# Patient Record
Sex: FEMALE | Race: WHITE | NOT HISPANIC OR LATINO | Employment: OTHER | URBAN - METROPOLITAN AREA
[De-identification: names, ages, dates, MRNs, and addresses within clinical notes are randomized per-mention and may not be internally consistent; named-entity substitution may affect disease eponyms.]

---

## 2017-03-01 ENCOUNTER — TRANSFERRED RECORDS (OUTPATIENT)
Dept: HEALTH INFORMATION MANAGEMENT | Facility: CLINIC | Age: 67
End: 2017-03-01

## 2017-04-25 ENCOUNTER — TRANSFERRED RECORDS (OUTPATIENT)
Dept: HEALTH INFORMATION MANAGEMENT | Facility: CLINIC | Age: 67
End: 2017-04-25

## 2017-05-11 ENCOUNTER — TRANSFERRED RECORDS (OUTPATIENT)
Dept: HEALTH INFORMATION MANAGEMENT | Facility: CLINIC | Age: 67
End: 2017-05-11

## 2017-05-17 ENCOUNTER — TRANSFERRED RECORDS (OUTPATIENT)
Dept: HEALTH INFORMATION MANAGEMENT | Facility: CLINIC | Age: 67
End: 2017-05-17

## 2017-06-12 ENCOUNTER — TRANSFERRED RECORDS (OUTPATIENT)
Dept: HEALTH INFORMATION MANAGEMENT | Facility: CLINIC | Age: 67
End: 2017-06-12

## 2017-11-20 ENCOUNTER — TRANSFERRED RECORDS (OUTPATIENT)
Dept: HEALTH INFORMATION MANAGEMENT | Facility: CLINIC | Age: 67
End: 2017-11-20

## 2017-12-07 DIAGNOSIS — J84.9 ILD (INTERSTITIAL LUNG DISEASE) (H): Primary | ICD-10-CM

## 2018-05-23 DIAGNOSIS — J84.9 ILD (INTERSTITIAL LUNG DISEASE) (H): Primary | ICD-10-CM

## 2018-05-31 ENCOUNTER — DOCUMENTATION ONLY (OUTPATIENT)
Dept: PULMONOLOGY | Facility: CLINIC | Age: 68
End: 2018-05-31

## 2018-05-31 DIAGNOSIS — J84.9 ILD (INTERSTITIAL LUNG DISEASE) (H): ICD-10-CM

## 2018-05-31 PROBLEM — M32.9 SYSTEMIC LUPUS ERYTHEMATOSUS (H): Status: ACTIVE | Noted: 2018-05-31

## 2018-05-31 LAB
6 MIN WALK (FT): 1480 FT
6 MIN WALK (M): 451 M

## 2018-05-31 NOTE — PROGRESS NOTES
PULMONARY STAFF    Pt referred by Dr. Ng and colleagues (with specific request for consultation by Dr. ANNALISE Valentin) for evaluation of potential needs for diagnostic lung Bx and/or treatment for abnormal chest CT (including lymphadenopathy and/or nodules plus mosaic pattern), mild restrictive PFT abnormalities and probable restrictive lung disease assoc with her longstanding SLE (manifestations - skin; cerebral arteritis, pericarditis s/p window (among others).  H/o lung reaction to nitrofurantoin and has many allergies.  SLE has been treated with many different agents over time.  When her pulmonary TOMLINSON worsened (spring 2017) she had negative Bronch and BAL (3/17), mild PFT restriction and negative cardiac w/u.  Her symptoms did not respond to high dose prednisone (60/day).  Apparently she had bronch with negative FNA of mediastinal node in 4/18 (although don't have formal pathology interpretation).  She also has h/o CHERISE by chart review.    She was scheduled for 2nd opinion consultation with Dr. Petra Valentin today and Dr. Valentin reviewed her outside recent chest CT that showed primarily mosaic pattern with little fibrotic or GG-type changes.  Unfortunately Dr. Valentin had to leave clinic this AM for medical reason and I am her backup.  With Liyah Cortes RN, I met with patient and we explained the situation.  She will not be charged for this visit and will return next week to see Dr. Valentin.  She asked that I review today's information with her.  I reviewed her PFTs from today and c/w 3/1/17 outside PFTs.  Today there are mild symmetric decreases in FEV1 and FVC, mild decrease in TLC and mild decrease in DLCO uncorrected for Hgb.  The DLCO is somewhat increased c/w prior PFTs and otherwise the PFTs were not changed.  6 minute walk test was done and the distance walked exceeded the predicted LLN (~1500 vs 1300 feet) and there was no desaturation on walking with RA (lowest O2 sat 95%)  .  P: Return consultative visit with Dr. ANNALISE Valentin  next week.    Lyle Burnett MD

## 2018-06-05 ENCOUNTER — OFFICE VISIT (OUTPATIENT)
Dept: PULMONOLOGY | Facility: CLINIC | Age: 68
End: 2018-06-05
Attending: INTERNAL MEDICINE
Payer: COMMERCIAL

## 2018-06-05 VITALS
OXYGEN SATURATION: 98 % | DIASTOLIC BLOOD PRESSURE: 80 MMHG | HEART RATE: 90 BPM | RESPIRATION RATE: 16 BRPM | SYSTOLIC BLOOD PRESSURE: 139 MMHG

## 2018-06-05 DIAGNOSIS — J21.9 BRONCHIOLITIS: Primary | ICD-10-CM

## 2018-06-05 RX ORDER — ACYCLOVIR 400 MG/1
TABLET ORAL
Status: ON HOLD | COMMUNITY
Start: 2018-03-28 | End: 2018-10-02

## 2018-06-05 RX ORDER — LEFLUNOMIDE 10 MG/1
10 TABLET ORAL EVERY MORNING
COMMUNITY
Start: 2018-04-12

## 2018-06-05 RX ORDER — ESTRADIOL 0.1 MG/G
1 CREAM VAGINAL WEEKLY
COMMUNITY
Start: 2017-09-28

## 2018-06-05 RX ORDER — MONTELUKAST SODIUM 10 MG/1
10 TABLET ORAL AT BEDTIME
Qty: 30 TABLET | Refills: 11 | Status: SHIPPED | OUTPATIENT
Start: 2018-06-05 | End: 2019-06-10

## 2018-06-05 RX ORDER — GARLIC EXTRACT 500 MG
CAPSULE ORAL
COMMUNITY
End: 2018-10-01

## 2018-06-05 RX ORDER — RABEPRAZOLE SODIUM 20 MG/1
20 TABLET, DELAYED RELEASE ORAL AT BEDTIME
COMMUNITY
Start: 2018-01-31 | End: 2023-06-22

## 2018-06-05 RX ORDER — ESTRADIOL 10 UG/1
INSERT VAGINAL
Status: ON HOLD | COMMUNITY
Start: 2017-09-28 | End: 2018-10-02

## 2018-06-05 RX ORDER — PREDNISONE 1 MG/1
TABLET ORAL
COMMUNITY
Start: 2016-11-10 | End: 2018-10-01

## 2018-06-05 RX ORDER — TRAMADOL HYDROCHLORIDE 50 MG/1
TABLET ORAL
Status: ON HOLD | COMMUNITY
Start: 2016-10-13 | End: 2018-10-02

## 2018-06-05 RX ORDER — QUINIDINE GLUCONATE 324 MG
TABLET, EXTENDED RELEASE ORAL
Status: ON HOLD | COMMUNITY
Start: 2017-04-25 | End: 2018-10-02

## 2018-06-05 RX ORDER — PREDNISONE 5 MG/1
5 TABLET ORAL
Status: ON HOLD | COMMUNITY
Start: 2017-12-28 | End: 2018-10-02

## 2018-06-05 RX ORDER — CLOTRIMAZOLE 1 %
1 CREAM WITH APPLICATOR VAGINAL
Status: ON HOLD | COMMUNITY
End: 2019-10-01

## 2018-06-05 RX ORDER — PIMECROLIMUS 10 MG/G
CREAM TOPICAL DAILY PRN
COMMUNITY
Start: 2017-01-31

## 2018-06-05 RX ORDER — LANOLIN ALCOHOL/MO/W.PET/CERES
3 CREAM (GRAM) TOPICAL
COMMUNITY
End: 2018-10-01

## 2018-06-05 ASSESSMENT — PAIN SCALES - GENERAL: PAINLEVEL: NO PAIN (0)

## 2018-06-05 NOTE — LETTER
"6/5/2018       RE: Saira Pyle  [address] [facility] [address]     Dear Colleague,    Thank you for referring your patient, Saira Pyle, to the [facility] at [facility]. Please see a copy of my visit note below.    Interstitial Lung Disease Clinic Initial Visit Note    CC: New evaluation for dyspnea on exertion related to lupus      HPI:   Mrs. Saira Pyle is a 68yo female with a history of lupus, complicated by pericarditis requiring a pericardial window in 2006 who presents for evaluation of ILD related to Lupus.      The patient reports that her lupus was first diagnosed around age 27.  Summary of her course per Dr. Dewey:     \"Saira Pyle is a 67 y.o. old female here for followup of long-standing systemic lupus erythematosus with history of inflammatory arthritis and pericarditis, status post pericardial window 1996.       -She was maintained for years on quinacrine, off it since early 2010 due to lack of availability.   -Prior to that, Plaquenil was discontinued because it caused skin color changes and concern about macular findings.   -She did not tolerate Imuran due to GI distress (tried twice).   -For a few years, low dose methotrexate (side effect fatigue) and prednisone for inflammatory arthritis, but she stopped methotrexate due to myalgias and lack of complete control of arthritis with 4 tabs once weekly.    -CellCept 250 mg twice a day was started in October 2013, gradually titrated up to 750 mg twice a day.  Stopped due to gastrointestinal distress and without good control of her inflammatory arthritis.  -Arava 10 mg daily (20 mg daily causes nausea and headache) started March 2017, doing well for control of her inflammatory arthritis (with prednisone 3 mg daily).      In November 2016 she underwent left hand surgery with Dr. Osorio for extensor tendon slippage with ulnar " "deviation.        February 2017 she noted dyspnea on exertion when walking.  Dobutamine stress echocardiogram was negative for ischemia.  Chest x-ray normal.  Pulmonary function tests showed restrictive physiology.  Echocardiogram normal; no pulmonary hypertension  High-resolution chest CT scan 4/7/2017 showing interstitial lung changes and groundglass appearance.  Bronchoalveolar lavage was negative for infection.        May 22, 2017, Dr. Ng started prednisone 60 mg ×1 week, 20 mg ×3 weeks, 10 mg ×1 week, 10 mg alternating with 5 mg for one week, then 5 mg daily. I had her slow the taper by 1 mg every 2 weeks, then remain on 5 mg QD.       Rituxan was scheduled in mid July, delayed for recurrent C. difficile colitis. She was treated with a prolonged course of oral vancomycin.      She received Rituxan November 3, 2017, stopped for infusion reaction (tightness and burning in throat, recurred the next day).\"      The patient first saw pulmonary medicine at [facility] in the Spring of 2017, with symptoms of dyspnea on exertion. Per Dr. Ng's notes, the patient was reporting dyspnea with 1/2 block of walking.  A CT chest during this time demonstrated minimal interstitial change, with significant air trapping.  She was empirically started on high dose prednisone in June 2017 and per report, had symptomatic improvement.  Kierra tells me she can't really remember feeling significant improvement.  She was tapered back down to her previous dose of 3mg prednisone daily over a month.  She was seen again in November 2017 and primary concern at that time was for bronchiolitis based on air trapping/mosaicism on her repeat imaging.   Multiple options were considered including lung biopsy, increasing immunosuppression, or watchful waiting.  Given some stability of symptoms and intolerance of several medications, watchful waiting has been pursued and she is here for another opinion.      Currently, the patient's primary " "symptom is dyspnea on exertion, but this has been stable and it does not limit any of her day to day activities.  She is able to work through her dyspnea without stopping.  She denies any other respiratory symptoms including cough, wheeze, sputum production.  She reports her lupus symptoms are largely stable and is tolerating her regimen (leflunomide and prednisone 3mg daily).      Please refer to social history for detailed exposure history    PMH:  SLE  - pericardial window in 2006  - pleurisy in her 20s x4 weeks  Multiple drug intolerances- see Dr. Dewey's summary above      Allergies:  Allergies   Allergen Reactions     Acetaminophen Anaphylaxis     Influenza Vaccines Anaphylaxis     Rituximab Hives and Itching     Other reaction(s): Breathing Difficulty     Cephalexin Hives     Amitriptyline      PN: LW Reaction: agitation, irritability     Azithromycin Other (See Comments)     \"systemic organ failure\"     Cephalosporins Hives     Cimetidine      PN: LW Reaction: GI Upset, vomiting     Codeine      PN: LW Reaction: Vomiting     Diazepam      hyperactive     Dronabinol Other (See Comments)     Other reaction(s): Headache  Nausea and vomiting     Fluoxetine Other (See Comments)     extreme agitation, cannot be combined for use with cipro     Metronidazole      Generalized illness     Metronidazole      Other reaction(s): Other - Describe In Comment Field  Extreme pain in legs     Miconazole      vaginal burning     Morphine Sulfate (Concentrate)      nausea and vomiting     Nitrofurantoin      Multiple organ failure     Nortriptyline Other (See Comments)     hyperactivity     Omeprazole      PN: LW Reaction: GI Upset     Thimerosal Other (See Comments)     Severe buring, lupus flare     Tioconazole Other (See Comments)     Vaginal burning and bleeding     Erythromycin Rash     malaria  type reaction - fever and hair fell out     Iodine Rash     Needs to have Betadine washed off aftr surgery     Latex Rash     " Other reaction(s): Other  redness     Levofloxacin Rash     PN: LW Reaction: Unknown Reaction     Quinolones Rash     happened with levaquin and Ciprofloxacin     Sulfa Drugs Rash     Tetracycline Rash       Social History:  Social History     Social History     Marital status:      Spouse name: N/A     Number of children: N/A     Years of education: N/A     Occupational History     Not on file.     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 USE SPARINGLY.       estradiol (YUVAFEM) 10 MCG TABS vaginal tablet INSERT 1 TABLET VAGINALLY 2 TO 3 TIMES WEEKLY AT BEDTIME       Glucosamine-Chondroit-Vit C-Mn (GLUCOSAMINE-CHONDROITIN) TABS daily.       leflunomide (ARAVA) 10 MG tablet Take 10 mg by mouth       pimecrolimus (ELIDEL) 1 % cream        predniSONE (DELTASONE) 1 MG tablet TAKE THREE TO FOUR TABLETS BY MOUTH ONCE DAILY       predniSONE (DELTASONE) 5 MG tablet Take 5 mg by mouth        RABEprazole (ACIPHEX) 20 MG EC tablet Take 20 mg by mouth       traMADol (ULTRAM) 50 MG tablet 1 tab qhs prn       Carboxymethylcell-Hypromellose (GENTEAL OP) Apply or instill  into both eyes daily at bedtime.       clotrimazole (LOTRIMIN) 1 % cream        hydroxypropyl methylcellulose (GENTEAL) 0.2 % SOLN ophthalmic solution 1 drop       Lactobacillus Acid-Pectin (ACIDOPHILUS/PECTIN) CAPS        MAGNESIUM PO        melatonin 3 MG tablet Take 3 mg by mouth       Red Yeast Rice 500 MG/0.5GM POWD          Family History:  Sister and Mother have Sjogrens  No other lung disease in her family    ROS: Complete 10 point ROS negative unless mentioned in HPI    Physical Exam:  /80 (BP Location: Right arm, Patient Position: Chair, Cuff Size: Adult Regular)  Pulse 90  Resp 16  SpO2 98%    General: Sitting in the chair in NAD  HEENT: anicteric, moist mucosa  Neck: no palpable lymphadenopathy, no JVD noted  Chest: CTAB, no wheezing or crackles  Cardiac: RRR no murmurs  Abdomen: Soft, flat, non tender, active BS  Extremities: No LE Edema, cyanosis, or clubbing.  Both hands with significant joint deformities.    Neuro: A&Ox3, no focal defecits  Skin: no rash noted        Labs and Radiology:  CT Chest personally reviewed and reviewed with patient.  Demonstrates mild scattered reticulonodular thickening and scattered ground glass opacity; more notable is significant mosaicism on expiratory cuts.        PFT's:      Assessment and Plan:  Saira Pyle is a 67 year old female with a history of SLE complicated by pericarditis s/p pericardial window in 2006, ongoing joint pain, and now dyspnea on exertion.  Her ILD changes on chest CT are actually quite minimal and could be scarring related to previous infection or other changes (vs SLE) vs shrinking lung syndrome - based on this and her PFTs which show mild restriction and normal diffusing capacity, do not suspect this is the primary cause of her dyspnea.  However,  with significant air trapping on expiratory cuts, it's likely this patient has bronchiolitis related to her SLE.  She has multiple medication intolerances including to azithromycin, so will begin empiric tx with an ICS-LABA and montelukast.  We'll have Ms. Bhaskar Pyle return in 3 months to re-assess her symptoms and to follow her PFTs.      Patient seen and discussed with Dr. Barbie Barlow MD  Pulmonary and Critical Care Fellow      I saw and evaluated patient with Fellow.  Case discussed - agree with note.  I reviewed PFT: mild restriction with normal diffusion.  I reviewed outside chest CT: mosaic attenuation with air trapping, minimal ILD.  I suspect that she has bronchiolitis (small airways disease), probably associated with her SLE.  Recs as above.  Would not add immunosuppression to her regimen.  I would like to treat with daily azithromycin as an anti-inflammatory for her bronchiolitis, but she had a bad reaction to it in the past (which to me sounds like septic shock due to underlying UTI).  Given her reluctance to try azithromycin, will use ICS/LABA and daily montelukast.    KESHAWN KELLY M.D.

## 2018-06-05 NOTE — PROGRESS NOTES
"Interstitial Lung Disease Clinic Initial Visit Note    CC: New evaluation for dyspnea on exertion related to lupus      HPI:   Mrs. Saira Pyle is a 68yo female with a history of lupus, complicated by pericarditis requiring a pericardial window in 2006 who presents for evaluation of ILD related to Lupus.      The patient reports that her lupus was first diagnosed around age 27.  Summary of her course per Dr. Dewey:     \"Saira Pyle is a 67 y.o. old female here for followup of long-standing systemic lupus erythematosus with history of inflammatory arthritis and pericarditis, status post pericardial window 1996.       -She was maintained for years on quinacrine, off it since early 2010 due to lack of availability.   -Prior to that, Plaquenil was discontinued because it caused skin color changes and concern about macular findings.   -She did not tolerate Imuran due to GI distress (tried twice).   -For a few years, low dose methotrexate (side effect fatigue) and prednisone for inflammatory arthritis, but she stopped methotrexate due to myalgias and lack of complete control of arthritis with 4 tabs once weekly.    -CellCept 250 mg twice a day was started in October 2013, gradually titrated up to 750 mg twice a day.  Stopped due to gastrointestinal distress and without good control of her inflammatory arthritis.  -Arava 10 mg daily (20 mg daily causes nausea and headache) started March 2017, doing well for control of her inflammatory arthritis (with prednisone 3 mg daily).      In November 2016 she underwent left hand surgery with Dr. Osorio for extensor tendon slippage with ulnar deviation.        February 2017 she noted dyspnea on exertion when walking.  Dobutamine stress echocardiogram was negative for ischemia.  Chest x-ray normal.  Pulmonary function tests showed restrictive physiology.  Echocardiogram normal; no pulmonary hypertension  High-resolution chest CT scan 4/7/2017 showing interstitial " "lung changes and groundglass appearance.  Bronchoalveolar lavage was negative for infection.        May 22, 2017, Dr. Ng started prednisone 60 mg ×1 week, 20 mg ×3 weeks, 10 mg ×1 week, 10 mg alternating with 5 mg for one week, then 5 mg daily. I had her slow the taper by 1 mg every 2 weeks, then remain on 5 mg QD.       Rituxan was scheduled in mid July, delayed for recurrent C. difficile colitis. She was treated with a prolonged course of oral vancomycin.      She received Rituxan November 3, 2017, stopped for infusion reaction (tightness and burning in throat, recurred the next day).\"      [facility] with symptoms of dyspnea on exertion. Per Dr. Ng's notes, the patient was reporting dyspnea with 1/2 block of walking.  A CT chest during this time demonstrated minimal interstitial change, with significant air trapping.  She was empirically started on high dose prednisone in June 2017 and per report, had symptomatic improvement.  Kierra tells me she can't really remember feeling significant improvement.  She was tapered back down to her previous dose of 3mg prednisone daily over a month.  She was seen again in November 2017 and primary concern at that time was for bronchiolitis based on air trapping/mosaicism on her repeat imaging.   Multiple options were considered including lung biopsy, increasing immunosuppression, or watchful waiting.  Given some stability of symptoms and intolerance of several medications, watchful waiting has been pursued and she is here for another opinion.      Currently, the patient's primary symptom is dyspnea on exertion, but this has been stable and it does not limit any of her day to day activities.  She is able to work through her dyspnea without stopping.  She denies any other respiratory symptoms including cough, wheeze, sputum production.  She reports her lupus symptoms are largely stable and is tolerating " "her regimen (leflunomide and prednisone 3mg daily).      Please refer to social history for detailed exposure history    PMH:  SLE  - pericardial window in 2006  - pleurisy in her 20s x4 weeks  Multiple drug intolerances- see Dr. Dewey's summary above      Allergies:  Allergies   Allergen Reactions     Acetaminophen Anaphylaxis     Influenza Vaccines Anaphylaxis     Rituximab Hives and Itching     Other reaction(s): Breathing Difficulty     Cephalexin Hives     Amitriptyline      PN: LW Reaction: agitation, irritability     Azithromycin Other (See Comments)     \"systemic organ failure\"     Cephalosporins Hives     Cimetidine      PN: LW Reaction: GI Upset, vomiting     Codeine      PN: LW Reaction: Vomiting     Diazepam      hyperactive     Dronabinol Other (See Comments)     Other reaction(s): Headache  Nausea and vomiting     Fluoxetine Other (See Comments)     extreme agitation, cannot be combined for use with cipro     Metronidazole      Generalized illness     Metronidazole      Other reaction(s): Other - Describe In Comment Field  Extreme pain in legs     Miconazole      vaginal burning     Morphine Sulfate (Concentrate)      nausea and vomiting     Nitrofurantoin      Multiple organ failure     Nortriptyline Other (See Comments)     hyperactivity     Omeprazole      PN: LW Reaction: GI Upset     Thimerosal Other (See Comments)     Severe buring, lupus flare     Tioconazole Other (See Comments)     Vaginal burning and bleeding     Erythromycin Rash     malaria  type reaction - fever and hair fell out     Iodine Rash     Needs to have Betadine washed off aftr surgery     Latex Rash     Other reaction(s): Other  redness     Levofloxacin Rash     PN: LW Reaction: Unknown Reaction     Quinolones Rash     happened with levaquin and Ciprofloxacin     Sulfa Drugs Rash     Tetracycline Rash       Social History:  Social History     Social History     Marital status:      Spouse name: N/A     Number of " children: N/A     Years of education: N/A     Occupational History     Not on file.     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 USE SPARINGLY.       estradiol (YUVAFEM) 10 MCG TABS vaginal tablet INSERT 1 TABLET VAGINALLY 2 TO 3 TIMES WEEKLY AT BEDTIME       Glucosamine-Chondroit-Vit C-Mn (GLUCOSAMINE-CHONDROITIN) TABS daily.       leflunomide (ARAVA) 10 MG tablet Take 10 mg by mouth       pimecrolimus (ELIDEL) 1 % cream        predniSONE (DELTASONE) 1 MG tablet TAKE THREE TO FOUR TABLETS BY MOUTH ONCE DAILY       predniSONE (DELTASONE) 5 MG tablet Take 5 mg by mouth       RABEprazole (ACIPHEX) 20 MG EC tablet Take 20 mg by mouth       traMADol (ULTRAM) 50 MG tablet 1 tab qhs prn       Carboxymethylcell-Hypromellose (GENTEAL OP) Apply or instill  into both eyes daily at bedtime.       clotrimazole (LOTRIMIN) 1 % cream        hydroxypropyl methylcellulose (GENTEAL) 0.2 % SOLN ophthalmic solution 1  drop       Lactobacillus Acid-Pectin (ACIDOPHILUS/PECTIN) CAPS        MAGNESIUM PO        melatonin 3 MG tablet Take 3 mg by mouth       Red Yeast Rice 500 MG/0.5GM POWD          Family History:  Sister and Mother have Sjogrens  No other lung disease in her family    ROS: Complete 10 point ROS negative unless mentioned in HPI    Physical Exam:  /80 (BP Location: Right arm, Patient Position: Chair, Cuff Size: Adult Regular)  Pulse 90  Resp 16  SpO2 98%    General: Sitting in the chair in NAD  HEENT: anicteric, moist mucosa  Neck: no palpable lymphadenopathy, no JVD noted  Chest: CTAB, no wheezing or crackles  Cardiac: RRR no murmurs  Abdomen: Soft, flat, non tender, active BS  Extremities: No LE Edema, cyanosis, or clubbing.  Both hands with significant joint deformities.    Neuro: A&Ox3, no focal defecits  Skin: no rash noted        Labs and Radiology:  CT Chest personally reviewed and reviewed with patient.  Demonstrates mild scattered reticulonodular thickening and scattered ground glass opacity; more notable is significant mosaicism on expiratory cuts.        PFT's:      Assessment and Plan:  Saira Pyle is a 67 year old female with a history of SLE complicated by pericarditis s/p pericardial window in 2006, ongoing joint pain, and now dyspnea on exertion.  Her ILD changes on chest CT are actually quite minimal and could be scarring related to previous infection or other changes (vs SLE) vs shrinking lung syndrome - based on this and her PFTs which show mild restriction and normal diffusing capacity, do not suspect this is the primary cause of her dyspnea.  However, with significant air trapping on expiratory cuts, it's likely this patient has bronchiolitis related to her SLE.  She has multiple medication intolerances including to azithromycin, so will begin empiric tx with an ICS-LABA and montelukast.  We'll have Ms. Bhaskar Pyle return in 3 months to re-assess her symptoms and to follow her  PFTs.      Patient seen and discussed with Dr. Barbie Barlow MD  Pulmonary and Critical Care Fellow      I saw and evaluated patient with Fellow.  Case discussed - agree with note.  I reviewed PFT: mild restriction with normal diffusion.  I reviewed outside chest CT: mosaic attenuation with air trapping, minimal ILD.  I suspect that she has bronchiolitis (small airways disease), probably associated with her SLE.  Recs as above.  Would not add immunosuppression to her regimen.  I would like to treat with daily azithromycin as an anti-inflammatory for her bronchiolitis, but she had a bad reaction to it in the past (which to me sounds like septic shock due to underlying UTI).  Given her reluctance to try azithromycin, will use ICS/LABA and daily montelukast.    KESHAWN KELLY M.D.      Addendum:  I reviewed chest CT scnas in ILD conference with Dr. Crespo.  There are basilar reticular changes, some ground glass opacities, bilateral mosaic; possibilites include HP or NSIP with small airways disease.

## 2018-06-05 NOTE — MR AVS SNAPSHOT
After Visit Summary   6/5/2018    Saira Pyle    MRN: [id number]           Patient Information     Date Of Birth          1950        Visit Information        Provider Department      6/5/2018 10:00 AM Petra Valentin MD [facility]        Today's Diagnoses     Bronchiolitis    -  1       Follow-ups after your visit        Follow-up notes from your care team     Return in about 3 months (around 9/5/2018).      Your next 10 appointments already scheduled     Sep 07, 2018 10:30 AM CDT   FULL PULMONARY FUNCTION with  PFL C   [facility] Pulmonary Function Testing ([facility])    [address]  3rd Floor  [facility] [address] [phone]            Sep 07, 2018 11:30 AM CDT   (Arrive by 11:15 AM)   Return Interstitial Lung with Petra Valentin MD   [facility] ([facility])    [address]  [address] [place] [id number] [phone]              Future tests that were ordered for you today     Open Future Orders        Priority Expected Expires Ordered    General PFT Lab (Please always keep checked) Routine  6/5/2019 6/5/2018    Pulmonary Function Test Routine  6/5/2019 6/5/2018            Who to contact     If you have questions or need follow up information about today's clinic visit or your schedule please contact [facility] L[facility] SCIENCE [facility] directly at [phone].  Normal or non-critical lab and imaging results will be communicated to you by MyChart, letter or phone within 4 business days after the clinic has received the results. If you do not hear from us within 7 days, please contact the clinic through MyChart or phone. If you have a critical or abnormal lab result, we will notify you by phone as soon as possible.  Submit refill requests through JumpCloud or call your pharmacy and they will forward the refill request to us.  "Please allow 3 business days for your refill to be completed.          Additional Information About Your Visit        MyChart Information     Planwisehart lets you send messages to your doctor, view your test results, renew your prescriptions, schedule appointments and more. To sign up, go to [email or web address] . Click on \"Log in\" on the left side of the screen, which will take you to the Welcome page. Then click on \"Sign up Now\" on the right side of the page.     You will be asked to enter the access code listed below, as well as some personal information. Please follow the directions to create your username and password.     Your access code is: [email or web address]  Expires: 2018  9:20 AM     Your access code will  in 90 days. If you need help or a new code, please call your [facility] clinic or [phone].        Care EveryWhere ID     This is your Care EveryWhere ID. This could be used by other organizations to access your [facility] medical records  [id number]        Your Vitals Were     Pulse Respirations Pulse Oximetry             90 16 98%          Blood Pressure from Last 3 Encounters:   18 139/80    Weight from Last 3 Encounters:   No data found for Wt                 Today's Medication Changes          These changes are accurate as of 18 11:19 AM.  If you have any questions, ask your nurse or doctor.               Start taking these medicines.        Dose/Directions    fluticasone-vilanterol 100-25 MCG/INH oral inhaler   Commonly known as:  BREO ELLIPTA   Used for:  Bronchiolitis   Started by:  Petra Valentin MD        Dose:  1 puff   Inhale 1 puff into the lungs daily   Quantity:  3 Inhaler   Refills:  3       montelukast 10 MG tablet   Commonly known as:  SINGULAIR   Used for:  Bronchiolitis   Started by:  Petra Valentin MD        Dose:  10 mg   Take 1 tablet (10 mg) by mouth At Bedtime   Quantity:  30 tablet   Refills:  11            Where to get your medicines      These medications were " sent to [facility] [address] [address]  [address] [facility] [facility] [address], [facility] [address]     Phone:  [phone]     fluticasone-vilanterol 100-25 MCG/INH oral inhaler    montelukast 10 MG tablet               Information about OPIOIDS     PRESCRIPTION OPIOIDS: WHAT YOU NEED TO KNOW   You have a prescription for an opioid (narcotic) pain medicine. Opioids can cause addiction. If you have a history of chemical dependency of any type, you are at a higher risk of becoming addicted to opioids. Only take this medicine after all other options have been tried. Take it for as short a time and as few doses as possible.     Do not:    Drive. If you drive while taking these medicines, you could be arrested for driving under the influence (DUI).    Operate heavy machinery    Do any other dangerous activities while taking these medicines.     Drink any alcohol while taking these medicines.      Take with any other medicines that contain acetaminophen. Read all labels carefully. Look for the word  acetaminophen  or  Tylenol.  Ask your pharmacist if you have questions or are unsure.    Store your pills in a secure place, locked if possible. We will not replace any lost or stolen medicine. If you don t finish your medicine, please throw away (dispose) as directed by your pharmacist. The [place] Pollution Control Agency has more information about safe disposal: https://www.pca.[facility].[place].us/living-green/managing-unwanted-medications    All opioids tend to cause constipation. Drink plenty of water and eat foods that have a lot of fiber, such as fruits, vegetables, prune juice, apple juice and high-fiber cereal. Take a laxative (Miralax, milk of magnesia, Colace, Senna) if you don t move your bowels at least every other day.          Primary Care Provider Office Phone # Fax #    Kamar Olivares [phone]       [place] [address]  [place] [address]        Equal Access to Services     [facility]: Hadii prema rodriguez sana Perez, wabonifacioda luqfrancheskaha, tato kanickycarl villarealledycarl, natasha mendosaarunjannie garcia. So [facility] [phone].    ATENCIÓN: Si habla español, tiene a zhang disposición servicios gratuitos de asistencia lingüística. Rich al [phone].    We comply with applicable federal civil rights laws and [place] laws. We do not discriminate on the basis of race, color, national origin, age, disability, sex, sexual orientation, or gender identity.            Thank you!     Thank you for choosing [facility]  for your care. Our goal is always to provide you with excellent care. Hearing back from our patients is one way we can continue to improve our services. Please take a few minutes to complete the written survey that you may receive in the mail after your visit with us. Thank you!             Your Updated Medication List - Protect others around you: Learn how to safely use, store and throw away your medicines at www.disposemymeds.org.          This list is accurate as of 6/5/18 11:19 AM.  Always use your most recent med list.                   Brand Name Dispense Instructions for use Diagnosis    acidophilus/pectin Caps           ACIPHEX 20 MG EC tablet   Generic drug:  RABEprazole      Take 20 mg by mouth        acyclovir 400 MG tablet    ZOVIRAX     Take 400 mg by mouth        calcium carbonate-vitamin D 600-200 MG-UNIT Tabs           clotrimazole 1 % cream    LOTRIMIN          * ESTRACE VAGINAL 0.1 MG/GM cream   Generic drug:  estradiol      APPLY 1 GRAM VAGINALLY 1 TO 3 TIMES WEEKLY. USE SPARINGLY.        * YUVAFEM 10 MCG Tabs vaginal tablet   Generic drug:  estradiol      INSERT 1 TABLET VAGINALLY 2 TO 3 TIMES WEEKLY AT BEDTIME        fluticasone-vilanterol 100-25 MCG/INH oral inhaler    BREO ELLIPTA    3 Inhaler    Inhale 1 puff into the lungs daily    Bronchiolitis       GENTEAL OP       Apply or instill  into both eyes daily at bedtime.        Glucosamine-Chondroitin Tabs      daily.        hydroxypropyl methylcellulose 0.2 % Soln ophthalmic solution    GENTEAL     1 drop        leflunomide 10 MG tablet    ARAVA     Take 10 mg by mouth        MAGNESIUM PO           melatonin 3 MG tablet      Take 3 mg by mouth        montelukast 10 MG tablet    SINGULAIR    30 tablet    Take 1 tablet (10 mg) by mouth At Bedtime    Bronchiolitis       pimecrolimus 1 % cream    ELIDEL          * predniSONE 1 MG tablet    DELTASONE     TAKE THREE TO FOUR TABLETS BY MOUTH ONCE DAILY        * predniSONE 5 MG tablet    DELTASONE     Take 5 mg by mouth        Red Yeast Rice 500 MG/0.5GM Powd           traMADol 50 MG tablet    ULTRAM     1 tab qhs prn        * Notice:  This list has 4 medication(s) that are the same as other medications prescribed for you. Read the directions carefully, and ask your doctor or other care provider to review them with you.

## 2018-06-25 LAB
DLCOUNC-%PRED-PRE: 74 %
DLCOUNC-PRE: 14.76 ML/MIN/MMHG
DLCOUNC-PRED: 19.76 ML/MIN/MMHG
ERV-%PRED-PRE: 78 %
ERV-PRE: 0.62 L
ERV-PRED: 0.79 L
EXPTIME-PRE: 6.31 SEC
FEF2575-%PRED-PRE: 71 %
FEF2575-PRE: 1.36 L/SEC
FEF2575-PRED: 1.9 L/SEC
FEFMAX-%PRED-PRE: 121 %
FEFMAX-PRE: 6.7 L/SEC
FEFMAX-PRED: 5.49 L/SEC
FEV1-%PRED-PRE: 74 %
FEV1-PRE: 1.56 L
FEV1FEV6-PRE: 79 %
FEV1FEV6-PRED: 80 %
FEV1FVC-PRE: 79 %
FEV1FVC-PRED: 77 %
FEV1SVC-PRE: 78 %
FEV1SVC-PRED: 76 %
FIFMAX-PRE: 2.32 L/SEC
FRCPLETH-%PRED-PRE: 72 %
FRCPLETH-PRE: 1.84 L
FRCPLETH-PRED: 2.54 L
FVC-%PRED-PRE: 73 %
FVC-PRE: 1.97 L
FVC-PRED: 2.68 L
IC-%PRED-PRE: 70 %
IC-PRE: 1.4 L
IC-PRED: 1.98 L
RVPLETH-%PRED-PRE: 65 %
RVPLETH-PRE: 1.22 L
RVPLETH-PRED: 1.86 L
TLCPLETH-%PRED-PRE: 72 %
TLCPLETH-PRE: 3.24 L
TLCPLETH-PRED: 4.44 L
VA-%PRED-PRE: 69 %
VA-PRE: 3.19 L
VC-%PRED-PRE: 72 %
VC-PRE: 2.02 L
VC-PRED: 2.77 L

## 2018-07-17 ENCOUNTER — CARE COORDINATION (OUTPATIENT)
Dept: PULMONOLOGY | Facility: CLINIC | Age: 68
End: 2018-07-17

## 2018-07-17 NOTE — PROGRESS NOTES
Telephone Encounter: Incoming    Reason for Call: Question for upcoming surgery, wondering if any of her medication are anti-inflammatory and should be stop prior to surgery.     Nursing Action/Patient Instruction: Reviewed medications with Dr Valentin, no neither are and should both be continued before and after surgery. Informed patient.     Patient Response/Questions/Concerns: Agreed with plan.

## 2018-08-29 ENCOUNTER — TRANSFERRED RECORDS (OUTPATIENT)
Dept: HEALTH INFORMATION MANAGEMENT | Facility: CLINIC | Age: 68
End: 2018-08-29

## 2018-08-29 ENCOUNTER — PATIENT OUTREACH (OUTPATIENT)
Dept: CARE COORDINATION | Facility: CLINIC | Age: 68
End: 2018-08-29

## 2018-08-30 ENCOUNTER — OFFICE VISIT (OUTPATIENT)
Dept: PULMONOLOGY | Facility: CLINIC | Age: 68
End: 2018-08-30
Attending: INTERNAL MEDICINE
Payer: COMMERCIAL

## 2018-08-30 VITALS
WEIGHT: 115.7 LBS | SYSTOLIC BLOOD PRESSURE: 126 MMHG | DIASTOLIC BLOOD PRESSURE: 76 MMHG | OXYGEN SATURATION: 97 % | HEART RATE: 81 BPM | RESPIRATION RATE: 16 BRPM

## 2018-08-30 DIAGNOSIS — J21.9 BRONCHIOLITIS: ICD-10-CM

## 2018-08-30 DIAGNOSIS — J84.9 ILD (INTERSTITIAL LUNG DISEASE) (H): Primary | ICD-10-CM

## 2018-08-30 PROCEDURE — G0463 HOSPITAL OUTPT CLINIC VISIT: HCPCS | Mod: ZF

## 2018-08-30 RX ORDER — ROSUVASTATIN CALCIUM 5 MG/1
5 TABLET, COATED ORAL SEE ADMIN INSTRUCTIONS
COMMUNITY

## 2018-08-30 ASSESSMENT — ENCOUNTER SYMPTOMS
NECK PAIN: 1
NERVOUS/ANXIOUS: 0
TINGLING: 1
TREMORS: 0
BACK PAIN: 1
NIGHT SWEATS: 1
CONSTIPATION: 0
ALTERED TEMPERATURE REGULATION: 1
HEARTBURN: 1
EYE WATERING: 0
SPUTUM PRODUCTION: 0
POLYPHAGIA: 0
EYE IRRITATION: 0
DISTURBANCES IN COORDINATION: 0
SYNCOPE: 0
BLOOD IN STOOL: 0
WEIGHT GAIN: 0
PANIC: 0
DOUBLE VISION: 0
SWOLLEN GLANDS: 0
DECREASED CONCENTRATION: 0
EXERCISE INTOLERANCE: 0
SINUS CONGESTION: 0
SORE THROAT: 0
VOMITING: 0
JAUNDICE: 0
NECK MASS: 0
DYSPNEA ON EXERTION: 0
MUSCLE WEAKNESS: 0
SLEEP DISTURBANCES DUE TO BREATHING: 0
TROUBLE SWALLOWING: 1
NUMBNESS: 0
RECTAL PAIN: 0
WEIGHT LOSS: 1
HEMATURIA: 0
HEADACHES: 0
HYPOTENSION: 0
STIFFNESS: 1
SPEECH CHANGE: 0
WHEEZING: 0
ARTHRALGIAS: 1
HOT FLASHES: 1
BRUISES/BLEEDS EASILY: 1
DECREASED APPETITE: 0
LOSS OF CONSCIOUSNESS: 0
LIGHT-HEADEDNESS: 0
DIFFICULTY URINATING: 0
COUGH DISTURBING SLEEP: 1
DYSURIA: 0
CHILLS: 0
FATIGUE: 1
HALLUCINATIONS: 0
EYE REDNESS: 0
POOR WOUND HEALING: 0
DEPRESSION: 0
JOINT SWELLING: 1
DIZZINESS: 1
SMELL DISTURBANCE: 0
SINUS PAIN: 0
WEAKNESS: 0
BLOATING: 0
HOARSE VOICE: 0
DECREASED LIBIDO: 0
NAUSEA: 1
PARALYSIS: 0
SHORTNESS OF BREATH: 0
HEMOPTYSIS: 0
POLYDIPSIA: 0
MEMORY LOSS: 0
NAIL CHANGES: 0
DIARRHEA: 1
BOWEL INCONTINENCE: 0
FLANK PAIN: 0
PALPITATIONS: 0
POSTURAL DYSPNEA: 0
TASTE DISTURBANCE: 0
ABDOMINAL PAIN: 0
LEG PAIN: 0
MYALGIAS: 1
EYE PAIN: 0
INSOMNIA: 1
INCREASED ENERGY: 0
SNORES LOUDLY: 1
SEIZURES: 0
FEVER: 0
MUSCLE CRAMPS: 1
ORTHOPNEA: 0
SKIN CHANGES: 0
COUGH: 0
HYPERTENSION: 0

## 2018-08-30 ASSESSMENT — PAIN SCALES - GENERAL: PAINLEVEL: NO PAIN (0)

## 2018-08-30 NOTE — LETTER
8/30/2018      RE: Saira Pyle  [address] [facility] [address]       [facility]    Reason for Visit  Saira Pyle is a 68 year old year old female who is being seen for RECHECK (follow up of ILD/HX )    HPI    Ms. 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  At her last visit which was her initial visit, I started Breo Ellipta (fluticasone Vilanterol) and montelukast for her bronchiolitis.  I did not start daily azithromycin due to a past severe reaction to it (which sounds actually like sepsis from a UTI she had at the time).  Today, she reports that her breathing is improved.  She reports no side effects from either medication.  She reports that she can walk uphill with less shortness of breath than 3 months ago.  She does sometimes still feel a little short of breath when she walks up a flight of stairs if she is tired.        Current Outpatient Prescriptions   Medication     acyclovir (ZOVIRAX) 400 MG tablet     calcium carbonate-vitamin D 600-200 MG-UNIT TABS     Carboxymethylcell-Hypromellose (GENTEAL OP)     clotrimazole (LOTRIMIN) 1 % cream     estradiol (ESTRACE VAGINAL) 0.1 MG/GM cream     estradiol (YUVAFEM) 10 MCG TABS vaginal tablet     fluticasone-vilanterol (BREO ELLIPTA) 100-25 MCG/INH oral inhaler     Glucosamine-Chondroit-Vit C-Mn (GLUCOSAMINE-CHONDROITIN) TABS     hydroxypropyl methylcellulose (GENTEAL) 0.2 % SOLN ophthalmic solution     Lactobacillus Acid-Pectin (ACIDOPHILUS/PECTIN) CAPS     leflunomide (ARAVA) 10 MG tablet     MAGNESIUM PO     melatonin 3 MG tablet     montelukast (SINGULAIR) 10 MG tablet     pimecrolimus (ELIDEL) 1 % cream     predniSONE (DELTASONE) 1 MG tablet     predniSONE (DELTASONE) 5 MG tablet     RABEprazole (ACIPHEX) 20 MG EC tablet     Red Yeast Rice 500 MG/0.5GM POWD     rosuvastatin (CRESTOR) 5 MG tablet     traMADol (ULTRAM)  "50 MG tablet     No current facility-administered medications for this visit.      Allergies   Allergen Reactions     Acetaminophen Anaphylaxis     Influenza Vaccines Anaphylaxis     Rituximab Hives and Itching     Other reaction(s): Breathing Difficulty     Cephalexin Hives     Amitriptyline      PN: LW Reaction: agitation, irritability     Azithromycin Other (See Comments)     \"systemic organ failure\"     Cephalosporins Hives     Cimetidine      PN: LW Reaction: GI Upset, vomiting     Codeine      PN: LW Reaction: Vomiting     Diazepam      hyperactive     Dronabinol Other (See Comments)     Other reaction(s): Headache  Nausea and vomiting     Fluoxetine Other (See Comments)     extreme agitation, cannot be combined for use with cipro     Metronidazole      Generalized illness     Metronidazole      Other reaction(s): Other - Describe In Comment Field  Extreme pain in legs     Miconazole      vaginal burning     Morphine Sulfate (Concentrate)      nausea and vomiting     Nitrofurantoin      Multiple organ failure     Nortriptyline Other (See Comments)     hyperactivity     Omeprazole      PN: LW Reaction: GI Upset     Thimerosal Other (See Comments)     Severe buring, lupus flare     Tioconazole Other (See Comments)     Vaginal burning and bleeding     Erythromycin Rash     malaria  type reaction - fever and hair fell out     Iodine Rash     Needs to have Betadine washed off aftr surgery     Latex Rash     Other reaction(s): Other  redness     Levofloxacin Rash     PN: LW Reaction: Unknown Reaction     Quinolones Rash     happened with levaquin and Ciprofloxacin     Sulfa Drugs Rash     Tetracycline Rash     Past Medical History:   Diagnosis Date     Bronchiolitis     small airways disease probably related to SLE     GERD (gastroesophageal reflux disease)      ILD (interstitial lung disease) (H)     Mild ILD on chest CT, probably related to SLE     Pericarditis     due to SLE, s/p pericardial window 2006     SLE " (systemic lupus erythematosus related syndrome) (H)     dx in 20s; arthritis, serositis (pericarditis and pleuritis)       No past surgical history on file.    Social History     Social History     Marital status:      Spouse name: N/A     Number of children: N/A     Years of education: N/A     Occupational History     Not on file.     Social History Main Topics     Smoking status: Never Smoker     Smokeless tobacco: Never Used     Alcohol use Not on file     Drug use: Not on file     Sexual activity: Not on file     Other Topics Concern     Not on file     Social History Narrative    .  Had exposure to second hand tobacco smoke as a child.    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    Vitals: /76 (BP Location: Right arm, Patient Position: Chair, Cuff Size: Adult Regular)  Pulse 81  Resp 16  Wt 52.5 kg (115 lb 11.2 oz)  SpO2 97%    Exam:   GENERAL APPEARANCE: Well developed, well nourished, alert, and in no apparent distress.  RESP: good air flow throughout.  Bibasilar inspiratory crackles. No rhonchi. No wheezes.  CV: Normal S1, S2, regular rhythm, normal rate. No murmur.  No LE edema.   MS: extremities normal. No clubbing. No cyanosis.  SKIN: no rash on limited exam.  NEURO: Mentation intact, speech normal, normal gait and stance.  PSYCH: mentation appears normal. and affect  normal/bright.    Results:  Recent Results (from the past 168 hour(s))   General PFT Lab (Please always keep checked)    Collection Time: 08/30/18  9:02 AM   Result Value Ref Range    FVC-Pred 2.67 L    FVC-Pre 2.20 L    FVC-%Pred-Pre 82 %    FEV1-Pre 1.69 L    FEV1-%Pred-Pre 80 %    FEV1FVC-Pred 77 %    FEV1FVC-Pre 77 %    FEFMax-Pred 5.48 L/sec    FEFMax-Pre 6.57 L/sec    FEFMax-%Pred-Pre 119 %    FEF2575-Pred 1.89 L/sec    FEF2575-Pre 1.36 L/sec    RRW0017-%Pred-Pre 71 %    ExpTime-Pre 7.74 sec    FIFMax-Pre 5.23 L/sec    VC-Pred 2.76 L    VC-Pre 2.18 L    VC-%Pred-Pre 79 %    IC-Pred 1.94 L    IC-Pre 1.67 L    IC-%Pred-Pre 86 %    ERV-Pred 0.82 L    ERV-Pre 0.51 L    ERV-%Pred-Pre 61 %    FEV1FEV6-Pred 80 %    FEV1FEV6-Pre 77 %    FRCPleth-Pred 2.54 L    FRCPleth-Pre 1.98 L    FRCPleth-%Pred-Pre 77 %    RVPleth-Pred 1.86 L    RVPleth-Pre 1.47 L    RVPleth-%Pred-Pre 79 %    TLCPleth-Pred 4.44 L    TLCPleth-Pre 3.65 L    TLCPleth-%Pred-Pre 82 %    DLCOunc-Pred 19.73 ml/min/mmHg    DLCOunc-Pre 14.90 ml/min/mmHg    DLCOunc-%Pred-Pre 75 %    VA-Pre 3.06 L    VA-%Pred-Pre 66 %    FEV1SVC-Pred 76 %    FEV1SVC-Pre 78 %       I reviewed pulmonary function test that was performed today.  This shows normal spirometry and lung volumes and diffusion.  Lung function is improved compared to 3 months ago.    I reviewed results with the patient.      Assessment and plan:   Ms. Bhaskar Pyle is a 68-year-old who is here to follow-up mild ILD and bronchiolitis due to lupus.  1.  Bronchiolitis.  Her dyspnea on exertion is improved compared to 3 months ago.  She has had a good response to Breo Ellipta and montelukast.  We will continue both medications.  She will return in 6 months with full PFT.  I will not add daily azithromycin due to past severe reaction associated with it.  I have encouraged her to stay active.  I would not treat her interstitial lung disease at this time as it is mild, and her PFT is now normal.                   Petra Valentin MD

## 2018-08-30 NOTE — MR AVS SNAPSHOT
After Visit Summary   8/30/2018    Saira Pyle    MRN: [id number]           Patient Information     Date Of Birth          1950        Visit Information        Provider Department      8/30/2018 9:30 AM Petra Valentin MD [facility] Health        Today's Diagnoses     ILD (interstitial lung disease) (H)    -  1    Bronchiolitis           Follow-ups after your visit        Follow-up notes from your care team     Return in about 6 months (around 2/28/2019).      Your next 10 appointments already scheduled     Oct 02, 2018   Procedure with Kelsie Hardin MD   [facility] (--)    [address]  [facility] [address]   [phone]            Mar 07, 2019 10:00 AM CST   FULL PULMONARY FUNCTION with  PFL A   [facility] Pulmonary Function Testing ([facility])    [address]  3rd Floor  [facility] [id number] [phone]            Mar 07, 2019 11:00 AM CST   (Arrive by 10:45 AM)   Return Interstitial Lung with Petra Valentin MD   [facility] ([facility])    [address]  [address]  [facility] [id number] [phone]              Future tests that were ordered for you today     Open Future Orders        Priority Expected Expires Ordered    General PFT Lab (Please always keep checked) Routine 2/28/2019 8/30/2019 8/30/2018    Pulmonary Function Test Routine 2/28/2019 8/30/2019 8/30/2018            Who to contact     If you have questions or need follow up information about today's clinic visit or your schedule please contact [facility]ENCE [facility] HE[facility] directly at [phone].  Normal or non-critical lab and imaging results will be communicated to you by MyChart, letter or phone within 4 business days after the clinic has received the results. If you do not hear from us within 7 days, please contact the  "clinic through GlobalServehart or phone. If you have a critical or abnormal lab result, we will notify you by phone as soon as possible.  Submit refill requests through My Pick Box or call your pharmacy and they will forward the refill request to us. Please allow 3 business days for your refill to be completed.          Additional Information About Your Visit        [facility] [employer] Information     My Pick Box lets you send messages to your doctor, view your test results, renew your prescriptions, schedule appointments and more. To sign up, go to [email or web address] . Click on \"Log in\" on the left side of the screen, which will take you to the Welcome page. Then click on \"Sign up Now\" on the right side of the page.     You will be asked to enter the access code listed below, as well as some personal information. Please follow the directions to create your username and password.     Your access code is: [email or web address]  Expires: 2018  1:03 PM     Your access code will  in 90 days. If you need help or a new code, please call your [facility] clinic or [phone].        Care EveryWhere ID     This is your Care EveryWhere ID. This could be used by other organizations to access your [facility] medical records  [id number]        Your Vitals Were     Pulse Respirations Pulse Oximetry             81 16 97%          Blood Pressure from Last 3 Encounters:   18 126/76   18 139/80    Weight from Last 3 Encounters:   18 52.5 kg (115 lb 11.2 oz)               Primary Care Provider Office Phone # Fax #    [place] Thanh Olivares [phone]       [facility]  [address]  [place] [address]        Equal Access to Services     [facility]: Hadii prema hood Sonano, waaxda luqadaha, qaybta kaalmada adeegyada, natasha garcia. So [facility] [phone].    ATENCIÓN: Si habla español, tiene a zhang disposición servicios gratuitos de asistencia lingüística. Llame " al [phone].    We comply with applicable federal civil rights laws and [place] laws. We do not discriminate on the basis of race, color, national origin, age, disability, sex, sexual orientation, or gender identity.            Thank you!     Thank you for choosing [facility]  for your care. Our goal is always to provide you with excellent care. Hearing back from our patients is one way we can continue to improve our services. Please take a few minutes to complete the written survey that you may receive in the mail after your visit with us. Thank you!             Your Updated Medication List - Protect others around you: Learn how to safely use, store and throw away your medicines at www.disposemymeds.org.          This list is accurate as of 8/30/18  1:03 PM.  Always use your most recent med list.                   Brand Name Dispense Instructions for use Diagnosis    acidophilus/pectin Caps           ACIPHEX 20 MG EC tablet   Generic drug:  RABEprazole      Take 20 mg by mouth        acyclovir 400 MG tablet    ZOVIRAX     Take 400 mg by mouth        calcium carbonate-vitamin D 600-200 MG-UNIT Tabs           clotrimazole 1 % cream    LOTRIMIN          * ESTRACE VAGINAL 0.1 MG/GM cream   Generic drug:  estradiol      APPLY 1 GRAM VAGINALLY 1 TO 3 TIMES WEEKLY. USE SPARINGLY.        * YUVAFEM 10 MCG Tabs vaginal tablet   Generic drug:  estradiol      INSERT 1 TABLET VAGINALLY 2 TO 3 TIMES WEEKLY AT BEDTIME        fluticasone-vilanterol 100-25 MCG/INH inhaler    BREO ELLIPTA    3 Inhaler    Inhale 1 puff into the lungs daily    Bronchiolitis       GENTEAL OP      Apply or instill  into both eyes daily at bedtime.        Glucosamine-Chondroitin Tabs      daily.        hydroxypropyl methylcellulose 0.2 % Soln ophthalmic solution    GENTEAL     1 drop        leflunomide 10 MG tablet    ARAVA     Take 10 mg by mouth        MAGNESIUM PO           melatonin 3 MG tablet      Take 3 mg by  mouth        montelukast 10 MG tablet    SINGULAIR    30 tablet    Take 1 tablet (10 mg) by mouth At Bedtime    Bronchiolitis       pimecrolimus 1 % cream    ELIDEL          * predniSONE 1 MG tablet    DELTASONE     TAKE THREE TO FOUR TABLETS BY MOUTH ONCE DAILY        * predniSONE 5 MG tablet    DELTASONE     Take 5 mg by mouth        Red Yeast Rice 500 MG/0.5GM Powd           rosuvastatin 5 MG tablet    CRESTOR     Take 5 mg by mouth every other day    ILD (interstitial lung disease) (H), Bronchiolitis       traMADol 50 MG tablet    ULTRAM     1 tab qhs prn        * Notice:  This list has 4 medication(s) that are the same as other medications prescribed for you. Read the directions carefully, and ask your doctor or other care provider to review them with you.

## 2018-08-30 NOTE — PROGRESS NOTES
[facility]    Reason for Visit  Saira Pyle is a 68 year old year old female who is being seen for RECHECK (follow up of ILD/HX )    HPI    Ms. 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  At her last visit which was her initial visit, I started Breo Ellipta (fluticasone Vilanterol) and montelukast for her bronchiolitis.  I did not start daily azithromycin due to a past severe reaction to it (which sounds actually like sepsis from a UTI she had at the time).  Today, she reports that her breathing is improved.  She reports no side effects from either medication.  She reports that she can walk uphill with less shortness of breath than 3 months ago.  She does sometimes still feel a little short of breath when she walks up a flight of stairs if she is tired.        Current Outpatient Prescriptions   Medication     acyclovir (ZOVIRAX) 400 MG tablet     calcium carbonate-vitamin D 600-200 MG-UNIT TABS     Carboxymethylcell-Hypromellose (GENTEAL OP)     clotrimazole (LOTRIMIN) 1 % cream     estradiol (ESTRACE VAGINAL) 0.1 MG/GM cream     estradiol (YUVAFEM) 10 MCG TABS vaginal tablet     fluticasone-vilanterol (BREO ELLIPTA) 100-25 MCG/INH oral inhaler     Glucosamine-Chondroit-Vit C-Mn (GLUCOSAMINE-CHONDROITIN) TABS     hydroxypropyl methylcellulose (GENTEAL) 0.2 % SOLN ophthalmic solution     Lactobacillus Acid-Pectin (ACIDOPHILUS/PECTIN) CAPS     leflunomide (ARAVA) 10 MG tablet     MAGNESIUM PO     melatonin 3 MG tablet     montelukast (SINGULAIR) 10 MG tablet     pimecrolimus (ELIDEL) 1 % cream     predniSONE (DELTASONE) 1 MG tablet     predniSONE (DELTASONE) 5 MG tablet     RABEprazole (ACIPHEX) 20 MG EC tablet     Red Yeast Rice 500 MG/0.5GM POWD     rosuvastatin (CRESTOR) 5 MG tablet     traMADol (ULTRAM) 50 MG tablet     No current facility-administered medications for this visit.      Allergies  "  Allergen Reactions     Acetaminophen Anaphylaxis     Influenza Vaccines Anaphylaxis     Rituximab Hives and Itching     Other reaction(s): Breathing Difficulty     Cephalexin Hives     Amitriptyline      PN: LW Reaction: agitation, irritability     Azithromycin Other (See Comments)     \"systemic organ failure\"     Cephalosporins Hives     Cimetidine      PN: LW Reaction: GI Upset, vomiting     Codeine      PN: LW Reaction: Vomiting     Diazepam      hyperactive     Dronabinol Other (See Comments)     Other reaction(s): Headache  Nausea and vomiting     Fluoxetine Other (See Comments)     extreme agitation, cannot be combined for use with cipro     Metronidazole      Generalized illness     Metronidazole      Other reaction(s): Other - Describe In Comment Field  Extreme pain in legs     Miconazole      vaginal burning     Morphine Sulfate (Concentrate)      nausea and vomiting     Nitrofurantoin      Multiple organ failure     Nortriptyline Other (See Comments)     hyperactivity     Omeprazole      PN: LW Reaction: GI Upset     Thimerosal Other (See Comments)     Severe buring, lupus flare     Tioconazole Other (See Comments)     Vaginal burning and bleeding     Erythromycin Rash     malaria  type reaction - fever and hair fell out     Iodine Rash     Needs to have Betadine washed off aftr surgery     Latex Rash     Other reaction(s): Other  redness     Levofloxacin Rash     PN: LW Reaction: Unknown Reaction     Quinolones Rash     happened with levaquin and Ciprofloxacin     Sulfa Drugs Rash     Tetracycline Rash     Past Medical History:   Diagnosis Date     Bronchiolitis     small airways disease probably related to SLE     GERD (gastroesophageal reflux disease)      ILD (interstitial lung disease) (H)     Mild ILD on chest CT, probably related to SLE     Pericarditis     due to SLE, s/p pericardial window 2006     SLE (systemic lupus erythematosus related syndrome) (H)     dx in 20s; arthritis, serositis " (pericarditis and pleuritis)       No past surgical history on file.    Social History     Social History     Marital status:      Spouse name: N/A     Number of children: N/A     Years of education: N/A     Occupational History     Not on file.     Social History Main Topics     Smoking status: Never Smoker     Smokeless tobacco: Never Used     Alcohol use Not on file     Drug use: Not on file     Sexual activity: Not on file     Other Topics Concern     Not on file     Social History Narrative    .  Had exposure to second hand tobacco smoke as a child.    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    Vitals: /76 (BP Location: Right arm, Patient Position: Chair, Cuff Size: Adult Regular)  Pulse 81  Resp 16  Wt 52.5 kg (115 lb 11.2 oz)  SpO2 97%    Exam:   GENERAL APPEARANCE: Well developed, well nourished, alert, and in no apparent distress.  RESP: good air flow throughout.  Bibasilar inspiratory crackles. No rhonchi. No wheezes.  CV: Normal S1, S2, regular rhythm, normal rate. No murmur.  No LE edema.   MS: extremities normal. No clubbing. No cyanosis.  SKIN: no rash on limited exam.  NEURO: Mentation intact, speech normal, normal gait and stance.  PSYCH: mentation appears normal. and affect normal/bright.    Results:  Recent Results (from the past 168 hour(s))   General PFT Lab (Please always keep  checked)    Collection Time: 08/30/18  9:02 AM   Result Value Ref Range    FVC-Pred 2.67 L    FVC-Pre 2.20 L    FVC-%Pred-Pre 82 %    FEV1-Pre 1.69 L    FEV1-%Pred-Pre 80 %    FEV1FVC-Pred 77 %    FEV1FVC-Pre 77 %    FEFMax-Pred 5.48 L/sec    FEFMax-Pre 6.57 L/sec    FEFMax-%Pred-Pre 119 %    FEF2575-Pred 1.89 L/sec    FEF2575-Pre 1.36 L/sec    ENO5424-%Pred-Pre 71 %    ExpTime-Pre 7.74 sec    FIFMax-Pre 5.23 L/sec    VC-Pred 2.76 L    VC-Pre 2.18 L    VC-%Pred-Pre 79 %    IC-Pred 1.94 L    IC-Pre 1.67 L    IC-%Pred-Pre 86 %    ERV-Pred 0.82 L    ERV-Pre 0.51 L    ERV-%Pred-Pre 61 %    FEV1FEV6-Pred 80 %    FEV1FEV6-Pre 77 %    FRCPleth-Pred 2.54 L    FRCPleth-Pre 1.98 L    FRCPleth-%Pred-Pre 77 %    RVPleth-Pred 1.86 L    RVPleth-Pre 1.47 L    RVPleth-%Pred-Pre 79 %    TLCPleth-Pred 4.44 L    TLCPleth-Pre 3.65 L    TLCPleth-%Pred-Pre 82 %    DLCOunc-Pred 19.73 ml/min/mmHg    DLCOunc-Pre 14.90 ml/min/mmHg    DLCOunc-%Pred-Pre 75 %    VA-Pre 3.06 L    VA-%Pred-Pre 66 %    FEV1SVC-Pred 76 %    FEV1SVC-Pre 78 %       I reviewed pulmonary function test that was performed today.  This shows normal spirometry and lung volumes and diffusion.  Lung function is improved compared to 3 months ago.    I reviewed results with the patient.      Assessment and plan:   Ms. Bhaskar Pyle is a 68-year-old who is here to follow-up mild ILD and bronchiolitis due to lupus.  1.  Bronchiolitis.  Her dyspnea on exertion is improved compared to 3 months ago.  She has had a good response to Breo Ellipta and montelukast.  We will continue both medications.  She will return in 6 months with full PFT.  I will not add daily azithromycin due to past severe reaction associated with it.  I have encouraged her to stay active.  I would not treat her interstitial lung disease at this time as it is mild, and her PFT is now normal.

## 2018-08-30 NOTE — NURSING NOTE
Chief Complaint   Patient presents with     RECHECK     follow up of ILD/HX      Usha Garcia CMA

## 2018-08-31 LAB
DLCOUNC-%PRED-PRE: 75 %
DLCOUNC-PRE: 14.9 ML/MIN/MMHG
DLCOUNC-PRED: 19.73 ML/MIN/MMHG
ERV-%PRED-PRE: 61 %
ERV-PRE: 0.51 L
ERV-PRED: 0.82 L
EXPTIME-PRE: 7.74 SEC
FEF2575-%PRED-PRE: 71 %
FEF2575-PRE: 1.36 L/SEC
FEF2575-PRED: 1.89 L/SEC
FEFMAX-%PRED-PRE: 119 %
FEFMAX-PRE: 6.57 L/SEC
FEFMAX-PRED: 5.48 L/SEC
FEV1-%PRED-PRE: 80 %
FEV1-PRE: 1.69 L
FEV1FEV6-PRE: 77 %
FEV1FEV6-PRED: 80 %
FEV1FVC-PRE: 77 %
FEV1FVC-PRED: 77 %
FEV1SVC-PRE: 78 %
FEV1SVC-PRED: 76 %
FIFMAX-PRE: 5.23 L/SEC
FRCPLETH-%PRED-PRE: 77 %
FRCPLETH-PRE: 1.98 L
FRCPLETH-PRED: 2.54 L
FVC-%PRED-PRE: 82 %
FVC-PRE: 2.2 L
FVC-PRED: 2.67 L
IC-%PRED-PRE: 86 %
IC-PRE: 1.67 L
IC-PRED: 1.94 L
RVPLETH-%PRED-PRE: 79 %
RVPLETH-PRE: 1.47 L
RVPLETH-PRED: 1.86 L
TLCPLETH-%PRED-PRE: 82 %
TLCPLETH-PRE: 3.65 L
TLCPLETH-PRED: 4.44 L
VA-%PRED-PRE: 66 %
VA-PRE: 3.06 L
VC-%PRED-PRE: 79 %
VC-PRE: 2.18 L
VC-PRED: 2.76 L

## 2018-09-17 ENCOUNTER — HOSPITAL ENCOUNTER (OUTPATIENT)
Dept: LAB | Facility: CLINIC | Age: 68
Discharge: HOME | End: 2018-09-17
Attending: ORTHOPAEDIC SURGERY | Admitting: ORTHOPAEDIC SURGERY
Payer: COMMERCIAL

## 2018-09-17 DIAGNOSIS — Z01.812 PRE-OPERATIVE LABORATORY EXAMINATION: ICD-10-CM

## 2018-09-17 LAB
MRSA DNA SPEC QL NAA+PROBE: NEGATIVE
SPECIMEN SOURCE: NORMAL

## 2018-09-17 PROCEDURE — 40000830 ZZHCL STATISTIC STAPH AUREUS METH RESIST SCREEN PCR: Performed by: ORTHOPAEDIC SURGERY

## 2018-09-17 PROCEDURE — 87640 STAPH A DNA AMP PROBE: CPT | Performed by: ORTHOPAEDIC SURGERY

## 2018-09-17 PROCEDURE — 87641 MR-STAPH DNA AMP PROBE: CPT | Performed by: ORTHOPAEDIC SURGERY

## 2018-09-17 PROCEDURE — 40000829 ZZHCL STATISTIC STAPH AUREUS SUSCEPT SCREEN PCR: Performed by: ORTHOPAEDIC SURGERY

## 2018-10-01 RX ORDER — FAMOTIDINE 20 MG
1000 TABLET ORAL 2 TIMES DAILY
COMMUNITY

## 2018-10-01 RX ORDER — PREDNISONE 1 MG/1
3 TABLET ORAL
COMMUNITY

## 2018-10-02 ENCOUNTER — ANESTHESIA (OUTPATIENT)
Dept: SURGERY | Facility: CLINIC | Age: 68
DRG: 470 | End: 2018-10-02
Payer: COMMERCIAL

## 2018-10-02 ENCOUNTER — APPOINTMENT (OUTPATIENT)
Dept: GENERAL RADIOLOGY | Facility: CLINIC | Age: 68
DRG: 470 | End: 2018-10-02
Attending: PHYSICIAN ASSISTANT
Payer: COMMERCIAL

## 2018-10-02 ENCOUNTER — APPOINTMENT (OUTPATIENT)
Dept: PHYSICAL THERAPY | Facility: CLINIC | Age: 68
DRG: 470 | End: 2018-10-02
Attending: ORTHOPAEDIC SURGERY
Payer: COMMERCIAL

## 2018-10-02 ENCOUNTER — HOSPITAL ENCOUNTER (INPATIENT)
Facility: CLINIC | Age: 68
LOS: 3 days | Discharge: HOME | DRG: 470 | End: 2018-10-05
Attending: ORTHOPAEDIC SURGERY | Admitting: ORTHOPAEDIC SURGERY
Payer: COMMERCIAL

## 2018-10-02 ENCOUNTER — ANESTHESIA EVENT (OUTPATIENT)
Dept: SURGERY | Facility: CLINIC | Age: 68
DRG: 470 | End: 2018-10-02
Payer: COMMERCIAL

## 2018-10-02 DIAGNOSIS — Z96.651 S/P TOTAL KNEE ARTHROPLASTY, RIGHT: Primary | ICD-10-CM

## 2018-10-02 LAB
CREAT SERPL-MCNC: 0.56 MG/DL (ref 0.52–1.04)
GFR SERPL CREATININE-BSD FRML MDRD: >90 ML/MIN/1.7M2
PLATELET # BLD AUTO: 204 10E9/L (ref 150–450)

## 2018-10-02 PROCEDURE — 25000132 ZZH RX MED GY IP 250 OP 250 PS 637: Performed by: PHYSICIAN ASSISTANT

## 2018-10-02 PROCEDURE — 71000013 ZZH RECOVERY PHASE 1 LEVEL 1 EA ADDTL HR: Performed by: ORTHOPAEDIC SURGERY

## 2018-10-02 PROCEDURE — 25000128 H RX IP 250 OP 636: Performed by: ANESTHESIOLOGY

## 2018-10-02 PROCEDURE — 12000007 ZZH R&B INTERMEDIATE

## 2018-10-02 PROCEDURE — 25000132 ZZH RX MED GY IP 250 OP 250 PS 637: Mod: GY

## 2018-10-02 PROCEDURE — 97110 THERAPEUTIC EXERCISES: CPT | Mod: GP

## 2018-10-02 PROCEDURE — 27210794 ZZH OR GENERAL SUPPLY STERILE: Performed by: ORTHOPAEDIC SURGERY

## 2018-10-02 PROCEDURE — 25000132 ZZH RX MED GY IP 250 OP 250 PS 637: Mod: GY | Performed by: ORTHOPAEDIC SURGERY

## 2018-10-02 PROCEDURE — 25000128 H RX IP 250 OP 636: Performed by: PHYSICIAN ASSISTANT

## 2018-10-02 PROCEDURE — 36415 COLL VENOUS BLD VENIPUNCTURE: CPT | Performed by: PHYSICIAN ASSISTANT

## 2018-10-02 PROCEDURE — 40000193 ZZH STATISTIC PT WARD VISIT

## 2018-10-02 PROCEDURE — 25000128 H RX IP 250 OP 636: Performed by: ORTHOPAEDIC SURGERY

## 2018-10-02 PROCEDURE — 25000125 ZZHC RX 250: Performed by: PHYSICIAN ASSISTANT

## 2018-10-02 PROCEDURE — A9270 NON-COVERED ITEM OR SERVICE: HCPCS | Mod: GY | Performed by: PHYSICIAN ASSISTANT

## 2018-10-02 PROCEDURE — 25000132 ZZH RX MED GY IP 250 OP 250 PS 637: Performed by: NURSE PRACTITIONER

## 2018-10-02 PROCEDURE — C1776 JOINT DEVICE (IMPLANTABLE): HCPCS | Performed by: ORTHOPAEDIC SURGERY

## 2018-10-02 PROCEDURE — 97161 PT EVAL LOW COMPLEX 20 MIN: CPT | Mod: GP

## 2018-10-02 PROCEDURE — 27110028 ZZH OR GENERAL SUPPLY NON-STERILE: Performed by: ORTHOPAEDIC SURGERY

## 2018-10-02 PROCEDURE — 36000063 ZZH SURGERY LEVEL 4 EA 15 ADDTL MIN: Performed by: ORTHOPAEDIC SURGERY

## 2018-10-02 PROCEDURE — 37000009 ZZH ANESTHESIA TECHNICAL FEE, EACH ADDTL 15 MIN: Performed by: ORTHOPAEDIC SURGERY

## 2018-10-02 PROCEDURE — A9270 NON-COVERED ITEM OR SERVICE: HCPCS | Mod: GY | Performed by: ORTHOPAEDIC SURGERY

## 2018-10-02 PROCEDURE — 25000128 H RX IP 250 OP 636: Performed by: NURSE ANESTHETIST, CERTIFIED REGISTERED

## 2018-10-02 PROCEDURE — 71000012 ZZH RECOVERY PHASE 1 LEVEL 1 FIRST HR: Performed by: ORTHOPAEDIC SURGERY

## 2018-10-02 PROCEDURE — 0SRC0J9 REPLACEMENT OF RIGHT KNEE JOINT WITH SYNTHETIC SUBSTITUTE, CEMENTED, OPEN APPROACH: ICD-10-PCS | Performed by: ORTHOPAEDIC SURGERY

## 2018-10-02 PROCEDURE — 40000171 ZZH STATISTIC PRE-PROCEDURE ASSESSMENT III: Performed by: ORTHOPAEDIC SURGERY

## 2018-10-02 PROCEDURE — 82565 ASSAY OF CREATININE: CPT | Performed by: PHYSICIAN ASSISTANT

## 2018-10-02 PROCEDURE — 40000986 XR KNEE PORT RT 1/2 VW: Mod: RT

## 2018-10-02 PROCEDURE — 25000125 ZZHC RX 250: Performed by: NURSE ANESTHETIST, CERTIFIED REGISTERED

## 2018-10-02 PROCEDURE — 99207 ZZC CONSULT E&M CHANGED TO INITIAL LEVEL: CPT | Performed by: NURSE PRACTITIONER

## 2018-10-02 PROCEDURE — 85049 AUTOMATED PLATELET COUNT: CPT | Performed by: PHYSICIAN ASSISTANT

## 2018-10-02 PROCEDURE — 25000125 ZZHC RX 250: Performed by: ORTHOPAEDIC SURGERY

## 2018-10-02 PROCEDURE — 97530 THERAPEUTIC ACTIVITIES: CPT | Mod: GP

## 2018-10-02 PROCEDURE — 27810169 ZZH OR IMPLANT GENERAL: Performed by: ORTHOPAEDIC SURGERY

## 2018-10-02 PROCEDURE — 25800025 ZZH RX 258: Performed by: ORTHOPAEDIC SURGERY

## 2018-10-02 PROCEDURE — 36000093 ZZH SURGERY LEVEL 4 1ST 30 MIN: Performed by: ORTHOPAEDIC SURGERY

## 2018-10-02 PROCEDURE — 99222 1ST HOSP IP/OBS MODERATE 55: CPT | Performed by: NURSE PRACTITIONER

## 2018-10-02 PROCEDURE — A9270 NON-COVERED ITEM OR SERVICE: HCPCS | Mod: GY | Performed by: NURSE PRACTITIONER

## 2018-10-02 PROCEDURE — 37000008 ZZH ANESTHESIA TECHNICAL FEE, 1ST 30 MIN: Performed by: ORTHOPAEDIC SURGERY

## 2018-10-02 DEVICE — IMPLANTABLE DEVICE: Type: IMPLANTABLE DEVICE | Site: KNEE | Status: FUNCTIONAL

## 2018-10-02 DEVICE — BONE CEMENT STRK SIMPLEX P SPEEDSET 6192-1-001: Type: IMPLANTABLE DEVICE | Site: KNEE | Status: FUNCTIONAL

## 2018-10-02 RX ORDER — ONDANSETRON 2 MG/ML
4 INJECTION INTRAMUSCULAR; INTRAVENOUS EVERY 6 HOURS PRN
Status: DISCONTINUED | OUTPATIENT
Start: 2018-10-02 | End: 2018-10-03

## 2018-10-02 RX ORDER — NALOXONE HYDROCHLORIDE 0.4 MG/ML
.1-.4 INJECTION, SOLUTION INTRAMUSCULAR; INTRAVENOUS; SUBCUTANEOUS
Status: DISCONTINUED | OUTPATIENT
Start: 2018-10-02 | End: 2018-10-05 | Stop reason: HOSPADM

## 2018-10-02 RX ORDER — METHYLPREDNISOLONE SODIUM SUCCINATE 125 MG/2ML
INJECTION, POWDER, LYOPHILIZED, FOR SOLUTION INTRAMUSCULAR; INTRAVENOUS PRN
Status: DISCONTINUED | OUTPATIENT
Start: 2018-10-02 | End: 2018-10-02

## 2018-10-02 RX ORDER — OXYCODONE HYDROCHLORIDE 5 MG/1
5-10 TABLET ORAL EVERY 4 HOURS PRN
Status: DISCONTINUED | OUTPATIENT
Start: 2018-10-02 | End: 2018-10-04

## 2018-10-02 RX ORDER — BUPIVACAINE HYDROCHLORIDE 7.5 MG/ML
INJECTION, SOLUTION INTRASPINAL PRN
Status: DISCONTINUED | OUTPATIENT
Start: 2018-10-02 | End: 2018-10-02

## 2018-10-02 RX ORDER — SODIUM CHLORIDE, SODIUM LACTATE, POTASSIUM CHLORIDE, CALCIUM CHLORIDE 600; 310; 30; 20 MG/100ML; MG/100ML; MG/100ML; MG/100ML
INJECTION, SOLUTION INTRAVENOUS CONTINUOUS
Status: DISCONTINUED | OUTPATIENT
Start: 2018-10-02 | End: 2018-10-04

## 2018-10-02 RX ORDER — CLINDAMYCIN PHOSPHATE 900 MG/50ML
900 INJECTION, SOLUTION INTRAVENOUS
Status: COMPLETED | OUTPATIENT
Start: 2018-10-02 | End: 2018-10-02

## 2018-10-02 RX ORDER — ASCORBIC ACID 500 MG
500 TABLET ORAL EVERY MORNING
Status: DISCONTINUED | OUTPATIENT
Start: 2018-10-02 | End: 2018-10-05 | Stop reason: HOSPADM

## 2018-10-02 RX ORDER — SODIUM CHLORIDE, SODIUM LACTATE, POTASSIUM CHLORIDE, CALCIUM CHLORIDE 600; 310; 30; 20 MG/100ML; MG/100ML; MG/100ML; MG/100ML
INJECTION, SOLUTION INTRAVENOUS CONTINUOUS
Status: DISCONTINUED | OUTPATIENT
Start: 2018-10-02 | End: 2018-10-02 | Stop reason: HOSPADM

## 2018-10-02 RX ORDER — MAGNESIUM HYDROXIDE 1200 MG/15ML
LIQUID ORAL PRN
Status: DISCONTINUED | OUTPATIENT
Start: 2018-10-02 | End: 2018-10-02 | Stop reason: HOSPADM

## 2018-10-02 RX ORDER — SODIUM PHOSPHATE,MONO-DIBASIC 19G-7G/118
1 ENEMA (ML) RECTAL DAILY
COMMUNITY

## 2018-10-02 RX ORDER — ONDANSETRON 2 MG/ML
INJECTION INTRAMUSCULAR; INTRAVENOUS PRN
Status: DISCONTINUED | OUTPATIENT
Start: 2018-10-02 | End: 2018-10-02

## 2018-10-02 RX ORDER — AMOXICILLIN 250 MG
2 CAPSULE ORAL 2 TIMES DAILY
Status: DISCONTINUED | OUTPATIENT
Start: 2018-10-02 | End: 2018-10-05 | Stop reason: HOSPADM

## 2018-10-02 RX ORDER — CLINDAMYCIN PHOSPHATE 900 MG/50ML
900 INJECTION, SOLUTION INTRAVENOUS SEE ADMIN INSTRUCTIONS
Status: DISCONTINUED | OUTPATIENT
Start: 2018-10-02 | End: 2018-10-02 | Stop reason: HOSPADM

## 2018-10-02 RX ORDER — FENTANYL CITRATE 50 UG/ML
25 INJECTION, SOLUTION INTRAMUSCULAR; INTRAVENOUS ONCE
Status: COMPLETED | OUTPATIENT
Start: 2018-10-02 | End: 2018-10-02

## 2018-10-02 RX ORDER — BUPIVACAINE HYDROCHLORIDE 5 MG/ML
INJECTION, SOLUTION PERINEURAL PRN
Status: DISCONTINUED | OUTPATIENT
Start: 2018-10-02 | End: 2018-10-02

## 2018-10-02 RX ORDER — ACYCLOVIR 400 MG/1
400 TABLET ORAL 2 TIMES DAILY
Status: DISCONTINUED | OUTPATIENT
Start: 2018-10-02 | End: 2018-10-02

## 2018-10-02 RX ORDER — ROSUVASTATIN CALCIUM 5 MG/1
5 TABLET, COATED ORAL EVERY OTHER DAY
Status: DISCONTINUED | OUTPATIENT
Start: 2018-10-03 | End: 2018-10-05 | Stop reason: HOSPADM

## 2018-10-02 RX ORDER — LIDOCAINE 40 MG/G
CREAM TOPICAL
Status: DISCONTINUED | OUTPATIENT
Start: 2018-10-02 | End: 2018-10-05 | Stop reason: HOSPADM

## 2018-10-02 RX ORDER — SODIUM PHOSPHATE,MONO-DIBASIC 19G-7G/118
1 ENEMA (ML) RECTAL DAILY
Status: DISCONTINUED | OUTPATIENT
Start: 2018-10-02 | End: 2018-10-05 | Stop reason: HOSPADM

## 2018-10-02 RX ORDER — PROPOFOL 10 MG/ML
INJECTION, EMULSION INTRAVENOUS CONTINUOUS PRN
Status: DISCONTINUED | OUTPATIENT
Start: 2018-10-02 | End: 2018-10-02

## 2018-10-02 RX ORDER — BUDESONIDE AND FORMOTEROL FUMARATE DIHYDRATE 80; 4.5 UG/1; UG/1
2 AEROSOL RESPIRATORY (INHALATION) 2 TIMES DAILY
COMMUNITY
End: 2019-09-12

## 2018-10-02 RX ORDER — HYDROMORPHONE HYDROCHLORIDE 1 MG/ML
.3-.5 INJECTION, SOLUTION INTRAMUSCULAR; INTRAVENOUS; SUBCUTANEOUS
Status: DISCONTINUED | OUTPATIENT
Start: 2018-10-02 | End: 2018-10-05 | Stop reason: HOSPADM

## 2018-10-02 RX ORDER — LIDOCAINE HYDROCHLORIDE 20 MG/ML
INJECTION, SOLUTION INFILTRATION; PERINEURAL PRN
Status: DISCONTINUED | OUTPATIENT
Start: 2018-10-02 | End: 2018-10-02

## 2018-10-02 RX ORDER — APREPITANT 40 MG/1
40 CAPSULE ORAL ONCE
Status: DISCONTINUED | OUTPATIENT
Start: 2018-10-02 | End: 2018-10-02 | Stop reason: HOSPADM

## 2018-10-02 RX ORDER — ACYCLOVIR 400 MG/1
400 TABLET ORAL EVERY MORNING
COMMUNITY

## 2018-10-02 RX ORDER — SODIUM CHLORIDE 9 MG/ML
INJECTION, SOLUTION INTRAVENOUS CONTINUOUS
Status: DISCONTINUED | OUTPATIENT
Start: 2018-10-02 | End: 2018-10-04

## 2018-10-02 RX ORDER — LEFLUNOMIDE 10 MG/1
10 TABLET ORAL DAILY
Status: DISCONTINUED | OUTPATIENT
Start: 2018-10-02 | End: 2018-10-02

## 2018-10-02 RX ORDER — ACYCLOVIR 800 MG/1
1600 TABLET ORAL 2 TIMES DAILY
Status: DISCONTINUED | OUTPATIENT
Start: 2018-10-02 | End: 2018-10-05 | Stop reason: HOSPADM

## 2018-10-02 RX ORDER — MONTELUKAST SODIUM 10 MG/1
10 TABLET ORAL AT BEDTIME
Status: DISCONTINUED | OUTPATIENT
Start: 2018-10-02 | End: 2018-10-05 | Stop reason: HOSPADM

## 2018-10-02 RX ORDER — AMOXICILLIN 250 MG
1 CAPSULE ORAL 2 TIMES DAILY
Status: DISCONTINUED | OUTPATIENT
Start: 2018-10-02 | End: 2018-10-05 | Stop reason: HOSPADM

## 2018-10-02 RX ORDER — ACYCLOVIR 200 MG/1
1600 CAPSULE ORAL 2 TIMES DAILY
Status: DISCONTINUED | OUTPATIENT
Start: 2018-10-02 | End: 2018-10-02 | Stop reason: ALTCHOICE

## 2018-10-02 RX ORDER — CLOTRIMAZOLE 1 %
1 CREAM WITH APPLICATOR VAGINAL
Status: DISCONTINUED | OUTPATIENT
Start: 2018-10-09 | End: 2018-10-05 | Stop reason: HOSPADM

## 2018-10-02 RX ORDER — ONDANSETRON 4 MG/1
4 TABLET, ORALLY DISINTEGRATING ORAL EVERY 6 HOURS PRN
Status: DISCONTINUED | OUTPATIENT
Start: 2018-10-02 | End: 2018-10-03

## 2018-10-02 RX ORDER — PANTOPRAZOLE SODIUM 40 MG/1
40 TABLET, DELAYED RELEASE ORAL EVERY EVENING
Status: DISCONTINUED | OUTPATIENT
Start: 2018-10-02 | End: 2018-10-05 | Stop reason: HOSPADM

## 2018-10-02 RX ADMIN — OXYCODONE HYDROCHLORIDE AND ACETAMINOPHEN 500 MG: 500 TABLET ORAL at 14:23

## 2018-10-02 RX ADMIN — CLINDAMYCIN PHOSPHATE 900 MG: 18 INJECTION, SOLUTION INTRAVENOUS at 09:10

## 2018-10-02 RX ADMIN — MELATONIN 5 MG: 3 TAB ORAL at 21:12

## 2018-10-02 RX ADMIN — LIDOCAINE HYDROCHLORIDE 100 MG: 20 INJECTION, SOLUTION INFILTRATION; PERINEURAL at 09:15

## 2018-10-02 RX ADMIN — ONDANSETRON 4 MG: 2 INJECTION INTRAMUSCULAR; INTRAVENOUS at 10:16

## 2018-10-02 RX ADMIN — BUPIVACAINE HYDROCHLORIDE IN DEXTROSE 12.5 MG: 7.5 INJECTION, SOLUTION SUBARACHNOID at 09:12

## 2018-10-02 RX ADMIN — SODIUM CHLORIDE, POTASSIUM CHLORIDE, SODIUM LACTATE AND CALCIUM CHLORIDE: 600; 310; 30; 20 INJECTION, SOLUTION INTRAVENOUS at 10:20

## 2018-10-02 RX ADMIN — OXYCODONE HYDROCHLORIDE 5 MG: 5 TABLET ORAL at 14:17

## 2018-10-02 RX ADMIN — PANTOPRAZOLE SODIUM 40 MG: 40 TABLET, DELAYED RELEASE ORAL at 19:01

## 2018-10-02 RX ADMIN — SODIUM CHLORIDE, POTASSIUM CHLORIDE, SODIUM LACTATE AND CALCIUM CHLORIDE: 600; 310; 30; 20 INJECTION, SOLUTION INTRAVENOUS at 08:30

## 2018-10-02 RX ADMIN — TRANEXAMIC ACID 1 G: 100 INJECTION, SOLUTION INTRAVENOUS at 10:11

## 2018-10-02 RX ADMIN — VITAMIN D, TAB 1000IU (100/BT) 1000 UNITS: 25 TAB at 21:12

## 2018-10-02 RX ADMIN — FENTANYL CITRATE 25 MCG: 50 INJECTION INTRAMUSCULAR; INTRAVENOUS at 08:54

## 2018-10-02 RX ADMIN — PHENYLEPHRINE HYDROCHLORIDE 50 MCG: 10 INJECTION, SOLUTION INTRAMUSCULAR; INTRAVENOUS; SUBCUTANEOUS at 10:11

## 2018-10-02 RX ADMIN — MONTELUKAST SODIUM 10 MG: 10 TABLET, FILM COATED ORAL at 21:13

## 2018-10-02 RX ADMIN — ACYCLOVIR 1600 MG: 800 TABLET ORAL at 21:13

## 2018-10-02 RX ADMIN — PHENYLEPHRINE HYDROCHLORIDE 50 MCG: 10 INJECTION, SOLUTION INTRAMUSCULAR; INTRAVENOUS; SUBCUTANEOUS at 09:46

## 2018-10-02 RX ADMIN — SENNOSIDES AND DOCUSATE SODIUM 2 TABLET: 8.6; 5 TABLET ORAL at 21:13

## 2018-10-02 RX ADMIN — BUPIVACAINE HYDROCHLORIDE 20 ML: 5 INJECTION, SOLUTION PERINEURAL at 08:43

## 2018-10-02 RX ADMIN — SODIUM CHLORIDE, POTASSIUM CHLORIDE, SODIUM LACTATE AND CALCIUM CHLORIDE: 600; 310; 30; 20 INJECTION, SOLUTION INTRAVENOUS at 14:19

## 2018-10-02 RX ADMIN — SENNOSIDES AND DOCUSATE SODIUM 1 TABLET: 8.6; 5 TABLET ORAL at 14:22

## 2018-10-02 RX ADMIN — PHENYLEPHRINE HYDROCHLORIDE 50 MCG: 10 INJECTION, SOLUTION INTRAMUSCULAR; INTRAVENOUS; SUBCUTANEOUS at 09:58

## 2018-10-02 RX ADMIN — OXYCODONE HYDROCHLORIDE 10 MG: 5 TABLET ORAL at 20:08

## 2018-10-02 RX ADMIN — Medication 1 CAPSULE: at 15:50

## 2018-10-02 RX ADMIN — PROPOFOL 150 MCG/KG/MIN: 10 INJECTION, EMULSION INTRAVENOUS at 09:10

## 2018-10-02 RX ADMIN — OXYCODONE HYDROCHLORIDE 10 MG: 5 TABLET ORAL at 23:52

## 2018-10-02 RX ADMIN — TRANEXAMIC ACID 1 G: 100 INJECTION, SOLUTION INTRAVENOUS at 09:14

## 2018-10-02 RX ADMIN — Medication 0.5 MG: at 18:11

## 2018-10-02 RX ADMIN — MIDAZOLAM HYDROCHLORIDE 1 MG: 1 INJECTION, SOLUTION INTRAMUSCULAR; INTRAVENOUS at 08:53

## 2018-10-02 RX ADMIN — PHENYLEPHRINE HYDROCHLORIDE 50 MCG: 10 INJECTION, SOLUTION INTRAMUSCULAR; INTRAVENOUS; SUBCUTANEOUS at 10:16

## 2018-10-02 RX ADMIN — MAGNESIUM 64 MG (MAGNESIUM CHLORIDE) TABLET,DELAYED RELEASE 535 MG: at 21:12

## 2018-10-02 RX ADMIN — Medication 2 TABLET: at 14:23

## 2018-10-02 RX ADMIN — PHENYLEPHRINE HYDROCHLORIDE 50 MCG: 10 INJECTION, SOLUTION INTRAMUSCULAR; INTRAVENOUS; SUBCUTANEOUS at 09:53

## 2018-10-02 RX ADMIN — Medication 0.3 MG: at 15:50

## 2018-10-02 RX ADMIN — VITAMIN D, TAB 1000IU (100/BT) 1000 UNITS: 25 TAB at 14:22

## 2018-10-02 RX ADMIN — PHENYLEPHRINE HYDROCHLORIDE 50 MCG: 10 INJECTION, SOLUTION INTRAMUSCULAR; INTRAVENOUS; SUBCUTANEOUS at 10:04

## 2018-10-02 RX ADMIN — PHENYLEPHRINE HYDROCHLORIDE 50 MCG: 10 INJECTION, SOLUTION INTRAMUSCULAR; INTRAVENOUS; SUBCUTANEOUS at 09:38

## 2018-10-02 RX ADMIN — DEXTRAN 70 AND HYPROMELLOSE 2910 1 DROP: 1; 3 SOLUTION/ DROPS OPHTHALMIC at 21:13

## 2018-10-02 RX ADMIN — METHYLPREDNISOLONE 125 MG: 125 INJECTION, POWDER, LYOPHILIZED, FOR SOLUTION INTRAMUSCULAR; INTRAVENOUS at 09:10

## 2018-10-02 ASSESSMENT — LIFESTYLE VARIABLES: TOBACCO_USE: 0

## 2018-10-02 ASSESSMENT — COPD QUESTIONNAIRES: COPD: 0

## 2018-10-02 ASSESSMENT — ACTIVITIES OF DAILY LIVING (ADL)
ADLS_ACUITY_SCORE: 9

## 2018-10-02 NOTE — ANESTHESIA PREPROCEDURE EVALUATION
Anesthesia Evaluation     . Pt has had prior anesthetic.     No history of anesthetic complications          ROS/MED HX    ENT/Pulmonary: Comment: Corneal and retinal disease    (+)sleep apnea, uses CPAP , . Other pulmonary disease Restrictive and bronchiole disease..   (-) tobacco use, asthma and COPD   Neurologic:       Cardiovascular: Comment: Prolonged QT    (+) --CAD, --. : . . . :. valvular problems/murmurs type: MVP .      (-) hypertension, dyslipidemia, stent and CABG   METS/Exercise Tolerance:     Hematologic: Comments: Lupus         Musculoskeletal:         GI/Hepatic:     (+) GERD      (-) liver disease   Renal/Genitourinary:      (-) renal disease   Endo:      (-) Type I DM and Type II DM   Psychiatric:         Infectious Disease:   (+) Other Infectious Disease recurrent C. Diff.      Malignancy:         Other:                     Physical Exam  Normal systems: pulmonary    Airway   Mallampati: II  TM distance: >3 FB  Neck ROM: full    Dental   (+) chipped  Comment: Chipped incisors    Cardiovascular   Rhythm and rate: regular and normal      Pulmonary                     Anesthesia Plan      History & Physical Review  History and physical reviewed and following examination; no interval change.    ASA Status:  3 .    NPO Status:  > 8 hours    Plan for Spinal and Periph. Nerve Block for postop pain   PONV prophylaxis:  Ondansetron (or other 5HT-3) and Dexamethasone or Solumedrol  Stress dose methylprednisolone  Sensitive to Fentanyl      Postoperative Care  Postoperative pain management:  Peripheral nerve block (Single Shot) and Multi-modal analgesia.      Consents  Anesthetic plan, risks, benefits and alternatives discussed with:  Patient..                          .

## 2018-10-02 NOTE — PROGRESS NOTES
10/02/18 1600   Quick Adds   Type of Visit Initial PT Evaluation   Living Environment   Lives With spouse   Living Arrangements house   Home Accessibility stairs to enter home;stairs within home   Number of Stairs to Enter Home 2   Number of Stairs Within Home 1   Stair Railings at Home outside, present on right side   Self-Care   Usual Activity Tolerance good   Current Activity Tolerance moderate   Equipment Currently Used at Home (has 4WW, shower chair, leg , and sock aide)   Functional Level Prior   Ambulation 0-->independent   Transferring 0-->independent   Toileting 0-->independent   Bathing 0-->independent   Dressing 0-->independent   Eating 0-->independent   Communication 0-->understands/communicates without difficulty   Swallowing 0-->swallows foods/liquids without difficulty   Cognition 0 - no cognition issues reported   Fall history within last six months no   General Information   Onset of Illness/Injury or Date of Surgery - Date 10/02/18   Referring Physician Maris Peters PA-C   Patient/Family Goals Statement Return home    Pertinent History of Current Problem (include personal factors and/or comorbidities that impact the POC) Patient is POD-0 from R TKA with WBAT on R LE. Patient with PMH of mitral valve disorder, CAD, and osteopenia.    Precautions/Limitations fall precautions   Weight-Bearing Status - LUE full weight-bearing   Weight-Bearing Status - RUE full weight-bearing   Weight-Bearing Status - LLE full weight-bearing   Weight-Bearing Status - RLE weight-bearing as tolerated   General Observations Patient supine in bed upon arrival of therapist, agreeable to working with PT.    Cognitive Status Examination   Orientation orientation to person, place and time   Level of Consciousness alert   Follows Commands and Answers Questions able to follow multistep instructions;100% of the time   Pain Assessment   Patient Currently in Pain Yes, see Vital Sign flowsheet  (does not rate on scale of  0-10)   Integumentary/Edema   Integumentary/Edema Comments Dressing intact on R LE with hemovac drain present    Posture    Posture Forward head position   Right Knee Extension/Flexion ROM   Right Knee Extension/Flexion AROM - degrees 0-7-78   Strength   Strength Comments Not formally assessed but at least 3+/5 with functional transfers and gait    Bed Mobility   Bed Mobility Comments Supine>sit completed with Min A to guide R LE to EOB    Transfer Skills   Transfer Comments Sit>stand completed with CGA and use of FWW, cues for proper hand placement on EOB    Gait   Gait Comments Patient ambulated 8 feet to bathroom with use of FWW and CGA. Cues for proper gait progression, patient steady with ues of FWW   Balance   Balance Comments Sitting balance at EOB good with supervision, standing balance with use of FWW for static and dynamic balance with gait good with CGA   Sensory Examination   Sensory Perception Comments Notes minor numbness in foot   General Therapy Interventions   Planned Therapy Interventions bed mobility training;gait training;ROM;strengthening;transfer training;home program guidelines   Clinical Impression   Criteria for Skilled Therapeutic Intervention yes, treatment indicated   PT Diagnosis Impaired gait   Influenced by the following impairments pain, decreased ROM, weakness   Functional limitations due to impairments bed mobility, transfers, gait, stairs    Clinical Presentation Stable/Uncomplicated   Clinical Presentation Rationale Based on PMH, current presentation, and social support   Clinical Decision Making (Complexity) Low complexity   Therapy Frequency` 2 times/day   Predicted Duration of Therapy Intervention (days/wks) 3 days   Anticipated Equipment Needs at Discharge (possible FWW? )   Anticipated Discharge Disposition Home with Assist;Home with Outpatient Therapy   Risk & Benefits of therapy have been explained Yes   Patient, Family & other staff in agreement with plan of care Yes  "  [facility] TM \"6 Clicks\"   2016, Trustees of [facility], under license to [employer].  All rights reserved.   6 Clicks Short Forms Basic Mobility Inpatient Short Form   [facility]  \"6 Clicks\" V.2 Basic Mobility Inpatient Short Form   1. Turning from your back to your side while in a flat bed without using bedrails? 3 - A Little   2. Moving from lying on your back to sitting on the side of a flat bed without using bedrails? 3 - A Little   3. Moving to and from a bed to a chair (including a wheelchair)? 3 - A Little   4. Standing up from a chair using your arms (e.g., wheelchair, or bedside chair)? 3 - A Little   5. To walk in hospital room? 3 - A Little   6. Climbing 3-5 steps with a railing? 2 - A Lot   Basic Mobility Raw Score (Score out of 24.Lower scores equate to lower levels of function) 17   Total Evaluation Time   Total Evaluation Time (Minutes) 10     "

## 2018-10-02 NOTE — PROGRESS NOTES
Admission medication history interview status for the 10/2/2018  admission is complete. See EPIC admission navigator for prior to admission medications     Medication history source reliability:Good    Medication history interview source(s):Patient    Medication history resources (including written lists, pill bottles, clinic record): written list    [facility]    Additional medication history information not noted on PTA med list :None    Time spent in this activity:  45 mins    Prior to Admission medications    Medication Sig Last Dose Taking? Auth Provider   ACYCLOVIR PO Take 1,600 mg by mouth 2 times daily (4 x 400mg = 1600mg) 10/2/2018 at 0400 Yes Reported, Patient   Ascorbic Acid (VITAMIN C PO) Take 500 mg by mouth every morning  10/1/2018 at 0600 Yes Reported, Patient   ASPIRIN PO Take 81 mg by mouth every other day  9/30/2018 Yes Reported, Patient   budesonide-formoterol (SYMBICORT) 80-4.5 MCG/ACT Inhaler Inhale 2 puffs into the lungs 2 times daily 10/2/2018 at 0400 Yes Reported, Patient   calcium carbonate-vitamin D 600-200 MG-UNIT TABS Take 2 tablets by mouth daily  10/1/2018 at 2000 Yes Reported, Patient   glucosamine-chondroitin 500-400 MG CAPS per capsule Take 1 capsule by mouth daily 9/29/2018 at am Yes Reported, Patient   hydroxypropyl methylcellulose (GENTEAL) 0.2 % SOLN ophthalmic solution Place 1 drop into both eyes At Bedtime  10/1/2018 at 2000 Yes Reported, Patient   LACTOBACILLUS ACID-PECTIN PO Take 1 tablet by mouth daily 10/1/2018 at 0600 Yes Reported, Patient   leflunomide (ARAVA) 10 MG tablet Take 10 mg by mouth daily  9/25/2018 at am Yes Reported, Patient   MAGNESIUM PO Take 500 mg by mouth At Bedtime  10/1/2018 at 2000 Yes Reported, Patient   MELATONIN PO Take 5 mg by mouth At Bedtime 10/1/2018 at 2000 Yes Reported, Patient   montelukast (SINGULAIR) 10 MG tablet Take 1 tablet (10 mg) by mouth At Bedtime 10/1/2018 at 2000 Yes Gricelda Barlow MD   PREDNISONE  PO Take 3 mg by mouth daily 10/2/2018 at 0400 Yes Reported, Patient   RABEprazole (ACIPHEX) 20 MG EC tablet Take 20 mg by mouth every evening  10/1/2018 at 2000 Yes Reported, Patient   rosuvastatin (CRESTOR) 5 MG tablet Take 5 mg by mouth every other day  10/1/2018 at 0600 Yes Reported, Patient   VITAMIN D, CHOLECALCIFEROL, PO Take 1,000 Units by mouth 2 times daily 10/1/2018 at 2000 Yes Reported, Patient   clotrimazole (LOTRIMIN) 1 % cream Place 1 Applicatorful vaginally every 14 days Alternate with estradiol 9/25/2018  Reported, Patient   estradiol (ESTRACE VAGINAL) 0.1 MG/GM cream APPLY 1 GRAM VAGINALLY EVERY OTHER WEEK. USE SPARINGLY. ALTERNATE WITH CLOTRIMAZOLE 2 weeks ago  Reported, Patient   pimecrolimus (ELIDEL) 1 % cream Apply topically daily as needed  over a month  Reported, Patient

## 2018-10-02 NOTE — PLAN OF CARE
Problem: Patient Care Overview  Goal: Plan of Care/Patient Progress Review  PT:  Patient admitted on 10/2/18 and is now POD-0 from R TKA with WBAT on R LE. Patient lives in a house with her spouse with 2 steps to enter with single railing access and 1 step within the home. Patient is independent at baseline with no AD.     Discharge Planner PT   Patient plan for discharge: Home with assist from spouse  Current status: Educated patient on role of PT and PT POC. Educated and instructed patient in supine TKA exercises with cues for technique, patient able to complete with no assistance from therapist and tolerated well. R knee ROM noted as 0-7-78 degrees this date. Patient completed supine<>sit with Min A to assist in bringing R LE to EOB. Sit<>stand completed with CGA and use of FWW, cues for hand placement. Patient utilized restroom with supervision. Patient ambulated 8 feet and 15 feet with use of FWW and CGA, cues for proper gait progression with use of FWW. Patient supine in bed at end of session with all needs in reach and bed alarm on, ice to R knee.   Barriers to return to prior living situation: current level of assistance, decreased tolerance to activity   Recommendations for discharge: Home with assist and OP therapy   Rationale for recommendations: Patient is POD-0 from elective TKA, anticipate patient will reach level of independence for safe discharge home with assist from spouse for household tasks and OP therapy.        Entered by: Tameka Block 10/02/2018 5:15 PM

## 2018-10-02 NOTE — ANESTHESIA CARE TRANSFER NOTE
Patient: Saira Pyle    Procedure(s):  RIGHT TOTAL KNEE ARTHROPLASTY  - Wound Class: I-Clean    Diagnosis: DJD  Diagnosis Additional Information: No value filed.    Anesthesia Type:   Spinal, Periph. Nerve Block for postop pain     Note:  Airway :Face Mask  Patient transferred to:PACU  Handoff Report: Identifed the Patient, Identified the Reponsible Provider, Reviewed the pertinent medical history, Discussed the surgical course, Reviewed Intra-OP anesthesia mangement and issues during anesthesia, Set expectations for post-procedure period and Allowed opportunity for questions and acknowledgement of understanding      Vitals: (Last set prior to Anesthesia Care Transfer)    CRNA VITALS  10/2/2018 0955 - 10/2/2018 1031      10/2/2018             NIBP: 95/64    Pulse: 79    NIBP Mean: 72    SpO2: 100 %    Resp Rate (set): 10                Electronically Signed By: Kimmy Tom  October 2, 2018  10:31 AM

## 2018-10-02 NOTE — IP AVS SNAPSHOT
[address] [facility]    [address] [place] [address]    Phone:  [phone]                                       After Visit Summary   10/2/2018    Saira Pyle    MRN: [id number]           After Visit Summary Signature Page     I have received my discharge instructions, and my questions have been answered. I have discussed any challenges I see with this plan with the nurse or doctor.    ..........................................................................................................................................  Patient/Patient Representative Signature      ..........................................................................................................................................  Patient Representative Print Name and Relationship to Patient    ..................................................               ................................................  Date                                   Time    ..........................................................................................................................................  Reviewed by Signature/Title    ...................................................              ..............................................  Date                                               Time          22EPIC Rev 08/18

## 2018-10-02 NOTE — BRIEF OP NOTE
[facility] Brief Operative Note    Pre-operative diagnosis: DJD   Post-operative diagnosis S/p right total knee arthroplasty   Procedure: Procedure(s):  RIGHT TOTAL KNEE ARTHROPLASTY  - Wound Class: I-Clean   Surgeon(s): Surgeon(s) and Role:     * Kelsie Hardin MD - Primary     * Maris Peters PA-C - Assisting   Estimated blood loss: 15 mL    Specimens: * No specimens in log *   Findings: None    RIGHT KNEE HVAC*1    Maris Peters  10:44 AM

## 2018-10-02 NOTE — IP AVS SNAPSHOT
"                  MRN:[id number]                      After Visit Summary   10/2/2018    Saira Pyle    MRN: [id number]           Thank you!     Thank you for choosing [facility] for your care. Our goal is always to provide you with excellent care. Hearing back from our patients is one way we can continue to improve our services. Please take a few minutes to complete the written survey that you may receive in the mail after you visit with us. Thank you!        Patient Information     Date Of Birth          1950        Designated Caregiver       Most Recent Value    Caregiver    Will someone help with your care after discharge? yes    Name of designated caregiver Ernie    Phone number of caregiver [phone]    Caregiver address see facesheet      About your hospital stay     You were admitted on:  October 2, 2018 You last received care in the:  [place] [address] [facility]    You were discharged on:  October 5, 2018        Reason for your hospital stay       S/p Right TKA                  Who to Call     For medical emergencies, please call 911.  For non-urgent questions about your medical care, please call your primary care provider or clinic, [phone]  For questions related to your surgery, please call your surgery clinic        Attending Provider     Provider Kelsie Mahajan MD Orthopedics       Primary Care Provider Office Phone # Fax #    Donnapo Thanh Olivares [phone]      After Care Instructions     Activity       Your activity upon discharge:Weight bear as tolerated. May discontinue knee immobilizer and use \"prn\".  Patient is to attend PT 2-3x per week. This is prearranged by patient.     GOAL: 90 degrees active flexion at surgical leg-2 week post op visit.            Wound care and dressings       Instructions to care for your wound at home: keep wound clean and dry.  Okay to shower after 24 hours at home.                  Follow-up " "Appointments     Follow-up and recommended labs and tests        Follow up at Dr Hardin' office at 2 weeks post op for staple removal. Call Jose M for appt at [phone]                  Your next 10 appointments already scheduled     Mar 07, 2019 10:00 AM CST   FULL PULMONARY FUNCTION with [facility] PFL ANNIKA   [facility] Pulmonary Function Testing ([facility])    [address]  3rd Floor  [place] [address] [phone]            Mar 07, 2019 11:00 AM CST   (Arrive by 10:45 AM)   Return Interstitial Lung with Petra Valentin MD   [facility] ([facility])    [address]  [address]  [place] [address] [phone]              Pending Results     No orders found from 2018 to 10/3/2018.            Admission Information     Date & Time Provider Department Dept. Phone    10/2/2018 Kelsie Hardin MD [place] [address] [facility] [phone]      Your Vitals Were     Blood Pressure Pulse Temperature Respirations Height Weight    92/54 80 98.8  F (37.1  C) 16 1.549 m (5' 1\") 51.9 kg (114 lb 6.4 oz)    Pulse Oximetry BMI (Body Mass Index)                95% 21.62 kg/m2          MyChart Information     [employer] lets you send messages to your doctor, view your test results, renew your prescriptions, schedule appointments and more. To sign up, go to [email or web address] . Click on \"Log in\" on the left side of the screen, which will take you to the Welcome page. Then click on \"Sign up Now\" on the right side of the page.     You will be asked to enter the access code listed below, as well as some personal information. Please follow the directions to create your username and password.     Your access code is: [email or web address]  Expires: 2018  1:03 PM     Your access code will  in 90 days. If you need help or a new code, please call your [facility] or [phone].        Care EveryWhere ID  "    This is your Care EveryWhere ID. This could be used by other organizations to access your [facility] medical records  [id number]        Equal Access to Services     GISSEL NIXON : Hadii aad ku hadaliyaina Perez, sanyacarl duartefrancheskaha, tato catywiley crowley, natasha darnellin hayaatod negronjanette landon ladeniztod mony [facility] [phone].    ATENCIÓN: Si habla español, tiene a zhang disposición servicios gratuitos de asistencia lingüística. Llame al [phone].    We comply with applicable federal civil rights laws and [place] laws. We do not discriminate on the basis of race, color, national origin, age, disability, sex, sexual orientation, or gender identity.               Review of your medicines      START taking        Dose / Directions    enoxaparin 40 MG/0.4ML injection   Commonly known as:  LOVENOX        Dose:  40 mg   Start taking on:  10/6/2018   Inject 0.4 mLs (40 mg) Subcutaneous every 24 hours   Quantity:  14 Syringe   Refills:  0       order for DME        Equipment being ordered: Walker Wheels () and Walker () Treatment Diagnosis: impaired gait   Quantity:  1 each   Refills:  0       oxyCODONE IR 5 MG tablet   Commonly known as:  ROXICODONE        Dose:  5 mg   Take 1 tablet (5 mg) by mouth every 4 hours as needed for moderate to severe pain   Quantity:  25 tablet   Refills:  0       senna-docusate 8.6-50 MG per tablet   Commonly known as:  SENOKOT-S;PERICOLACE        Dose:  1 tablet   Take 1 tablet by mouth 2 times daily   Quantity:  28 tablet   Refills:  0         CONTINUE these medicines which have NOT CHANGED        Dose / Directions    ACIPHEX 20 MG EC tablet   Generic drug:  RABEprazole        Dose:  20 mg   Take 20 mg by mouth every evening   Refills:  0       ACYCLOVIR PO   Indication:  Herpes Simplex Infection        Dose:  1600 mg   Take 1,600 mg by mouth 2 times daily (4 x 400mg = 1600mg)   Refills:  0       budesonide-formoterol 80-4.5 MCG/ACT Inhaler   Commonly known as:  SYMBICORT        Dose:   2 puff   Inhale 2 puffs into the lungs 2 times daily   Refills:  0       calcium carbonate-vitamin D 600-200 MG-UNIT Tabs        Dose:  2 tablet   Take 2 tablets by mouth daily   Refills:  0       clotrimazole 1 % cream   Commonly known as:  LOTRIMIN        Dose:  1 Applicatorful   Place 1 Applicatorful vaginally every 14 days Alternate with estradiol   Refills:  0       ESTRACE VAGINAL 0.1 MG/GM cream   Generic drug:  estradiol        APPLY 1 GRAM VAGINALLY EVERY OTHER WEEK. USE SPARINGLY. ALTERNATE WITH CLOTRIMAZOLE   Refills:  0       glucosamine-chondroitin 500-400 MG Caps per capsule        Dose:  1 capsule   Take 1 capsule by mouth daily   Refills:  0       hydroxypropyl methylcellulose 0.2 % Soln ophthalmic solution   Commonly known as:  GENTEAL        Dose:  1 drop   Place 1 drop into both eyes At Bedtime   Refills:  0       LACTOBACILLUS ACID-PECTIN PO        Dose:  1 tablet   Take 1 tablet by mouth daily   Refills:  0       leflunomide 10 MG tablet   Commonly known as:  ARAVA        Dose:  10 mg   Take 10 mg by mouth daily   Refills:  0       MAGNESIUM PO        Dose:  500 mg   Take 500 mg by mouth At Bedtime   Refills:  0       MELATONIN PO        Dose:  5 mg   Take 5 mg by mouth At Bedtime   Refills:  0       montelukast 10 MG tablet   Commonly known as:  SINGULAIR   Used for:  Bronchiolitis        Dose:  10 mg   Take 1 tablet (10 mg) by mouth At Bedtime   Quantity:  30 tablet   Refills:  11       pimecrolimus 1 % cream   Commonly known as:  ELIDEL        Apply topically daily as needed   Refills:  0       PREDNISONE PO        Dose:  3 mg   Take 3 mg by mouth daily   Refills:  0       rosuvastatin 5 MG tablet   Commonly known as:  CRESTOR   Used for:  ILD (interstitial lung disease) (H), Bronchiolitis        Dose:  5 mg   Take 5 mg by mouth every other day   Refills:  0       VITAMIN C PO        Dose:  500 mg   Take 500 mg by mouth every morning   Refills:  0       VITAMIN D (CHOLECALCIFEROL) PO         Dose:  1000 Units   Take 1,000 Units by mouth 2 times daily   Refills:  0         STOP taking     ASPIRIN PO                Where to get your medicines      These medications were sent to [facility] [place], [place] - [address]  [address] [place] [address]     Phone:  [phone]     enoxaparin 40 MG/0.4ML injection    senna-docusate 8.6-50 MG per tablet         Some of these will need a paper prescription and others can be bought over the counter. Ask your nurse if you have questions.     Bring a paper prescription for each of these medications     order for DME    oxyCODONE IR 5 MG tablet                Protect others around you: Learn how to safely use, store and throw away your medicines at www.disposemymeds.org.        ANTIBIOTIC INSTRUCTION     You've Been Prescribed an Antibiotic - Now What?  Your healthcare team thinks that you or your loved one might have an infection. Some infections can be treated with antibiotics, which are powerful, life-saving drugs. Like all medications, antibiotics have side effects and should only be used when necessary. There are some important things you should know about your antibiotic treatment.      Your healthcare team may run tests before you start taking an antibiotic.    Your team may take samples (e.g., from your blood, urine or other areas) to run tests to look for bacteria. These test can be important to determine if you need an antibiotic at all and, if you do, which antibiotic will work best.      Within a few days, your healthcare team might change or even stop your antibiotic.    Your team may start you on an antibiotic while they are working to find out what is making you sick.    Your team might change your antibiotic because test results show that a different antibiotic would be better to treat your infection.    In some cases, once your team has more information, they learn that you do not need an antibiotic at all. They may  find out that you don't have an infection, or that the antibiotic you're taking won't work against your infection. For example, an infection caused by a virus can't be treated with antibiotics. Staying on an antibiotic when you don't need it is more likely to be harmful than helpful.      You may experience side effects from your antibiotic.    Like all medications, antibiotics have side effects. Some of these can be serious.    Let you healthcare team know if you have any known allergies when you are admitted to the hospital.    One significant side effect of nearly all antibiotics is the risk of severe and sometimes deadly diarrhea caused by Clostridium difficile (C. Difficile). This occurs when a person takes antibiotics because some good germs are destroyed. Antibiotic use allows C. diificile to take over, putting patients at high risk for this serious infection.    As a patient or caregiver, it is important to understand your or your loved one's antibiotic treatment. It is especially important for caregivers to speak up when patients can't speak for themselves. Here are some important questions to ask your healthcare team.    What infection is this antibiotic treating and how do you know I have that infection?    What side effects might occur from this antibiotic?    How long will I need to take this antibiotic?    Is it safe to take this antibiotic with other medications or supplements (e.g., vitamins) that I am taking?     Are there any special directions I need to know about taking this antibiotic? For example, should I take it with food?    How will I be monitored to know whether my infection is responding to the antibiotic?    What tests may help to make sure the right antibiotic is prescribed for me?      Information provided by:  www.cdc.gov/getsmart  U.S. Department of Health and Human Services  Centers for disease Control and Prevention  National Center for Emerging and Zoonotic Infectious  Diseases  Division of Healthcare Quality Promotion        Information about OPIOIDS     PRESCRIPTION OPIOIDS: WHAT YOU NEED TO KNOW   We gave you an opioid (narcotic) pain medicine. It is important to manage your pain, but opioids are not always the best choice. You should first try all the other options your care team gave you. Take this medicine for as short a time (and as few doses) as possible.    Some activities can increase your pain, such as bandage changes or therapy sessions. It may help to take your pain medicine 30 to 60 minutes before these activities. Reduce your stress by getting enough sleep, working on hobbies you enjoy and practicing relaxation or meditation. Talk to your care team about ways to manage your pain beyond prescription opioids.    These medicines have risks:    DO NOT drive when on new or higher doses of pain medicine. These medicines can affect your alertness and reaction times, and you could be arrested for driving under the influence (DUI). If you need to use opioids long-term, talk to your care team about driving.    DO NOT operate heavy machinery    DO NOT do any other dangerous activities while taking these medicines.    DO NOT drink any alcohol while taking these medicines.     If the opioid prescribed includes acetaminophen, DO NOT take with any other medicines that contain acetaminophen. Read all labels carefully. Look for the word  acetaminophen  or  Tylenol.  Ask your pharmacist if you have questions or are unsure.    You can get addicted to pain medicines, especially if you have a history of addiction (chemical, alcohol or substance dependence). Talk to your care team about ways to reduce this risk.    All opioids tend to cause constipation. Drink plenty of water and eat foods that have a lot of fiber, such as fruits, vegetables, prune juice, apple juice and high-fiber cereal. Take a laxative (Miralax, milk of magnesia, Colace, Senna) if you don t move your bowels at least  every other day. Other side effects include upset stomach, sleepiness, dizziness, throwing up, tolerance (needing more of the medicine to have the same effect), physical dependence and slowed breathing.    Store your pills in a secure place, locked if possible. We will not replace any lost or stolen medicine. If you don t finish your medicine, please throw away (dispose) as directed by your pharmacist. The [place] Pollution Control Agency has more information about safe disposal: https://www.pca.[facility].[place].us/living-green/managing-unwanted-medications             Medication List: This is a list of all your medications and when to take them. Check marks below indicate your daily home schedule. Keep this list as a reference.      Medications           Morning Afternoon Evening Bedtime As Needed    ACIPHEX 20 MG EC tablet   Take 20 mg by mouth every evening   Generic drug:  RABEprazole                                   ACYCLOVIR PO   Take 1,600 mg by mouth 2 times daily (4 x 400mg = 1600mg)   Last time this was given:  1,600 mg on 10/5/2018  8:03 AM                                      budesonide-formoterol 80-4.5 MCG/ACT Inhaler   Commonly known as:  SYMBICORT   Inhale 2 puffs into the lungs 2 times daily                                      calcium carbonate-vitamin D 600-200 MG-UNIT Tabs   Take 2 tablets by mouth daily                                   clotrimazole 1 % cream   Commonly known as:  LOTRIMIN   Place 1 Applicatorful vaginally every 14 days Alternate with estradiol                                enoxaparin 40 MG/0.4ML injection   Commonly known as:  LOVENOX   Inject 0.4 mLs (40 mg) Subcutaneous every 24 hours   Start taking on:  10/6/2018   Last time this was given:  40 mg on 10/5/2018  8:05 AM   Next Dose Due:  10/6/18 at 0800                                   ESTRACE VAGINAL 0.1 MG/GM cream   APPLY 1 GRAM VAGINALLY EVERY OTHER WEEK. USE SPARINGLY. ALTERNATE WITH CLOTRIMAZOLE   Generic drug:  estradiol                                 glucosamine-chondroitin 500-400 MG Caps per capsule   Take 1 capsule by mouth daily   Last time this was given:  1 capsule on 10/5/2018  8:04 AM                                   hydroxypropyl methylcellulose 0.2 % Soln ophthalmic solution   Commonly known as:  GENTEAL   Place 1 drop into both eyes At Bedtime                                LACTOBACILLUS ACID-PECTIN PO   Take 1 tablet by mouth daily                                   leflunomide 10 MG tablet   Commonly known as:  ARAVA   Take 10 mg by mouth daily                                   MAGNESIUM PO   Take 500 mg by mouth At Bedtime                                   MELATONIN PO   Take 5 mg by mouth At Bedtime   Last time this was given:  5 mg on 10/4/2018  8:50 PM                                   montelukast 10 MG tablet   Commonly known as:  SINGULAIR   Take 1 tablet (10 mg) by mouth At Bedtime   Last time this was given:  10 mg on 10/4/2018  8:50 PM                                   order for DME   Equipment being ordered: Walker Wheels () and Walker () Treatment Diagnosis: impaired gait                                oxyCODONE IR 5 MG tablet   Commonly known as:  ROXICODONE   Take 1 tablet (5 mg) by mouth every 4 hours as needed for moderate to severe pain   Last time this was given:  5 mg on 10/5/2018 10:05 AM   Next Dose Due:  Anytime after 2:05pm                                   pimecrolimus 1 % cream   Commonly known as:  ELIDEL   Apply topically daily as needed                                   PREDNISONE PO   Take 3 mg by mouth daily   Last time this was given:  3 mg on 10/5/2018  8:03 AM                                   rosuvastatin 5 MG tablet   Commonly known as:  CRESTOR   Take 5 mg by mouth every other day   Last time this was given:  5 mg on 10/5/2018  8:04 AM                                   senna-docusate 8.6-50 MG per tablet   Commonly known as:  SENOKOT-S;PERICOLACE   Take 1 tablet by mouth 2  times daily   Last time this was given:  2 tablets on 10/5/2018  8:04 AM                                      VITAMIN C PO   Take 500 mg by mouth every morning   Last time this was given:  500 mg on 10/5/2018  8:03 AM                                   VITAMIN D (CHOLECALCIFEROL) PO   Take 1,000 Units by mouth 2 times daily   Last time this was given:  1,000 Units on 10/5/2018  8:03 AM

## 2018-10-02 NOTE — OP NOTE
Procedure Date: 10/02/2018      SURGEON:  Kelsie Hardin MD      ASSISTANT:  Maris Peters PA-C was required for retraction of soft tissues, protection of neurovascular structures, visualization, and positioning of the leg.      PREOPERATIVE DIAGNOSES:   1.  Right knee endstage osteoarthritis.   2.  Right knee ligamentous instability.   3.  Right knee genu valgus, lower extremity alignment.      POSTOPERATIVE DIAGNOSES:   1.  Right knee endstage osteoarthritis.   2.  Right knee ligamentous instability.   3.  Right knee genu valgus, lower extremity alignment.      PROCEDURE:  Right total knee arthroplasty.      IMPLANTS:     1.  DePuy Sigma size 2.5 femur, with stem 5 degrees valgus.   2.  Tibia 2.5 mm plus short stem.   3.  TC3 implant 17.5 mm.   4.  A 35 mm patellar button.      INDICATIONS:  Saira is a 68-year-old woman with chronic valgus lower extremity, with laxity in both the coronal and sagittal planes.  She had a high degree of recurvatum at the knee, approximately 20 degrees, as well as subluxation of the femur top of the tibia.  She has not had significant valgus lower extremity alignment, with a hypoplastic lateral femoral condyle.  She had extensive arthritis and discussion was had regarding continued nonoperative and operative intervention, risks, benefits, alternatives and recovery.  Use of revision type component was also discussed, given the need for inherent stability in the implants, given lack of inherent stability within her ligamentous and lower extremity alignment.  After a discussion was had and her questions were answered, she elected to proceed with operative intervention.      DESCRIPTION OF PROCEDURE:  After informed consent was obtained and operative site marked, a block was administered in the preoperative holding suite by anesthesia to the adductor canal.  She was then brought to the operating room, where spinal anesthetic and sedation were administered.  Her right lower extremity  was prepped and draped in the usual sterile fashion.  A timeout taken to confirm operative site, antibiotic administration, and procedure.  Her leg was exsanguinated and the tourniquet inflated to 300 mmHg.  A straight anterior incision was made on the knee.  Sharp dissection carried out through skin and subcutaneous fat.  Medial parapatellar arthrotomy was created.  The patella was translated laterally.  At this time, the anterior horns of the menisci and fat pad were resected, allowing for visualization of the distal femur.  Here, her hypoplastic lateral femoral condyle was noted.  A femoral intramedullary drill was used to find the femoral intramedullary canal.  Here, a canal based distal femoral articular surface resecting guide was subsequently placed in and the decision was made to resect 9 mm of distal femoral articular surface, which would match the hypoplastic femoral condyle.  Here, this would remove only about 1-2 mm of bone.  After this was performed, the knee was then sized and the cutting jig was secured in place.  She measured a 2.5 and a secondary check was performed.  When it was certain this was the appropriate size anterior, posterior, and chamfer cuts, followed by PCL sacrificing box cut, was performed.  Additionally, at this time, the canal was reamed to accommodate a femoral stem.  These components were all removed and our attention was then turned to the tibial side, where correcting for coronal and sagittal alignment, as well as a little bit of tibial rotation, in her lower extremity significant valgus deformity.  The cutting jig was secured, measuring approximately 5 mm off the medial femoral condyle.  Once this cut was performed, the remainder of the menisci and cruciates were removed and this gave us good visualization of the proximal tibia.  The rotational alignment was then selected and the tibial side was punched, along with a reamer for a proposed tibial stem.  At this time, a trial  femur was placed followed by trial polyethylene implant.  Upon balancing the knee, she was found to have full extension, full flexion, and good range of motion to the knee, with stability in varus and valgus stress throughout the range of motion, with a 17.5 mm polyethylene insert.  All the trials were then removed and the patella was prepped and punched.  Irrigation was performed.  The bone ends were dried and prepared for final cementation.  Final components were cemented into place and a deep Hemovac was placed.  Irrigation with antibiotic-impregnated solution, totaling 1 liter, was used and removed.  Her arthrotomy was closed and the knee was then taken through a range of motion again check for patellar tracking.  Here, she was again found to be stable throughout her range of motion and had good patellar tracking.  The skin was closed in layers.  Sterile dressing was applied.  She was then taken to PACU in stable condition.      ESTIMATED BLOOD LOSS:  Minimal.      COMPLICATIONS:  None apparent.      POSTOPERATIVE PLAN:  She can bear weight as tolerated once the block wears off.  Ice, elevation. intermittent use of intravenous followed by oral narcotics for pain.  She will transition to nonnarcotic medications.  Additionally, she underwent one dose of antibiotic IV solution and two doses of tranexamic acid.  She will get no further doses of antibiotics, due to a history of C. difficile.  Additionally, she will continue her acyclovir antiviral dose and receive her stress dose steroids as outlined in her preoperative notes.  PT to start postoperative followup in 10-14 days, sooner if needed.         VICTORIANO HARRIS MD             D: 10/02/2018   T: 10/02/2018   MT: RAEGAN      Name:     EDGAR ZARATE   MRN:      [id number]        Account:        [id number]   :      1950           Procedure Date: 10/02/2018      Document: [id number]

## 2018-10-02 NOTE — ANESTHESIA POSTPROCEDURE EVALUATION
Patient: Saira Pyle    Procedure(s):  RIGHT TOTAL KNEE ARTHROPLASTY  - Wound Class: I-Clean    Diagnosis:DJD  Diagnosis Additional Information: No value filed.    Anesthesia Type:  Spinal, Periph. Nerve Block for postop pain    Note:  Anesthesia Post Evaluation    Patient location during evaluation: PACU  Patient participation: Able to fully participate in evaluation  Level of consciousness: awake and alert  Pain management: adequate  Airway patency: patent  Cardiovascular status: acceptable  Respiratory status: acceptable  Hydration status: acceptable  PONV: none     Anesthetic complications: None          Last vitals:  Vitals:    10/02/18 1330 10/02/18 1417 10/02/18 1430   BP: 117/65  117/63   Resp: 14 14 14   Temp:      SpO2: 96%  99%         Electronically Signed By: Kamran Go MD  October 2, 2018  3:37 PM

## 2018-10-02 NOTE — CONSULTS
"Consult Date:  10/02/2018      DATE OF SERVICE:  10/02/2018.      REASON FOR CONSULTATION:  \"Follow home meds and past medical history.\"      REQUESTING PROVIDER:  Maris Peters PA-C.      HISTORY OF PRESENT ILLNESS:  Ms. Bhaskar Pyle is a 68-year-old woman with SLE on DMARD and chronic low-dose steroids with pericardiocentesis and pericardial window in 1996 for SLE related pericardial effusion, mitral valve prolapse, TIA x 3, cataracts, esophageal diverticulosis, atrophic vaginitis, ILD/restrictive lung disease and bronchiolitis thought to be associated with her SLE, C. diff, CAD, hyperlipidemia, who has osteoarthritis, and on 10/02/2018 underwent a right total knee arthroplasty with Dr. Hardin.  Duration of surgery was 1 hour 25 minutes.  Systolic blood pressures intraoperatively ranged from .  Postoperatively, she has been  SBP.  She received intraoperative phenylephrine, 125 mg of methylprednisolone, clindamycin, tranexamic acid, 1 liter of LR and 15 mL EBL.  She received spinal and peripheral anesthesia.  She was transferred to station 55 in the postoperative state for further management.  The Hospitalist Service has been asked to see her to assist with medical comanagement in the postoperative period.      PAST MEDICAL HISTORY:   1.  Mitral valve prolapse.   2.  TIA x 3.   3.  Cataracts.   4.  CAD.   5.  DMARD and steroid dependent SLE.   6.  Hyperlipidemia.   7.  Esophageal diverticulosis.   8.  ILD.   9.  Restrictive lung disease and bronchiolitis felt to be from her SLE.   10.  C. diff.      PAST SURGICAL HISTORY:    Past Surgical History:   Procedure Laterality Date     CHOLECYSTECTOMY  04/2004     GYN SURGERY  04/2001    LEEP     GYN SURGERY  1985    removal of uterine fibroids     ORTHOPEDIC SURGERY Right     knee cartilage     ORTHOPEDIC SURGERY Left 2005    foot surgery     ORTHOPEDIC SURGERY Left     knee meniscus     ORTHOPEDIC SURGERY Left     thumb     ORTHOPEDIC SURGERY Right 2008    " "ulnar release     ORTHOPEDIC SURGERY Left 2017    ulnar release     THORACIC SURGERY      periocardiocentesis        ALLERGIES:       Allergies   Allergen Reactions     Acetaminophen Anaphylaxis     Influenza Vaccines Anaphylaxis     Rituximab Hives and Itching     Other reaction(s): Breathing Difficulty     Cephalexin Hives     Amitriptyline      PN: LW Reaction: agitation, irritability     Azithromycin Other (See Comments)     \"systemic organ failure\"     Cephalosporins Hives     Cimetidine      PN: LW Reaction: GI Upset, vomiting     Codeine      PN: LW Reaction: Vomiting     Diazepam      hyperactive     Dronabinol Other (See Comments)     Other reaction(s): Headache  Nausea and vomiting     Fluoxetine Other (See Comments)     extreme agitation, cannot be combined for use with cipro     Metronidazole      Generalized illness     Metronidazole      Other reaction(s): Other - Describe In Comment Field  Extreme pain in legs     Miconazole      vaginal burning     Morphine Sulfate (Concentrate)      nausea and vomiting     Nitrofurantoin      Multiple organ failure     Nortriptyline Other (See Comments)     hyperactivity     Omeprazole      PN: LW Reaction: GI Upset     Thimerosal Other (See Comments)     Severe buring, lupus flare     Tioconazole Other (See Comments)     Vaginal burning and bleeding     Erythromycin Rash     malaria  type reaction - fever and hair fell out     Iodine Rash     Needs to have Betadine washed off aftr surgery     Latex Rash     Other reaction(s): Other  redness     Levofloxacin Rash     PN: LW Reaction: Unknown Reaction     Quinolones Rash     happened with levaquin and Ciprofloxacin     Sulfa Drugs Rash     Tetracycline Rash       SOCIAL HISTORY:  Never smoker.  One drink per week.  Alcohol user, nondrug user.  , resides in [place], [place].      OUTPATIENT MEDICATIONS:    Prior to Admission Medications   Prescriptions Last Dose Informant Patient Reported? Taking?   ACYCLOVIR " PO 10/2/2018 at 0400 Self Yes Yes   Sig: Take 1,600 mg by mouth 2 times daily (4 x 400mg = 1600mg)   ASPIRIN PO 9/30/2018 Self Yes Yes   Sig: Take 81 mg by mouth every other day    Ascorbic Acid (VITAMIN C PO) 10/1/2018 at 0600 Self Yes Yes   Sig: Take 500 mg by mouth every morning    LACTOBACILLUS ACID-PECTIN PO 10/1/2018 at 0600 Self Yes Yes   Sig: Take 1 tablet by mouth daily   MAGNESIUM PO 10/1/2018 at 2000 Self Yes Yes   Sig: Take 500 mg by mouth At Bedtime    MELATONIN PO 10/1/2018 at 2000 Self Yes Yes   Sig: Take 5 mg by mouth At Bedtime   PREDNISONE PO 10/2/2018 at 0400 Self Yes Yes   Sig: Take 3 mg by mouth daily   RABEprazole (ACIPHEX) 20 MG EC tablet 10/1/2018 at 2000 Self Yes Yes   Sig: Take 20 mg by mouth every evening    VITAMIN D, CHOLECALCIFEROL, PO 10/1/2018 at 2000 Self Yes Yes   Sig: Take 1,000 Units by mouth 2 times daily   budesonide-formoterol (SYMBICORT) 80-4.5 MCG/ACT Inhaler 10/2/2018 at 0400 Self Yes Yes   Sig: Inhale 2 puffs into the lungs 2 times daily   calcium carbonate-vitamin D 600-200 MG-UNIT TABS 10/1/2018 at 2000 Self Yes Yes   Sig: Take 2 tablets by mouth daily    clotrimazole (LOTRIMIN) 1 % cream 9/25/2018 Self Yes No   Sig: Place 1 Applicatorful vaginally every 14 days Alternate with estradiol   estradiol (ESTRACE VAGINAL) 0.1 MG/GM cream 2 weeks ago Self Yes No   Sig: APPLY 1 GRAM VAGINALLY EVERY OTHER WEEK. USE SPARINGLY. ALTERNATE WITH CLOTRIMAZOLE   glucosamine-chondroitin 500-400 MG CAPS per capsule 9/29/2018 at am Self Yes Yes   Sig: Take 1 capsule by mouth daily   hydroxypropyl methylcellulose (GENTEAL) 0.2 % SOLN ophthalmic solution 10/1/2018 at 2000 Self Yes Yes   Sig: Place 1 drop into both eyes At Bedtime    leflunomide (ARAVA) 10 MG tablet 9/25/2018 at am Self Yes Yes   Sig: Take 10 mg by mouth daily    montelukast (SINGULAIR) 10 MG tablet 10/1/2018 at 2000 Self No Yes   Sig: Take 1 tablet (10 mg) by mouth At Bedtime   pimecrolimus (ELIDEL) 1 % cream over a month  "Self Yes No   Sig: Apply topically daily as needed    rosuvastatin (CRESTOR) 5 MG tablet 10/1/2018 at 0600 Self Yes Yes   Sig: Take 5 mg by mouth every other day       Facility-Administered Medications: None        FAMILY MEDICAL HISTORY:  Both parents  at age 78 of medical complications.      REVIEW OF SYSTEMS:     GENERAL:  The patient has been holding her aspirin 81 mg every other for 1 day prior to surgery.  She has been holding her Arava for 1 week before surgery.  She otherwise has been medication compliant.  Denies any fevers or chills.   PULMONARY:  Endorses some dyspnea on exertion with things like stairs, but no cough.  Has recent pulmonary evaluation at [facility].  Has undergone a 2-day polysomnography testing and was not prescribed any CPAP for possible CHERISE.   CARDIOVASCULAR:  Had a recent stress test in 2018 which showed a reversible perfusion abnormality in the apical and distal anterior wall consistent with ischemia.  The findings were referred to Cardiology, and she was felt to be okay to undergo the above surgery.  She follows with Cardiology from the [facility] system.   GASTROINTESTINAL:  The patient reports a good appetite but with 1-2 pound weight loss per month following an initial 15-pound weight loss associated with her C. Diff last year, most recently was 111 pounds.  She has difficulty with hemorrhoids, but no nausea, vomiting, diarrhea or constipation.  She does endorse food getting stuck in her throat because of her esophageal diverticula.  At times she will vomit, and she avoid things like beef or fibrous foods or pasta.   GENITOURINARY:  The patient is having difficulty with urinary retention requiring urinary catheterization at the start of my exam.  This has not previously been problematic.  She denies any dysuria.  She attributes the urinary retention to her spinal anesthesia.   MUSCULOSKELETAL:  Reports right lower extremity muscle \"gave out\" on 10/01/2018.  She is felt to have " joint laxities.  She has previously had difficulty with piriformis and sciatica.   INFECTIOUS DISEASES:  The patient takes chronic/ maintenance antivirals.   NEUROLOGIC:  Endorses some vertigo the last 2 days as a chronic problem.  No recent falls or syncopal episodes.      PHYSICAL EXAMINATION:   GENERAL:  Woman appears stated age, lying in bed without acute distress.   HEENT:  Head is normocephalic, atraumatic.  Pupils are 3 mm and briskly reactive bilaterally.  Sclerae nonicteric, noninjected.  Conjunctivae are pale.  Mouth has dry oral mucosa.   NECK:  Supple.   CARDIOVASCULAR:  S1, S2; no murmurs.  Blood pressure 117/62 is at her baseline.   LUNGS:  Clear to auscultation bilateral upper lobes.  Few rales left lower lobe.   ABDOMEN:  Normoactive bowel sounds, soft, nontender, nondistended.   EXTREMITIES:  Right lower extremity with a brace on and Ace wrap from ankle to thigh with a Hemovac at the proximal end of these.  Movement and sensation are preserved.   SKIN:  Warm.   NEUROLOGIC:  A capillary refill is brisk as seen.  Face symmetric.  Tongue is midline.  Speech is fluent.  Follows commands.  Showed 2 fingers and wiggle feet bilaterally.   SKIN:  No edema.  Quite pale.  She says it is at baseline.      IMPRESSION:  Ms. Bhaskar Pyle is a 68-year-old woman with past medical history of SLE on DMARD and chronic low-dose steroids with pericardiocentesis and pericardial window for SLE related pericardial effusion, mitral valve prolapse, TIA x 3, cataracts, esophageal diverticulosis, atrophic vaginitis, ILD/restrictive lung disease and bronchiolitis thought to be associated with her SLE, C. diff, CAD, hyperlipidemia, who has osteoarthritis,who is status post right total knee arthroplasty on 10/02/2018 with Dr. Hardin.  The Hospitalist Service has been asked to see her in consultation for assistance with management of her medical comorbid conditions in the postoperative period.      PROBLEM LIST AND PLAN:   Right  total knee arthroplasty on 10/02/2018 with Dr. Hardin.    -- Defer to the primary surgical service:  Analgesia, mobility, deep venous thrombosis prophylaxis, therapies, etc.   -- The patient is opioid naive.  Has many drug sensitivities and intolerances.  Recommend starting with low-dose opioids if needed for analgesia.     Systemic lupus erythematosus on chronic DMARDs and steroids.   -- The patient is planning to hold her Arava for 2 weeks postoperatively.  We will discontinue from her MAR.   -- The patient received 1 dose of stress dose steroids in the perioperative period, and she is now back on her home dose of 3 mg of prednisone daily.   -- Given the low dose of her prednisone, this is unlikely to be an HPA suppressing dose, and so unless she shows signs of adrenal insufficiency, will hold off on any further stress dose steroids for now.     Mitral valve prolapse.   History of pericardial effusion requiring pericardial window.   Coronary artery disease by Abnormal stress test from 7/2018 .   Hyperlipidemia.   -- Recommend resuming the patient's aspirin when safe to do so from a postsurgical standpoint.   -- Continue PTA statin.   -- Continue to monitor blood pressures closely, which could potentially suggest adrenal insufficiency.   --recommend outpt cardiology follow up for further ischemic evaluation    Restrictive lung disease/interstitial lung disease/bronchiolitis, likely systemic lupus erythematosus related.   -- Continue PTA dose of Symbicort and Singulair.   -- Continuous SpO2 monitoring.   -- Incentive spirometry instruction was provided.   --continue post op capnography monitoring  --as above, use lowest effective dose of analgesics as there is likely potential for hypoventilation/hypercarbia w/ her chronic lung disease.    Urinary retention, likely related to her spinal anesthesia.  She has undergone urinary catheterization by nursing.   -- Remove catheter as soon as possible and continue to bladder  scan and check postvoid residuals as needed and repeat intermittent catheterization if over 300 mL of urine.     History of C. diff status post clindamycin in the perioperative period.  No further antimicrobials are scheduled as per Surgery.   -- Agree with scheduled bowel regimen, but monitor for any diarrheal or abdominal pain symptoms.     History of transient ischemic attack.   -- As above recommend resuming every other day aspirin when able.     Prophylaxis:  The patient is to start enoxaparin on 10/03/2018 in addition to bilateral pedal PCDs.     Esophageal diverticulosis.   -- Continue PTA rabeprazole.     Weight loss  --recommend PCP and/or rheumatology follow up    FULL CODE STATUS NOTED.     Adequate peripheral IV access with peripheral IV catheter.      We thank you for this interesting consult.  The Hospitalist Service will follow the patient again on 10/03/2018.        The patient will be independently evaluated by Dr. Tomás Escobar.     Total time was 62 minutes from 125-149, 255-323 of which 25 minutes was face to face time remainder spent in counseling and coordination of care.      As dictated by ZIGGY RHODES, CNP      TOMÁS ESCOBAR MD                  D: 10/02/2018   T: 10/02/2018   MT: SH      Name:     EDGAR ZARATE   MRN:      [id number]        Account:       [id number]   :      1950           Consult Date:  10/02/2018      Document: [id number]       cc: LEON Olivares MD

## 2018-10-02 NOTE — ANESTHESIA PROCEDURE NOTES
Peripheral nerve/Neuraxial procedure note : adductor canal and femoral  Pre-Procedure  Performed by VEENA MAIER  Location: pre-op      Pre-Anesthestic Checklist: patient identified, IV checked, site marked, risks and benefits discussed, informed consent, monitors and equipment checked, pre-op evaluation, at physician/surgeon's request and post-op pain management    Timeout  Correct Patient: Yes   Correct Procedure: Yes   Correct Site: Yes   Correct Laterality: Yes   Correct Position: Yes   Site Marked: Yes   .   Procedure Documentation    .    Procedure:  right  Adductor canal and femoral.  Local skin infiltrated with mL of 1% lidocaine.     Ultrasound used to identify targeted nerve, plexus, or vascular marker and placed a needle adjacent to it., Ultrasound was used to visualize the spread of the anesthetic in close proximity to the above stated nerve. A permanent image is entered into the patient's record.  Patient Prep;mask, sterile gloves, chlorhexidine gluconate and isopropyl alcohol, patient draped.  .  Needle: insulated, short bevel Needle Gauge: 21.  Needle Length (millimeters) 100  Insertion Method: Single Shot.       Assessment/Narrative  Paresthesias: No.  .  The placement was negative for: blood aspirated, painful injection and site bleeding.  Bolus given via needle..   Secured via.   Complications: none. Comments:  Patient's femoral nerve located in the adductor canal using ultrasound. 1% lidocaine infiltrated sub q. An insulated nerve stimulating needle was used to approach the nerve. Negative aspiration applied prior to injection and every 5 ml subsequently injected. 0.5% Bupivicaine with 1:400,000 epinephrine was used as the injectate. I actively visualized the injection of local around the femoral nerve.  No complications.

## 2018-10-02 NOTE — ANESTHESIA PROCEDURE NOTES
Peripheral nerve/Neuraxial procedure note : intrathecal  Pre-Procedure  Performed by VEENA MAIER  Location: OR      Pre-Anesthestic Checklist: patient identified, IV checked, risks and benefits discussed, informed consent, monitors and equipment checked and pre-op evaluation    Timeout  Correct Patient: Yes   Correct Procedure: Yes   Correct Site: Yes   Correct Laterality: N/A   Correct Position: Yes   Site Marked: N/A   .   Procedure Documentation    .    Procedure:    Intrathecal.  Insertion Site:L3-4  (midline approach)      Patient Prep;mask, sterile gloves, povidone-iodine 7.5% surgical scrub, patient draped.  .  Needle: Chelle-Samir Spinal Needle (gauge): 24  Spinal/LP Needle Length (inches): 3.5 # of attempts: 1 and # of redirects:  Introducer used .       Assessment/Narrative  Paresthesias: No.  .  .  clear CSF fluid removed . Comments:  No pain with injection, questions answered about procedure.

## 2018-10-03 ENCOUNTER — APPOINTMENT (OUTPATIENT)
Dept: PHYSICAL THERAPY | Facility: CLINIC | Age: 68
DRG: 470 | End: 2018-10-03
Attending: ORTHOPAEDIC SURGERY
Payer: COMMERCIAL

## 2018-10-03 ENCOUNTER — APPOINTMENT (OUTPATIENT)
Dept: OCCUPATIONAL THERAPY | Facility: CLINIC | Age: 68
DRG: 470 | End: 2018-10-03
Attending: PHYSICIAN ASSISTANT
Payer: COMMERCIAL

## 2018-10-03 LAB
GLUCOSE SERPL-MCNC: 102 MG/DL (ref 70–99)
HGB BLD-MCNC: 10.6 G/DL (ref 11.7–15.7)

## 2018-10-03 PROCEDURE — 99232 SBSQ HOSP IP/OBS MODERATE 35: CPT | Performed by: HOSPITALIST

## 2018-10-03 PROCEDURE — A9270 NON-COVERED ITEM OR SERVICE: HCPCS | Mod: GY | Performed by: PHYSICIAN ASSISTANT

## 2018-10-03 PROCEDURE — 25000128 H RX IP 250 OP 636: Performed by: PHYSICIAN ASSISTANT

## 2018-10-03 PROCEDURE — 25000132 ZZH RX MED GY IP 250 OP 250 PS 637: Mod: GY | Performed by: NURSE PRACTITIONER

## 2018-10-03 PROCEDURE — 97535 SELF CARE MNGMENT TRAINING: CPT | Mod: GO

## 2018-10-03 PROCEDURE — 40000133 ZZH STATISTIC OT WARD VISIT

## 2018-10-03 PROCEDURE — 97110 THERAPEUTIC EXERCISES: CPT | Mod: GP | Performed by: PHYSICAL THERAPIST

## 2018-10-03 PROCEDURE — 25000132 ZZH RX MED GY IP 250 OP 250 PS 637: Performed by: PHYSICIAN ASSISTANT

## 2018-10-03 PROCEDURE — 82947 ASSAY GLUCOSE BLOOD QUANT: CPT | Performed by: PHYSICIAN ASSISTANT

## 2018-10-03 PROCEDURE — A9270 NON-COVERED ITEM OR SERVICE: HCPCS | Mod: GY | Performed by: ORTHOPAEDIC SURGERY

## 2018-10-03 PROCEDURE — 85018 HEMOGLOBIN: CPT | Performed by: PHYSICIAN ASSISTANT

## 2018-10-03 PROCEDURE — 25000132 ZZH RX MED GY IP 250 OP 250 PS 637: Performed by: ORTHOPAEDIC SURGERY

## 2018-10-03 PROCEDURE — 40000193 ZZH STATISTIC PT WARD VISIT: Performed by: PHYSICAL THERAPIST

## 2018-10-03 PROCEDURE — 99207 ZZC CDG-MDM COMPONENT: MEETS MODERATE - UP CODED: CPT | Performed by: HOSPITALIST

## 2018-10-03 PROCEDURE — 97165 OT EVAL LOW COMPLEX 30 MIN: CPT | Mod: GO

## 2018-10-03 PROCEDURE — 36415 COLL VENOUS BLD VENIPUNCTURE: CPT | Performed by: PHYSICIAN ASSISTANT

## 2018-10-03 PROCEDURE — 97530 THERAPEUTIC ACTIVITIES: CPT | Mod: GP | Performed by: PHYSICAL THERAPIST

## 2018-10-03 PROCEDURE — 12000007 ZZH R&B INTERMEDIATE

## 2018-10-03 PROCEDURE — 40000193 ZZH STATISTIC PT WARD VISIT

## 2018-10-03 PROCEDURE — 97116 GAIT TRAINING THERAPY: CPT | Mod: GP

## 2018-10-03 PROCEDURE — A9270 NON-COVERED ITEM OR SERVICE: HCPCS | Mod: GY | Performed by: NURSE PRACTITIONER

## 2018-10-03 PROCEDURE — 97110 THERAPEUTIC EXERCISES: CPT | Mod: GP

## 2018-10-03 RX ORDER — METOCLOPRAMIDE 5 MG/1
5 TABLET ORAL EVERY 6 HOURS PRN
Status: DISCONTINUED | OUTPATIENT
Start: 2018-10-03 | End: 2018-10-05 | Stop reason: HOSPADM

## 2018-10-03 RX ORDER — HYDROMORPHONE HYDROCHLORIDE 2 MG/1
2-4 TABLET ORAL
Status: DISCONTINUED | OUTPATIENT
Start: 2018-10-03 | End: 2018-10-04

## 2018-10-03 RX ORDER — METOCLOPRAMIDE HYDROCHLORIDE 5 MG/ML
5 INJECTION INTRAMUSCULAR; INTRAVENOUS EVERY 6 HOURS PRN
Status: DISCONTINUED | OUTPATIENT
Start: 2018-10-03 | End: 2018-10-05 | Stop reason: HOSPADM

## 2018-10-03 RX ORDER — ONDANSETRON 2 MG/ML
4 INJECTION INTRAMUSCULAR; INTRAVENOUS EVERY 6 HOURS PRN
Status: DISCONTINUED | OUTPATIENT
Start: 2018-10-03 | End: 2018-10-05 | Stop reason: HOSPADM

## 2018-10-03 RX ORDER — PROCHLORPERAZINE MALEATE 5 MG
5 TABLET ORAL EVERY 6 HOURS PRN
Status: DISCONTINUED | OUTPATIENT
Start: 2018-10-03 | End: 2018-10-04

## 2018-10-03 RX ORDER — HYDROMORPHONE HYDROCHLORIDE 2 MG/1
2-4 TABLET ORAL EVERY 4 HOURS PRN
Status: DISCONTINUED | OUTPATIENT
Start: 2018-10-03 | End: 2018-10-03 | Stop reason: DRUGHIGH

## 2018-10-03 RX ORDER — PROCHLORPERAZINE 25 MG
12.5 SUPPOSITORY, RECTAL RECTAL EVERY 12 HOURS PRN
Status: DISCONTINUED | OUTPATIENT
Start: 2018-10-03 | End: 2018-10-04

## 2018-10-03 RX ORDER — HYDROXYZINE HYDROCHLORIDE 25 MG/1
50 TABLET, FILM COATED ORAL EVERY 6 HOURS PRN
Status: DISCONTINUED | OUTPATIENT
Start: 2018-10-03 | End: 2018-10-05 | Stop reason: HOSPADM

## 2018-10-03 RX ORDER — ONDANSETRON 4 MG/1
4 TABLET, ORALLY DISINTEGRATING ORAL EVERY 6 HOURS PRN
Status: DISCONTINUED | OUTPATIENT
Start: 2018-10-03 | End: 2018-10-05 | Stop reason: HOSPADM

## 2018-10-03 RX ADMIN — FLUTICASONE FUROATE AND VILANTEROL TRIFENATATE 1 PUFF: 100; 25 POWDER RESPIRATORY (INHALATION) at 08:00

## 2018-10-03 RX ADMIN — VITAMIN D, TAB 1000IU (100/BT) 1000 UNITS: 25 TAB at 20:41

## 2018-10-03 RX ADMIN — OXYCODONE HYDROCHLORIDE 5 MG: 5 TABLET ORAL at 23:11

## 2018-10-03 RX ADMIN — ACYCLOVIR 1600 MG: 800 TABLET ORAL at 20:41

## 2018-10-03 RX ADMIN — PROCHLORPERAZINE EDISYLATE 5 MG: 5 INJECTION INTRAMUSCULAR; INTRAVENOUS at 09:41

## 2018-10-03 RX ADMIN — DEXTRAN 70 AND HYPROMELLOSE 2910 1 DROP: 1; 3 SOLUTION/ DROPS OPHTHALMIC at 20:43

## 2018-10-03 RX ADMIN — MAGNESIUM 64 MG (MAGNESIUM CHLORIDE) TABLET,DELAYED RELEASE 535 MG: at 20:41

## 2018-10-03 RX ADMIN — ONDANSETRON 4 MG: 2 INJECTION INTRAMUSCULAR; INTRAVENOUS at 07:36

## 2018-10-03 RX ADMIN — SENNOSIDES AND DOCUSATE SODIUM 1 TABLET: 8.6; 5 TABLET ORAL at 20:41

## 2018-10-03 RX ADMIN — MONTELUKAST SODIUM 10 MG: 10 TABLET, FILM COATED ORAL at 20:41

## 2018-10-03 RX ADMIN — ENOXAPARIN SODIUM 40 MG: 40 INJECTION SUBCUTANEOUS at 07:36

## 2018-10-03 RX ADMIN — OXYCODONE HYDROCHLORIDE 10 MG: 5 TABLET ORAL at 07:58

## 2018-10-03 RX ADMIN — PANTOPRAZOLE SODIUM 40 MG: 40 TABLET, DELAYED RELEASE ORAL at 20:41

## 2018-10-03 RX ADMIN — OXYCODONE HYDROCHLORIDE 10 MG: 5 TABLET ORAL at 04:03

## 2018-10-03 ASSESSMENT — ACTIVITIES OF DAILY LIVING (ADL)
ADLS_ACUITY_SCORE: 10
ADLS_ACUITY_SCORE: 9
ADLS_ACUITY_SCORE: 10
ADLS_ACUITY_SCORE: 9
PREVIOUS_RESPONSIBILITIES: MEAL PREP;HOUSEKEEPING;LAUNDRY;SHOPPING;MEDICATION MANAGEMENT;DRIVING
ADLS_ACUITY_SCORE: 9
ADLS_ACUITY_SCORE: 9

## 2018-10-03 NOTE — PROGRESS NOTES
10/03/18 1333   Quick Adds   Type of Visit Initial Occupational Therapy Evaluation   Living Environment   Lives With spouse   Living Arrangements house   Home Accessibility bed and bath on same level;tub/shower is not walk in   Number of Stairs to Enter Home 2   Number of Stairs Within Home 1   Transportation Available car;family or friend will provide   Living Environment Comment Has tub chair, high rise toilet    Functional Level Prior   Ambulation 0-->independent   Transferring 0-->independent   Toileting 0-->independent   Bathing 0-->independent   Dressing 0-->independent   Eating 0-->independent   Communication 0-->understands/communicates without difficulty   Swallowing 0-->swallows foods/liquids without difficulty   Cognition 0 - no cognition issues reported   Fall history within last six months no   General Information   Onset of Illness/Injury or Date of Surgery - Date 10/02/18   Referring Physician Heikes   Patient/Family Goals Statement return home   Additional Occupational Profile Info/Pertinent History of Current Problem R TKA   Precautions/Limitations fall precautions   Weight-Bearing Status - RLE weight-bearing as tolerated   Cognitive Status Examination   Orientation orientation to person, place and time   Level of Consciousness lethargic/somnolent   Able to Follow Commands WNL/WFL   Personal Safety (Cognitive) WNL/WFL   Memory intact   Cognitive Comment pt very lethargic this PM. Unable to keep eyes open without cues   Visual Perception   Visual Perception Wears glasses   Sensory Examination   Sensory Quick Adds No deficits were identified   Pain Assessment   Patient Currently in Pain Yes, see Vital Sign flowsheet  (3/10)   Integumentary/Edema   Integumentary/Edema no deficits were identifed   Posture   Posture not impaired   Range of Motion (ROM)   ROM Quick Adds No deficits were identified   Strength   Manual Muscle Testing Quick Adds No deficits were identified   Muscle Tone Assessment   Muscle  Tone Quick Adds No deficits were identified   Coordination   Upper Extremity Coordination No deficits were identified   Mobility   Bed Mobility Bed mobility skill: Sit to supine;Bed mobility skill: Supine to sit   Bed Mobility Skill: Sit to Supine   Level of [place]: Sit/Supine contact guard   Bed Mobility Skill: Supine to Sit   Level of [place]: Supine/Sit stand-by assist   Transfer Skill: Sit to Stand   Level of [place]: Sit/Stand stand-by assist   Transfer Skill: Sit to Stand weight-bearing as tolerated   Assistive Device for Transfer: Sit/Stand standard walker   Transfer Skill: Toilet Transfer   Level of [place]: Toilet contact guard   Weight-Bearing Restrictions: Toilet weight-bearing as tolerated   Assistive Device standard walker;grab bars;seat riser   Lower Body Dressing   Level of [place]: Dress Lower Body maximum assist (25% patients effort)   Grooming   Level of [place]: Grooming contact guard   Eating/Self Feeding   Level of [place]: Eating independent   Instrumental Activities of Daily Living (IADL)   Previous Responsibilities meal prep;housekeeping;laundry;shopping;medication management;driving   Activities of Daily Living Analysis   Impairments Contributing to Impaired Activities of Daily Living balance impaired;pain   General Therapy Interventions   Planned Therapy Interventions ADL retraining;transfer training   Clinical Impression   Criteria for Skilled Therapeutic Interventions Met yes, treatment indicated   OT Diagnosis decreased I with ADL/IADL's and transfers   Influenced by the following impairments pain, dec ROM   Assessment of Occupational Performance 1-3 Performance Deficits   Identified Performance Deficits LE dressing, toileting, toilet transfer, tub transfer   Clinical Decision Making (Complexity) Low complexity   Therapy Frequency daily   Predicted Duration of Therapy Intervention (days/wks) 2 days   Anticipated Discharge Disposition Home with Assist  "  Risks and Benefits of Treatment have been explained. Yes   Patient, Family & other staff in agreement with plan of care Yes   [facility] TM \"6 Clicks\"   2016, Trustees of [facility], under license to [employer].  All rights reserved.   6 Clicks Short Forms Daily Activity Inpatient Short Form   [facility] AM-PAC  \"6 Clicks\" Daily Activity Inpatient Short Form   1. Putting on and taking off regular lower body clothing? 3 - A Little   2. Bathing (including washing, rinsing, drying)? 3 - A Little   3. Toileting, which includes using toilet, bedpan or urinal? 3 - A Little   4. Putting on and taking off regular upper body clothing? 4 - None   5. Taking care of personal grooming such as brushing teeth? 4 - None   6. Eating meals? 4 - None   Daily Activity Raw Score (Score out of 24.Lower scores equate to lower levels of function) 21   Total Evaluation Time   Total Evaluation Time (Minutes) 8     "

## 2018-10-03 NOTE — PROGRESS NOTES
[facility]    Hospitalist Progress Note      Assessment & Plan   Saira Pyle is a 68 year old female who was admitted on 10/2/2018.  Past medical history of SLE, TIA x 3, CAD who is status post right total knee arthroplasty on 10/02/2018 with Dr. Hardin.  The Hospitalist Service has been asked to see her in consultation for assistance with management of her medical comorbid conditions in the postoperative period.         Right total knee arthroplasty 10/02/2018  Uncomplicated procedure with Dr. Hardin.    - post-op management per ortho  - lethargic today; possibly due to narcotics - would minimize and monitor status     SLE  Maintained on Arava and chronic steroids.  Hx of pericardiocentesis and pericardial window for SLE related pericardial effusion in addition to ILD/restrictive lung disease and bronchiolitis thought to be associated with her SLE  - per outpatient rheumatologist, hold Arava for 2 weeks postoperatively  - continue PTA prednisone     CAD  Hyperlipidemia  CAD diagnosed by abnormal stress test 7/2018.  Cardiology recommends no angiography unless develops new chest pain concerning for typical angina.   - resume aspirin once safe from surgical perspective   - Continue PTA statin     ILD and bronchiolitis  Likely SLE related.  Follows with Dr. Valentin at [facility].   - continue PTA Breo and Singulair     History of C. diff   Received clindamycin in the perioperative period.   - monitor for diarrhea      History of transient ischemic attack.   - resume every other day aspirin when able.      Esophageal diverticulosis.   - continue PTA rabeprazole.      DVT Prophylaxis: Defer to primary service  Code Status: Full Code    Disposition: Expected discharge per ortho.    Tomás Escobar    Interval History   Feeling sleepy and lethargic, concerned about her ability to go home tomorrow.  Denies any fever/chills, lightheadedness, dyspnea, dysuria, or other infectious complaints.    -Data reviewed  today: I reviewed all new labs and imaging results over the last 24 hours. I personally reviewed no images or EKG's today.    Physical Exam   Temp: 98.4  F (36.9  C) Temp src: Oral BP: 103/57 Pulse: 80 Heart Rate: 74 Resp: 16 SpO2: 96 % O2 Device: None (Room air) Oxygen Delivery: 2 LPM  Vitals:    10/02/18 0807   Weight: 51.7 kg (114 lb)     Vital Signs with Ranges  Temp:  [97.9  F (36.6  C)-98.4  F (36.9  C)] 98.4  F (36.9  C)  Pulse:  [63-80] 80  Heart Rate:  [62-74] 74  Resp:  [14-20] 16  BP: (103-122)/(57-71) 103/57  SpO2:  [94 %-99 %] 96 %  I/O last 3 completed shifts:  In: 2892 [P.O.:550; I.V.:2342]  Out: 990 [Urine:750; Drains:225; Blood:15]    Constitutional: Well developed, well nourished female in no acute distress, appearing fatigued  Respiratory: Fine inspiratory bibasilar crackles, no wheezing or tachypnea  Cardiovascular: regular rate and rhythm, normal S1/S2 without murmur, rubs or gallops  GI: abdomen soft, non-tender, non-distended, normal bowel sounds  Other:  sleepy but appropriate, cranial nerves grossly intact    Medications     lactated ringers 75 mL/hr at 10/02/18 1419     sodium chloride         acyclovir  1,600 mg Oral BID     ascorbic acid (VITAMIN C) tablet 500 mg  500 mg Oral QAM     calcium carbonate 600 mg-vitamin D 400 units  2 tablet Oral Daily     cholecalciferol  1,000 Units Oral BID     [START ON 10/9/2018] clotrimazole  1 Applicatorful Vaginal Q14 Days     enoxaparin  40 mg Subcutaneous Q24H     fluticasone-vilanterol  1 puff Inhalation Daily     glucosamine-chondroitin  1 capsule Oral Daily     hypromellose-dextran  1 drop Both Eyes At Bedtime     magnesium chloride  535 mg Oral At Bedtime     melatonin tablet 5 mg  5 mg Oral At Bedtime     montelukast  10 mg Oral At Bedtime     pantoprazole  40 mg Oral QPM     predniSONE (DELTASONE) half-tab 3 mg  3 mg Oral Daily     rosuvastatin  5 mg Oral Every Other Day     senna-docusate  1 tablet Oral BID    Or     senna-docusate  2 tablet  Oral BID     sodium chloride (PF)  3 mL Intracatheter Q8H       Data     Recent Labs  Lab 10/03/18  0700 10/02/18  1510   HGB 10.6*  --    PLT  --  204   CR  --  0.56   *  --        No results found for this or any previous visit (from the past 24 hour(s)).

## 2018-10-03 NOTE — PLAN OF CARE
Problem: Patient Care Overview  Goal: Plan of Care/Patient Progress Review  Outcome: Improving  Pt A&O. CMS intact. VSS. Up w/ 1 and walker to bathroom. Taking oxy for pain. Voiding adequtely. Hemovac in place. Continue to monitor.

## 2018-10-03 NOTE — PLAN OF CARE
Problem: Patient Care Overview  Goal: Plan of Care/Patient Progress Review  Discharge Planner PT   Patient plan for discharge: home with spouse   Current status: order received, chart reviewed, eval completed, tx initiated. Pt seen POD 1 s/p R TKA. Pt lives in house with spouse 2 steps to enter. Bed/bath on same level. Pt reports I with all ADL/IADL's prior including driving. Pt reports spouse will be home for two weeks to A as needed. Pt very nauseous upon start of session but willing to participate. CGA for bed mobility supine <> sit due to increased pain. OT educated/demonstrated use of AE for LE dressing. Pt receptive and able to don/doff socks and pants with use of AE and verbal cues for technique. Pt has dressing AE at home from friend. CGA and FWW for toilet transfer with use of grab bars and high rise toilet. CGA and FWW for grooming at sink. One LOB requiring min A for balance recovery. Pt fatigued and nauseous at end of session.    Barriers to return to prior living situation: none anticipated   Recommendations for discharge: Home with A from spouse with ADL/IADL's as needed   Rationale for recommendations: Anticipate pt will continue to make progress with therapies as pain and nausea improves. Pt reports spouse will be home to A with all tasks as needed. Pt should be safe to return home with 1-2 more sessions of OT to address functional transfers (tub and toilet).        Entered by: Kim Beck 10/03/2018 2:05 PM

## 2018-10-03 NOTE — PLAN OF CARE
Problem: Patient Care Overview  Goal: Plan of Care/Patient Progress Review  Discharge Planner PT   Patient plan for discharge: home with OP PT  Current status: Pt very lethargic this AM, difficulty keeping her eyes open during session. C/O muscle spasms in knee that have gotten slightly better but she was very nauseous and have trouble with her GERD. Pt responded to questions but kept eyes closed most of session and didn't tolerate OOB activity as she felt like she was going to get sick. Bed mobility independent. ROM 8-93.    Barriers to return to prior living situation: stairs  Recommendations for discharge: home with assist of spouse and OP PT  Rationale for recommendations: Anticipate that as symptoms are better managed she will continue to mobilize with little assistance safely        Entered by: Erica Rios 10/03/2018 11:33 AM

## 2018-10-03 NOTE — PROGRESS NOTES
"  ORTHO pod #1      S: Comfortable. Pain well controlled. Drowsy. ( - )nausea/vomiting  O:  Dressing c/d/i. No calf tenderness.     Hemoglobin   Date Value Ref Range Status   10/03/2018 10.6 (L) 11.7 - 15.7 g/dL Final   ]    /57 (BP Location: Right arm)  Pulse 80  Temp 98.4  F (36.9  C) (Oral)  Resp 16  Ht 1.549 m (5' 1\")  Wt 51.7 kg (114 lb)  SpO2 96%  BMI 21.54 kg/m2      A/P:  s/p TKA right pod #1  1. DC Hvac on POD #2, Dressing change on POD #2  2.  Continue current pain mgmt-(ordered dilaudid- to be used only if patient continues to be drowsy)  3.  Plan DC on POD #3-home  4. lovenox for DVT prophylaxis  5. WBAT      Maris Peters  10/03/2018  3:07 PM    "

## 2018-10-03 NOTE — PLAN OF CARE
Problem: Patient Care Overview  Goal: Plan of Care/Patient Progress Review  Outcome: Improving  Pt remains A/O feeling sleepy today. Up with A1 walker GB. IvSL. Oxycodone for pain. Nausea this AM zofran and compazine given x1-effective. Continue to monitor.

## 2018-10-03 NOTE — PLAN OF CARE
Problem: Patient Care Overview  Goal: Plan of Care/Patient Progress Review  Discharge Planner PT   Patient plan for discharge: home with OP PT  Current status: Pt continues to be lethargic, however able to participate in PT. 5/10 pain level in right knee. Participated in right TKA ex, Able to perform gait training with min A, WC follow and cues for walker/foot sequencing.  No knee buckling or LOB. Amb 25ft x 2 with seated rest break due to mild lightheadedness and fatigue. Min A for right leg with bed mobility, min A with FWW for sit to stand transfers.   Barriers to return to prior living situation: stairs  Recommendations for discharge: home with assist of spouse and OP PT  Rationale for recommendations: Anticipate that as symptoms are better managed she will continue to mobilize with little assistance safely        Entered by: Kelsie Davey 10/03/2018 3:30 PM

## 2018-10-04 ENCOUNTER — APPOINTMENT (OUTPATIENT)
Dept: PHYSICAL THERAPY | Facility: CLINIC | Age: 68
DRG: 470 | End: 2018-10-04
Attending: ORTHOPAEDIC SURGERY
Payer: COMMERCIAL

## 2018-10-04 ENCOUNTER — APPOINTMENT (OUTPATIENT)
Dept: OCCUPATIONAL THERAPY | Facility: CLINIC | Age: 68
DRG: 470 | End: 2018-10-04
Attending: ORTHOPAEDIC SURGERY
Payer: COMMERCIAL

## 2018-10-04 LAB — HGB BLD-MCNC: 10.7 G/DL (ref 11.7–15.7)

## 2018-10-04 PROCEDURE — 25000131 ZZH RX MED GY IP 250 OP 636 PS 637: Mod: GY | Performed by: PHYSICIAN ASSISTANT

## 2018-10-04 PROCEDURE — 40000133 ZZH STATISTIC OT WARD VISIT: Performed by: OCCUPATIONAL THERAPY ASSISTANT

## 2018-10-04 PROCEDURE — 97530 THERAPEUTIC ACTIVITIES: CPT | Mod: GP

## 2018-10-04 PROCEDURE — A9270 NON-COVERED ITEM OR SERVICE: HCPCS | Mod: GY | Performed by: PHYSICIAN ASSISTANT

## 2018-10-04 PROCEDURE — 97535 SELF CARE MNGMENT TRAINING: CPT | Mod: GO | Performed by: OCCUPATIONAL THERAPY ASSISTANT

## 2018-10-04 PROCEDURE — 36415 COLL VENOUS BLD VENIPUNCTURE: CPT | Performed by: PHYSICIAN ASSISTANT

## 2018-10-04 PROCEDURE — 25000128 H RX IP 250 OP 636: Performed by: PHYSICIAN ASSISTANT

## 2018-10-04 PROCEDURE — 85018 HEMOGLOBIN: CPT | Performed by: PHYSICIAN ASSISTANT

## 2018-10-04 PROCEDURE — 99207 ZZC CDG-MDM COMPONENT: MEETS MODERATE - UP CODED: CPT | Performed by: HOSPITALIST

## 2018-10-04 PROCEDURE — 12000007 ZZH R&B INTERMEDIATE

## 2018-10-04 PROCEDURE — A9270 NON-COVERED ITEM OR SERVICE: HCPCS | Mod: GY | Performed by: NURSE PRACTITIONER

## 2018-10-04 PROCEDURE — 25000132 ZZH RX MED GY IP 250 OP 250 PS 637: Performed by: PHYSICIAN ASSISTANT

## 2018-10-04 PROCEDURE — 97110 THERAPEUTIC EXERCISES: CPT | Mod: GP

## 2018-10-04 PROCEDURE — 40000193 ZZH STATISTIC PT WARD VISIT

## 2018-10-04 PROCEDURE — 97116 GAIT TRAINING THERAPY: CPT | Mod: GP

## 2018-10-04 PROCEDURE — 25000132 ZZH RX MED GY IP 250 OP 250 PS 637: Performed by: NURSE PRACTITIONER

## 2018-10-04 PROCEDURE — 97530 THERAPEUTIC ACTIVITIES: CPT | Mod: GO | Performed by: OCCUPATIONAL THERAPY ASSISTANT

## 2018-10-04 PROCEDURE — 99232 SBSQ HOSP IP/OBS MODERATE 35: CPT | Performed by: HOSPITALIST

## 2018-10-04 RX ORDER — TRAMADOL HYDROCHLORIDE 50 MG/1
50 TABLET ORAL EVERY 6 HOURS PRN
Status: DISCONTINUED | OUTPATIENT
Start: 2018-10-04 | End: 2018-10-05 | Stop reason: HOSPADM

## 2018-10-04 RX ORDER — OXYCODONE HYDROCHLORIDE 5 MG/1
5 TABLET ORAL EVERY 4 HOURS PRN
Status: DISCONTINUED | OUTPATIENT
Start: 2018-10-04 | End: 2018-10-05 | Stop reason: HOSPADM

## 2018-10-04 RX ADMIN — PANTOPRAZOLE SODIUM 40 MG: 40 TABLET, DELAYED RELEASE ORAL at 20:51

## 2018-10-04 RX ADMIN — ENOXAPARIN SODIUM 40 MG: 40 INJECTION SUBCUTANEOUS at 08:52

## 2018-10-04 RX ADMIN — ACYCLOVIR 1600 MG: 800 TABLET ORAL at 08:52

## 2018-10-04 RX ADMIN — MELATONIN 5 MG: 3 TAB ORAL at 20:50

## 2018-10-04 RX ADMIN — MAGNESIUM 64 MG (MAGNESIUM CHLORIDE) TABLET,DELAYED RELEASE 535 MG: at 20:50

## 2018-10-04 RX ADMIN — DEXTRAN 70 AND HYPROMELLOSE 2910 1 DROP: 1; 3 SOLUTION/ DROPS OPHTHALMIC at 20:54

## 2018-10-04 RX ADMIN — OXYCODONE HYDROCHLORIDE AND ACETAMINOPHEN 500 MG: 500 TABLET ORAL at 08:52

## 2018-10-04 RX ADMIN — SENNOSIDES AND DOCUSATE SODIUM 2 TABLET: 8.6; 5 TABLET ORAL at 08:52

## 2018-10-04 RX ADMIN — VITAMIN D, TAB 1000IU (100/BT) 1000 UNITS: 25 TAB at 08:52

## 2018-10-04 RX ADMIN — MONTELUKAST SODIUM 10 MG: 10 TABLET, FILM COATED ORAL at 20:50

## 2018-10-04 RX ADMIN — ACYCLOVIR 1600 MG: 800 TABLET ORAL at 20:49

## 2018-10-04 RX ADMIN — OXYCODONE HYDROCHLORIDE 5 MG: 5 TABLET ORAL at 14:08

## 2018-10-04 RX ADMIN — Medication 3 MG: at 08:52

## 2018-10-04 RX ADMIN — VITAMIN D, TAB 1000IU (100/BT) 1000 UNITS: 25 TAB at 20:49

## 2018-10-04 RX ADMIN — Medication 1 CAPSULE: at 08:52

## 2018-10-04 RX ADMIN — Medication 2 TABLET: at 08:52

## 2018-10-04 RX ADMIN — FLUTICASONE FUROATE AND VILANTEROL TRIFENATATE 1 PUFF: 100; 25 POWDER RESPIRATORY (INHALATION) at 08:55

## 2018-10-04 ASSESSMENT — ACTIVITIES OF DAILY LIVING (ADL)
ADLS_ACUITY_SCORE: 9

## 2018-10-04 NOTE — PROGRESS NOTES
"  ORTHO POD #2      S:  Doing well. Comfortable. Lethargy improved.  Moderate pain.   O:  Dressing c/d/i. No calf tenderness.     Hemoglobin   Date Value Ref Range Status   10/03/2018 10.6 (L) 11.7 - 15.7 g/dL Final   ]    /59  Pulse 80  Temp 97.8  F (36.6  C) (Oral)  Resp 16  Ht 1.549 m (5' 1\")  Wt 51.7 kg (114 lb)  SpO2 97%  BMI 21.54 kg/m2      A/P:  s/p TKA RIGHT POD #2    1. Drain fell out last evening , Dressing change on POD #2  2. Pain mgmt:  Switched to oxycodone 5 mg q4, ordered ultram as a mild pain option  3.  Plan DC on POD #3 HOME versus Rehab depending on PT progress.  Patient's  will be home for 2 weeks to assist/drive her to pre arranged outpatient PT.   4. SW ordered to look into rehab options as a back up plan  4. Lovenox for DVT prophylaxis  5.  Lethargy-improved-patient hasnt had pain RX since yesterday AM. Unable to take Tylenol.  I encouraged patient to try ultram prior to PT/OT today      Maris Peters  10/04/2018  7:45 AM    "

## 2018-10-04 NOTE — PLAN OF CARE
Problem: Patient Care Overview  Goal: Plan of Care/Patient Progress Review  Outcome: Improving  Pt. Was sleepy most of the shift.  A&O.  VSS, RA.  CMS intact.  Dressing CDI.  HemoVac came out when pt was walking to BR.  Pain managed with oxy, refused to take pain meds till 23:00 due to being sleepy from meds.  Up with 1 to BR.  Voiding adequate amount.  Continue to monitor.

## 2018-10-04 NOTE — PLAN OF CARE
Problem: Patient Care Overview  Goal: Plan of Care/Patient Progress Review  Discharge Planner PT   Patient plan for discharge: home with OP PT  Current status: Improved alertness.   Ernie present during session and hands on family education provided on how to assist pt.  1/10 pain level in right knee at rest, increases with activity during session. Participated in right TKA ex-pt able to perform without physical assist , Able to perform gait training with min A, WC follow due to increased distance of 160ft, denies lightheadedness.  No knee buckling or LOB.  participated in assisting with stair negotiation of 2 steps with 1 railing and crutch and 1 platform step (also min A of therapist for safety) Min A with sit to stand with FWW and cues for hand and foot placement, Supervision with bed mobility. Right knee ROM: 15-90deg  Barriers to return to prior living situation: level of assist-will continue family ed with  Ernie in PM session  Recommendations for discharge: home with assist of spouse and OP PT  Rationale for recommendations: Continue to recommend skilled PT to maximize level of IND mobility prior to discharge home.        Entered by: Kelsie Davey 10/04/2018 9:56 AM

## 2018-10-04 NOTE — PLAN OF CARE
Problem: Patient Care Overview  Goal: Plan of Care/Patient Progress Review  Discharge Planner OT   Patient plan for discharge: home with spouse   Current status: pt educated on safety and use of AE for tub transfer, pt completed tub transfer with SBA and AE, educated on recommended AE and community/online resources for AE needs. Pt/spouse verbalized good understanding. Pt spouse will be home for 2 weeks to assist as needed for ADLS.   Barriers to return to prior living situation: none anticipated   Recommendations for discharge: Home with A from spouse with ADL/IADL's as needed per plan established by the Occupational THerapist  Rationale for recommendations:. Pt reports spouse will be home to A with all tasks as needed.  toilet).           Entered by: Cheryl Bradshaw 10/04/2018 11:28 AM     Will discharge from OT as pt spouse will be assisting as needed for LE dressing, will assist as needed for toilet transfer and tub transfer, will acquire needed AE in community.     GOALS NOT MET, see discharge summary

## 2018-10-04 NOTE — PLAN OF CARE
Problem: Patient Care Overview  Goal: Plan of Care/Patient Progress Review  Outcome: Improving  Pt remains A/O. Up with A1 walker GB. IVSL. Declines pain medication-using ice. Frequently uses BR-baseline per pt. discharge home tomorrow. Continue to monitor.

## 2018-10-04 NOTE — CONSULTS
Care Transition Initial Assessment -   Reason For Consult: discharge planning  Met with: Patient, -Ernie    Active Problems:    S/P total knee arthroplasty, right       DATA  Lives With: spouse  Living Arrangements: house     Who is your support system?:     Identified issues/concerns regarding health management: possible TCU choices      Transportation Available: car, family or friend will provide    ASSESSMENT  Cognitive Status:  awake and alert  Concerns to be addressed: discharge planning     Per  consult for discharge planning. Pt admitted 10/2 for a total right knee arthroplasty. Pt will tentatively discharge 10/5. Reviewed medical record. Met with pt and , Ernie to discuss discharge planning. Pt and Ernie were under the impression that pt would discharge home with therapies. Pt explained Ernie will be around to help her. Pt did not feel she needed TCU. Pt has already arranged home therapies with an agency close to her home. No other resources needed.     PLAN  Financial costs for the patient includes: n/a   Patient given options and choices for discharge: discharge home  Patient/family is agreeable to the plan?  Yes  Patient Goals and Preferences: go home  Patient anticipates discharging to: discharge home     Will continue to follow and support a safe discharge plan    Dulce Sanz MSW, LGSW

## 2018-10-04 NOTE — PLAN OF CARE
Problem: Patient Care Overview  Goal: Plan of Care/Patient Progress Review  Outcome: Improving  Pt A/O x4. CMS intact. VSS. Up A1 ww and GB. Dressing CDI. Declined medication for pain, but will want before PT. Voiding adequately in BR. IV SL.

## 2018-10-04 NOTE — PROGRESS NOTES
[facility]    Hospitalist Progress Note      Assessment & Plan   Saira Pyle is a 68 year old female who was admitted on 10/2/2018.  Past medical history of SLE, TIA x 3, CAD who is status post right total knee arthroplasty on 10/02/2018 with Dr. Hardin.  The Hospitalist Service has been asked to see her in consultation for assistance with management of her medical comorbid conditions in the postoperative period.         Right total knee arthroplasty 10/02/2018  Uncomplicated procedure with Dr. Hardin.    - post-op management per ortho  - lethargy improved today with fewer narcotics     SLE  Maintained on Arava and chronic steroids.  Hx of pericardiocentesis and pericardial window for SLE related pericardial effusion in addition to ILD/restrictive lung disease and bronchiolitis thought to be associated with her SLE  - per outpatient rheumatologist, hold Arava for 2 weeks postoperatively  - continue PTA prednisone     CAD  Hyperlipidemia  CAD diagnosed by abnormal stress test 7/2018.  Cardiology recommends no angiography unless develops new chest pain concerning for typical angina.   - resume aspirin once safe from surgical perspective   - Continue PTA statin     ILD and bronchiolitis  Likely SLE related.  Follows with Dr. Valentin at [facility].   - continue PTA Breo and Singulair     History of C. diff   Received clindamycin in the perioperative period.   - monitor for diarrhea      History of transient ischemic attack.   - resume every other day aspirin when able.      Esophageal diverticulosis.   - continue PTA rabeprazole.      DVT Prophylaxis: Defer to primary service  Code Status: Full Code    Disposition: Expected discharge per ortho.    Tomás Escobar    Interval History   Feeling better today with less lethargy.  No fever/chills or dyspnea, pain is well controlled.    -Data reviewed today: I reviewed all new labs and imaging results over the last 24 hours. I personally reviewed no images or  EKG's today.    Physical Exam   Temp: 98.6  F (37  C) Temp src: Oral BP: 105/55   Heart Rate: 88 Resp: 16 SpO2: 92 % O2 Device: None (Room air)    Vitals:    10/02/18 0807   Weight: 51.7 kg (114 lb)     Vital Signs with Ranges  Temp:  [97.8  F (36.6  C)-99.2  F (37.3  C)] 98.6  F (37  C)  Heart Rate:  [63-90] 88  Resp:  [14-16] 16  BP: (100-120)/(53-64) 105/55  SpO2:  [92 %-97 %] 92 %  I/O last 3 completed shifts:  In: 120 [P.O.:120]  Out: 75 [Drains:75]    Constitutional: Well developed, well nourished female in no acute distress  Respiratory: Fine inspiratory bibasilar crackles, no wheezing or tachypnea  Cardiovascular: regular rate and rhythm, normal S1/S2 without murmur, rubs or gallops  GI:   Other:  alert and appropriate, cranial nerves grossly intact    Medications       acyclovir  1,600 mg Oral BID     ascorbic acid (VITAMIN C) tablet 500 mg  500 mg Oral QAM     calcium carbonate 600 mg-vitamin D 400 units  2 tablet Oral Daily     cholecalciferol  1,000 Units Oral BID     [START ON 10/9/2018] clotrimazole  1 Applicatorful Vaginal Q14 Days     enoxaparin  40 mg Subcutaneous Q24H     fluticasone-vilanterol  1 puff Inhalation Daily     glucosamine-chondroitin  1 capsule Oral Daily     hypromellose-dextran  1 drop Both Eyes At Bedtime     magnesium chloride  535 mg Oral At Bedtime     melatonin tablet 5 mg  5 mg Oral At Bedtime     montelukast  10 mg Oral At Bedtime     pantoprazole  40 mg Oral QPM     predniSONE (DELTASONE) half-tab 3 mg  3 mg Oral Daily     rosuvastatin  5 mg Oral Every Other Day     senna-docusate  1 tablet Oral BID    Or     senna-docusate  2 tablet Oral BID     sodium chloride (PF)  3 mL Intracatheter Q8H       Data     Recent Labs  Lab 10/04/18  1204 10/03/18  0700 10/02/18  1510   HGB 10.7* 10.6*  --    PLT  --   --  204   CR  --   --  0.56   GLC  --  102*  --        No results found for this or any previous visit (from the past 24 hour(s)).

## 2018-10-05 ENCOUNTER — APPOINTMENT (OUTPATIENT)
Dept: PHYSICAL THERAPY | Facility: CLINIC | Age: 68
DRG: 470 | End: 2018-10-05
Attending: ORTHOPAEDIC SURGERY
Payer: COMMERCIAL

## 2018-10-05 VITALS
TEMPERATURE: 98.8 F | SYSTOLIC BLOOD PRESSURE: 92 MMHG | OXYGEN SATURATION: 95 % | HEIGHT: 61 IN | WEIGHT: 114.4 LBS | HEART RATE: 80 BPM | RESPIRATION RATE: 16 BRPM | DIASTOLIC BLOOD PRESSURE: 54 MMHG | BODY MASS INDEX: 21.6 KG/M2

## 2018-10-05 LAB
CREAT SERPL-MCNC: 0.54 MG/DL (ref 0.52–1.04)
GFR SERPL CREATININE-BSD FRML MDRD: >90 ML/MIN/1.7M2
GLUCOSE SERPL-MCNC: 97 MG/DL (ref 70–99)
PLATELET # BLD AUTO: 183 10E9/L (ref 150–450)

## 2018-10-05 PROCEDURE — 99207 ZZC CDG-MDM COMPONENT: MEETS MODERATE - UP CODED: CPT | Performed by: HOSPITALIST

## 2018-10-05 PROCEDURE — 82565 ASSAY OF CREATININE: CPT | Performed by: PHYSICIAN ASSISTANT

## 2018-10-05 PROCEDURE — 97116 GAIT TRAINING THERAPY: CPT | Mod: GP

## 2018-10-05 PROCEDURE — A9270 NON-COVERED ITEM OR SERVICE: HCPCS | Mod: GY | Performed by: NURSE PRACTITIONER

## 2018-10-05 PROCEDURE — 85049 AUTOMATED PLATELET COUNT: CPT | Performed by: PHYSICIAN ASSISTANT

## 2018-10-05 PROCEDURE — 25000132 ZZH RX MED GY IP 250 OP 250 PS 637: Performed by: PHYSICIAN ASSISTANT

## 2018-10-05 PROCEDURE — 99232 SBSQ HOSP IP/OBS MODERATE 35: CPT | Performed by: HOSPITALIST

## 2018-10-05 PROCEDURE — 25000128 H RX IP 250 OP 636: Performed by: PHYSICIAN ASSISTANT

## 2018-10-05 PROCEDURE — A9270 NON-COVERED ITEM OR SERVICE: HCPCS | Mod: GY | Performed by: PHYSICIAN ASSISTANT

## 2018-10-05 PROCEDURE — 25000131 ZZH RX MED GY IP 250 OP 636 PS 637: Mod: GY | Performed by: PHYSICIAN ASSISTANT

## 2018-10-05 PROCEDURE — 36415 COLL VENOUS BLD VENIPUNCTURE: CPT | Performed by: PHYSICIAN ASSISTANT

## 2018-10-05 PROCEDURE — 82947 ASSAY GLUCOSE BLOOD QUANT: CPT | Performed by: PHYSICIAN ASSISTANT

## 2018-10-05 PROCEDURE — 97110 THERAPEUTIC EXERCISES: CPT | Mod: GP

## 2018-10-05 PROCEDURE — 40000193 ZZH STATISTIC PT WARD VISIT

## 2018-10-05 PROCEDURE — 25000132 ZZH RX MED GY IP 250 OP 250 PS 637: Mod: GY | Performed by: NURSE PRACTITIONER

## 2018-10-05 RX ORDER — OXYCODONE HYDROCHLORIDE 5 MG/1
5 TABLET ORAL EVERY 4 HOURS PRN
Qty: 25 TABLET | Refills: 0 | Status: SHIPPED | OUTPATIENT
Start: 2018-10-05 | End: 2019-09-12

## 2018-10-05 RX ORDER — AMOXICILLIN 250 MG
1 CAPSULE ORAL 2 TIMES DAILY
Qty: 28 TABLET | Refills: 0 | Status: SHIPPED | OUTPATIENT
Start: 2018-10-05 | End: 2019-09-12

## 2018-10-05 RX ADMIN — OXYCODONE HYDROCHLORIDE AND ACETAMINOPHEN 500 MG: 500 TABLET ORAL at 08:03

## 2018-10-05 RX ADMIN — Medication 1 CAPSULE: at 08:04

## 2018-10-05 RX ADMIN — ENOXAPARIN SODIUM 40 MG: 40 INJECTION SUBCUTANEOUS at 08:05

## 2018-10-05 RX ADMIN — OXYCODONE HYDROCHLORIDE 5 MG: 5 TABLET ORAL at 00:14

## 2018-10-05 RX ADMIN — SENNOSIDES AND DOCUSATE SODIUM 2 TABLET: 8.6; 5 TABLET ORAL at 08:04

## 2018-10-05 RX ADMIN — Medication 3 MG: at 08:03

## 2018-10-05 RX ADMIN — FLUTICASONE FUROATE AND VILANTEROL TRIFENATATE 1 PUFF: 100; 25 POWDER RESPIRATORY (INHALATION) at 08:03

## 2018-10-05 RX ADMIN — ROSUVASTATIN CALCIUM 5 MG: 5 TABLET, FILM COATED ORAL at 08:04

## 2018-10-05 RX ADMIN — VITAMIN D, TAB 1000IU (100/BT) 1000 UNITS: 25 TAB at 08:03

## 2018-10-05 RX ADMIN — OXYCODONE HYDROCHLORIDE 5 MG: 5 TABLET ORAL at 10:05

## 2018-10-05 RX ADMIN — Medication 2 TABLET: at 08:04

## 2018-10-05 RX ADMIN — ACYCLOVIR 1600 MG: 800 TABLET ORAL at 08:03

## 2018-10-05 ASSESSMENT — ACTIVITIES OF DAILY LIVING (ADL)
ADLS_ACUITY_SCORE: 9

## 2018-10-05 NOTE — PLAN OF CARE
Problem: Patient Care Overview  Goal: Plan of Care/Patient Progress Review  Outcome: Improving  A&O.  VSS, RA.  CMS intact.  Dressing CDI.  Pain managed with oxy and Ice pack.  Up with 1 and walker.  Urinary adequate amount, ex frequency.  No IV access, MD aware.  Possible discharge tomorrow.  Continue to monitor.

## 2018-10-05 NOTE — PROGRESS NOTES
[facility]    Hospitalist Progress Note      Assessment & Plan   Saira Pyle is a 68 year old female who was admitted on 10/2/2018.  Past medical history of SLE, TIA x 3, CAD who is status post right total knee arthroplasty on 10/02/2018 with Dr. Hardin.  The Hospitalist Service has been asked to see her in consultation for assistance with management of her medical comorbid conditions in the postoperative period.         Right total knee arthroplasty 10/02/2018  Uncomplicated procedure with Dr. Hardin.    - post-op management per ortho     SLE  Maintained on Arava and chronic steroids.  Hx of pericardiocentesis and pericardial window for SLE related pericardial effusion in addition to ILD/restrictive lung disease and bronchiolitis thought to be associated with her SLE  - per outpatient rheumatologist, hold Arava for 2 weeks postoperatively  - continue PTA prednisone     CAD  Hyperlipidemia  CAD diagnosed by abnormal stress test 7/2018.  Cardiology recommends no angiography unless develops new chest pain concerning for typical angina.   - resume aspirin once safe from surgical perspective   - Continue PTA statin     ILD and bronchiolitis  Likely SLE related.  Follows with Dr. Valentin at [facility].   - continue PTA Breo and Singulair     History of C. diff   Received clindamycin in the perioperative period.   - no diarrhea this admission     History of transient ischemic attack.   - resume aspirin as per Ortho      Esophageal diverticulosis.   - continue PTA rabeprazole.      DVT Prophylaxis: Defer to primary service  Code Status: Full Code    Disposition: Expected discharge per ortho.  OK to discharge from IM perspective.    Tomás Escobar    Interval History   Lethargy resolved.  No fever/chills, dyspnea or other complaints.    -Data reviewed today: I reviewed all new labs and imaging results over the last 24 hours. I personally reviewed no images or EKG's today.    Physical Exam   Temp: 98.8  F  (37.1  C) Temp src: Oral BP: 92/54   Heart Rate: 85 Resp: 16 SpO2: 95 % O2 Device: None (Room air)    Vitals:    10/02/18 0807 10/05/18 0706   Weight: 51.7 kg (114 lb) 51.9 kg (114 lb 6.4 oz)     Vital Signs with Ranges  Temp:  [98.5  F (36.9  C)-98.8  F (37.1  C)] 98.8  F (37.1  C)  Heart Rate:  [81-90] 85  Resp:  [16] 16  BP: ()/(54-64) 92/54  SpO2:  [92 %-97 %] 95 %  I/O last 3 completed shifts:  In: 850 [P.O.:850]  Out: -     Constitutional: Well developed, well nourished female in no acute distress, sitting at edge of bed  Respiratory: Fine inspiratory bibasilar crackles, no wheezing or tachypnea  Cardiovascular: regular rate and rhythm, normal S1/S2 without murmur, rubs or gallops  GI:   Other:  alert and appropriate, cranial nerves grossly intact    Medications       acyclovir  1,600 mg Oral BID     ascorbic acid (VITAMIN C) tablet 500 mg  500 mg Oral QAM     calcium carbonate 600 mg-vitamin D 400 units  2 tablet Oral Daily     cholecalciferol  1,000 Units Oral BID     [START ON 10/9/2018] clotrimazole  1 Applicatorful Vaginal Q14 Days     enoxaparin  40 mg Subcutaneous Q24H     fluticasone-vilanterol  1 puff Inhalation Daily     glucosamine-chondroitin  1 capsule Oral Daily     hypromellose-dextran  1 drop Both Eyes At Bedtime     magnesium chloride  535 mg Oral At Bedtime     melatonin tablet 5 mg  5 mg Oral At Bedtime     montelukast  10 mg Oral At Bedtime     pantoprazole  40 mg Oral QPM     predniSONE (DELTASONE) half-tab 3 mg  3 mg Oral Daily     rosuvastatin  5 mg Oral Every Other Day     senna-docusate  1 tablet Oral BID    Or     senna-docusate  2 tablet Oral BID       Data     Recent Labs  Lab 10/05/18  0640 10/04/18  1204 10/03/18  0700 10/02/18  1510   HGB  --  10.7* 10.6*  --      --   --  204   CR 0.54  --   --  0.56   GLC 97  --  102*  --        No results found for this or any previous visit (from the past 24 hour(s)).

## 2018-10-05 NOTE — PLAN OF CARE
Problem: Patient Care Overview  Goal: Plan of Care/Patient Progress Review  Outcome: Adequate for Discharge Date Met: 10/05/18  Paperwork and medications reviewed, lovenox teaching given, no further questions. No iv. Sent home with all belongings. Discontinue home with  at 1145.

## 2018-10-05 NOTE — PLAN OF CARE
Problem: Patient Care Overview  Goal: Plan of Care/Patient Progress Review  Discharge Planner PT   Discharge Planner PT   Patient plan for discharge: home with OP PT  Current status:  Ernie present during session and hands on family education provided on how to assist pt.  Participated in right TKA ex - required min A with SLR and  educated on how to assist pt.Handout of ex issued to pt.  Amb 150ft x 2 with SBA with FWW with No knee buckling or LOB.  participated in assisting with stair negotiation of 1 platform step with min A and FWW.  Pt's  demonstrated safety with this. Pt IND with bed mobility. MOD IND with sit to stand transfers. Right knee ROM: 10-85deg FWW issued to pt during session for discharge home.   Barriers to return to prior living situation: none  Recommendations for discharge: home with assist of spouse for stair in home and household mobility as needed and OP PT  Rationale for recommendations: Continue to recommend skilled PT to maximize level of IND, right LE strengthening and ROM.  Plan to discharge acute PT and defer further PT to outpt as pt demos safety for discharge home with spouse today. No further acute IP PT needs and all questions answered.         Entered by: Kelsie Davey 10/05/2018 11:40 AM     Physical Therapy Discharge Summary    Reason for therapy discharge:    Discharged to home with outpatient therapy.    Progress towards therapy goal(s). See goals on Care Plan in [facility] electronic health record for goal details.  Goals partially met.  Barriers to achieving goals:   discharge from facility.    Therapy recommendation(s):    Continued therapy is recommended.  Rationale/Recommendations:  to maximize her level of IND mobility and improve gait mechanics.  Continue home exercise program.

## 2018-10-05 NOTE — PROGRESS NOTES
"  ORTHO pod #3      S:  Doing well. Comfortable. Pain well controlled. Patient in PT this AM. Lethargy resolved.  O:  Dressing c/d/i. No calf tenderness.     Hemoglobin   Date Value Ref Range Status   10/04/2018 10.7 (L) 11.7 - 15.7 g/dL Final   ]    BP 92/54  Pulse 80  Temp 98.8  F (37.1  C)  Resp 16  Ht 1.549 m (5' 1\")  Wt 51.9 kg (114 lb 6.4 oz)  SpO2 95%  BMI 21.62 kg/m2      A/P:  s/p TKA POD #3    1. DC Hvac on POD #2, Dressing change on POD #2-completed  2.  Continue current pain mgmt-oxycodone only  3.  Plan DC on POD #3-orders completed  4. Lovenox for DVT prophylaxis-2 weeks post op  5.  Follow-up heikes in 2 weeks for staple removal      Maris Peters  10/05/2018  9:27 AM    "

## 2018-10-05 NOTE — PLAN OF CARE
Problem: Patient Care Overview  Goal: Plan of Care/Patient Progress Review  Outcome: Improving  Pt A/O x4. CMS intact. Dressing CDI. VSS on RA. No IV. Up A1 ww and GB. Taking oxy for pain. Voiding adequately in BR. Discharging today.

## 2018-10-09 NOTE — DISCHARGE SUMMARY
DATE OF ADMIT: 10/2/2018  DATE OF DISCHARGE: 10/5/2018  ADMITTING MD: Kelsie Hardin MD  ADMITTING DIAGNOSIS: right knee degenerative joint disease.     PROCEDURE: right total knee arthroplasty on 10/2/2018    SUMMARY OF HOSPITAL COURSE: Saira Pyle is an 68-year-old female who presented to our office with incapacitating rightknee pain secondary to severely advanced osteoarthritis. The patient has failed all forms of conservative treatment including cortisone injections and nonsteroidal anti-inflammatory drug medications. The patient has agreed and consented to do a right total knee arthroplasty for definitive management of right knee pain.     On 10/2/2018 the patient presented to [facility] and underwent an uneventful right total knee arthroplasty.  The patient was stabilized in the PACU and transferred to station [id number] for 3 days of nursing care. On pod #0 and POD #1, patient was noted to be significantly lethargic.  Narcotics were decreased and symptoms resolved by POD #2. Patient was consulted by hospitalist on pod #0 to manage home meds and PMH. No additional significant events during inpatient stay.    On POD #3, patient was clear to dc to home with outpatient PT.  Patient instructed to keep incision covered for 24 hours. After, she may shower. She will wear knee immobilizer until she can perform a straight leg raise.  She will take Lovenox for 2 weeks for DVT prophylaxis and follow up with Dr Hardin in 10-14 days for staple removal.            Medications Prior to Admission:     No prescriptions prior to admission.             Discharge Medications:     Discharge Medication List as of 10/5/2018 11:19 AM      START taking these medications    Details   enoxaparin (LOVENOX) 40 MG/0.4ML injection Inject 0.4 mLs (40 mg) Subcutaneous every 24 hours, Disp-14 Syringe, R-0, E-Prescribe2 week DVT prophylaxis      order for DME Equipment being ordered: Walker Wheels () and Walker  ()  Treatment Diagnosis: impaired gaitDisp-1 each, R-0, Local Print      oxyCODONE IR (ROXICODONE) 5 MG tablet Take 1 tablet (5 mg) by mouth every 4 hours as needed for moderate to severe pain, Disp-25 tablet, R-0, Local Print      senna-docusate (SENOKOT-S;PERICOLACE) 8.6-50 MG per tablet Take 1 tablet by mouth 2 times daily, Disp-28 tablet, R-0, E-Prescribe         CONTINUE these medications which have NOT CHANGED    Details   ACYCLOVIR PO Take 1,600 mg by mouth 2 times daily (4 x 400mg = 1600mg), Historical      Ascorbic Acid (VITAMIN C PO) Take 500 mg by mouth every morning , Historical      budesonide-formoterol (SYMBICORT) 80-4.5 MCG/ACT Inhaler Inhale 2 puffs into the lungs 2 times daily, Historical      calcium carbonate-vitamin D 600-200 MG-UNIT TABS Take 2 tablets by mouth daily , Historical      glucosamine-chondroitin 500-400 MG CAPS per capsule Take 1 capsule by mouth daily, Historical      hydroxypropyl methylcellulose (GENTEAL) 0.2 % SOLN ophthalmic solution Place 1 drop into both eyes At Bedtime , Historical      LACTOBACILLUS ACID-PECTIN PO Take 1 tablet by mouth daily, Historical      leflunomide (ARAVA) 10 MG tablet Take 10 mg by mouth daily , Historical      MAGNESIUM PO Take 500 mg by mouth At Bedtime , Historical      MELATONIN PO Take 5 mg by mouth At Bedtime, Historical      montelukast (SINGULAIR) 10 MG tablet Take 1 tablet (10 mg) by mouth At Bedtime, Disp-30 tablet, R-11, E-Prescribe      PREDNISONE PO Take 3 mg by mouth daily, Historical      RABEprazole (ACIPHEX) 20 MG EC tablet Take 20 mg by mouth every evening , Historical      rosuvastatin (CRESTOR) 5 MG tablet Take 5 mg by mouth every other day , Historical      VITAMIN D, CHOLECALCIFEROL, PO Take 1,000 Units by mouth 2 times daily, Historical      clotrimazole (LOTRIMIN) 1 % cream Place 1 Applicatorful vaginally every 14 days Alternate with estradiolHistorical      estradiol (ESTRACE VAGINAL) 0.1 MG/GM cream APPLY 1 GRAM  VAGINALLY EVERY OTHER WEEK. USE SPARINGLY. ALTERNATE WITH CLOTRIMAZOLEHistorical      pimecrolimus (ELIDEL) 1 % cream Apply topically daily as needed Historical         STOP taking these medications       ASPIRIN PO Comments:   Reason for Stopping:                         Maris Peters  9:50 AM  10/09/2018

## 2019-03-07 ENCOUNTER — OFFICE VISIT (OUTPATIENT)
Dept: PULMONOLOGY | Facility: CLINIC | Age: 69
End: 2019-03-07
Attending: INTERNAL MEDICINE
Payer: COMMERCIAL

## 2019-03-07 VITALS
DIASTOLIC BLOOD PRESSURE: 70 MMHG | BODY MASS INDEX: 19.71 KG/M2 | HEART RATE: 84 BPM | SYSTOLIC BLOOD PRESSURE: 113 MMHG | WEIGHT: 104.4 LBS | HEIGHT: 61 IN | OXYGEN SATURATION: 98 %

## 2019-03-07 DIAGNOSIS — J44.81 OBLITERATIVE BRONCHIOLITIS (H): Primary | ICD-10-CM

## 2019-03-07 DIAGNOSIS — J84.9 ILD (INTERSTITIAL LUNG DISEASE) (H): ICD-10-CM

## 2019-03-07 DIAGNOSIS — J21.9 BRONCHIOLITIS: ICD-10-CM

## 2019-03-07 PROCEDURE — G0463 HOSPITAL OUTPT CLINIC VISIT: HCPCS | Mod: ZF

## 2019-03-07 RX ORDER — ZINC GLUCONATE 50 MG
50 TABLET ORAL DAILY
COMMUNITY

## 2019-03-07 ASSESSMENT — PAIN SCALES - GENERAL: PAINLEVEL: MILD PAIN (3)

## 2019-03-07 ASSESSMENT — MIFFLIN-ST. JEOR: SCORE: 950.94

## 2019-03-07 NOTE — PROGRESS NOTES
[facility]    Reason for Visit  Saira Pyle is a 68 year old year old female who is being seen for Follow Up (ild/hx lupus)    HPI    Ms. 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      She was last seen on 8/30/18.  Since then, she has overall done quite well.  Over the winter, she did notice a subtle worsening of her breathing associated with the cold air but has adjusted well.  She also notes a small change with the humidity created by the shower.  She was initially managed on breo ellipta but found the dry powder inhaler caused a throat irritation so was changed to BID symbicort.  She has continued on this but often forgets to take it twice daily.  Continues to take montelukast without trouble.     She did have a single urgent care visit for acute onset of chest pressure and SOB about 2 months ago.  This was self-limited, and she was given a rescue inhaler which she has not really used.      She did have a knee replacement in October 2018 and this went well.       Current Outpatient Medications   Medication     ACYCLOVIR PO     Ascorbic Acid (VITAMIN C PO)     budesonide-formoterol (SYMBICORT) 80-4.5 MCG/ACT Inhaler     calcium carbonate-vitamin D 600-200 MG-UNIT TABS     clotrimazole (LOTRIMIN) 1 % cream     estradiol (ESTRACE VAGINAL) 0.1 MG/GM cream     glucosamine-chondroitin 500-400 MG CAPS per capsule     hydroxypropyl methylcellulose (GENTEAL) 0.2 % SOLN ophthalmic solution     LACTOBACILLUS ACID-PECTIN PO     leflunomide (ARAVA) 10 MG tablet     MAGNESIUM PO     MELATONIN PO     montelukast (SINGULAIR) 10 MG tablet     pimecrolimus (ELIDEL) 1 % cream     PREDNISONE PO     RABEprazole (ACIPHEX) 20 MG EC tablet     rosuvastatin (CRESTOR) 5 MG tablet     UNABLE TO FIND     VITAMIN D, CHOLECALCIFEROL, PO     zinc gluconate 50 MG tablet     enoxaparin (LOVENOX) 40 MG/0.4ML injection      "order for DME     oxyCODONE IR (ROXICODONE) 5 MG tablet     senna-docusate (SENOKOT-S;PERICOLACE) 8.6-50 MG per tablet     No current facility-administered medications for this visit.      Allergies   Allergen Reactions     Acetaminophen Anaphylaxis     Influenza Vaccines Anaphylaxis     Rituximab Hives and Itching     Other reaction(s): Breathing Difficulty     Cephalexin Hives     Amitriptyline      PN: LW Reaction: agitation, irritability     Azithromycin Other (See Comments)     \"systemic organ failure\"     Cephalosporins Hives     Cimetidine      PN: LW Reaction: GI Upset, vomiting     Codeine      PN: LW Reaction: Vomiting     Diazepam      hyperactive     Dronabinol Other (See Comments)     Other reaction(s): Headache  Nausea and vomiting     Fluoxetine Other (See Comments)     extreme agitation, cannot be combined for use with cipro     Metronidazole      Generalized illness     Metronidazole      Other reaction(s): Other - Describe In Comment Field  Extreme pain in legs     Miconazole      vaginal burning     Morphine Sulfate (Concentrate)      nausea and vomiting     Nitrofurantoin      Multiple organ failure     Nortriptyline Other (See Comments)     hyperactivity     Omeprazole      PN: LW Reaction: GI Upset     Thimerosal Other (See Comments)     Severe buring, lupus flare     Tioconazole Other (See Comments)     Vaginal burning and bleeding     Erythromycin Rash     malaria  type reaction - fever and hair fell out     Iodine Rash     Needs to have Betadine washed off aftr surgery     Latex Rash     Other reaction(s): Other  redness     Levofloxacin Rash     PN: LW Reaction: Unknown Reaction     Quinolones Rash     happened with levaquin and Ciprofloxacin     Sulfa Drugs Rash     Tetracycline Rash     Past Medical History:   Diagnosis Date     Adverse effect of other specified systemic anti-infectives and antiparasitics, subsequent encounter      Anterior corneal dystrophy      Blepharitis      " Bronchiolitis     small airways disease probably related to SLE     Bronchiolitis      Coronary artery disease      Cortical age-related cataract of both eyes      Disseminated retinitis and retinochoroiditis, pigment epitheliopathy      Diverticulitis of colon      GERD (gastroesophageal reflux disease)      Heart murmur     mitral valve disorder     Hypersomnia      ILD (interstitial lung disease) (H)     Mild ILD on chest CT, probably related to SLE     Keratopathy      MGD (meibomian gland dysfunction)      Osteopenia      Pericarditis     due to SLE, s/p pericardial window 2006     Plaquenil causing adverse effect in therapeutic use      Recurrent colitis due to Clostridium difficile      Reflux esophagitis      Restrictive lung disease      SLE (systemic lupus erythematosus related syndrome) (H)     dx in 20s; arthritis, serositis (pericarditis and pleuritis)     SLE (systemic lupus erythematosus related syndrome) (H)      Tracheostomy in place (H)        Past Surgical History:   Procedure Laterality Date     ARTHROPLASTY KNEE Right 10/2/2018    Procedure: ARTHROPLASTY KNEE;  RIGHT TOTAL KNEE ARTHROPLASTY ;  Surgeon: Kelsie Hardin MD;  Location: [facility] OR     CHOLECYSTECTOMY  04/2004     GYN SURGERY  04/2001    LEEP     GYN SURGERY  1985    removal of uterine fibroids     ORTHOPEDIC SURGERY Right     knee cartilage     ORTHOPEDIC SURGERY Left 2005    foot surgery     ORTHOPEDIC SURGERY Left     knee meniscus     ORTHOPEDIC SURGERY Left     thumb     ORTHOPEDIC SURGERY Right 2008    ulnar release     ORTHOPEDIC SURGERY Left 2017    ulnar release     THORACIC SURGERY      periocardiocentesis       Social History     Socioeconomic History     Marital status:      Spouse name: Not on file     Number of children: Not on file     Years of education: Not on file     Highest education level: Not on file   Occupational History     Not on file   Social Needs     Financial resource strain: Not on file     Food  "insecurity:     Worry: Not on file     Inability: Not on file     Transportation needs:     Medical: Not on file     Non-medical: Not on file   Tobacco Use     Smoking status: Never Smoker     Smokeless tobacco: Never Used   Substance and Sexual Activity     Alcohol use: Not on file     Drug use: Not on file     Sexual activity: Not on file   Lifestyle     Physical activity:     Days per week: Not on file     Minutes per session: Not on file     Stress: Not on file   Relationships     Social connections:     Talks on phone: Not on file     Gets together: Not on file     Attends Muslim service: Not on file     Active member of club or organization: Not on file     Attends meetings of clubs or organizations: Not on file     Relationship status: Not on file     Intimate partner violence:     Fear of current or ex partner: Not on file     Emotionally abused: Not on file     Physically abused: Not on file     Forced sexual activity: Not on file   Other Topics Concern     Not on file   Social History Narrative    .  Had exposure to second hand tobacco smoke as a child.    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    Vitals: /70 (BP Location: Right arm, Cuff Size: Adult Regular)   Pulse 84   Ht 1.549 m (5' 1\")   Wt 47.4 kg (104 lb 6.4 oz)   SpO2 98%   BMI 19.73 kg/m      Exam:   GENERAL APPEARANCE: Well developed, well " nourished, alert, and in no apparent distress.  RESP: good air flow throughout.  Few bibasilar inspiratory crackles. No rhonchi. No wheezes.  CV: Normal S1, S2, regular rhythm, normal rate. No murmur.  No LE edema.   MS:  Extremities normal. No clubbing. No cyanosis.  SKIN: No rash on limited exam.  NEURO: Mentation intact, speech normal, normal gait and stance.  PSYCH: mentation appears normal. and affect normal/bright.    Results:  Recent Results (from the past 168 hour(s))   General PFT Lab (Please always keep checked)    Collection Time: 03/07/19  9:59 AM   Result Value Ref Range    FVC-Pred 2.67 L    FVC-Pre 2.02 L    FVC-%Pred-Pre 75 %    FEV1-Pre 1.63 L    FEV1-%Pred-Pre 77 %    FEV1FVC-Pred 79 %    FEV1FVC-Pre 81 %    FEFMax-Pred 5.48 L/sec    FEFMax-Pre 6.73 L/sec    FEFMax-%Pred-Pre 122 %    FEF2575-Pred 1.89 L/sec    FEF2575-Pre 1.63 L/sec    TFG9985-%Pred-Pre 86 %    ExpTime-Pre 7.54 sec    FIFMax-Pre 5.50 L/sec    VC-Pred 2.76 L    VC-Pre 2.22 L    VC-%Pred-Pre 80 %    IC-Pred 1.80 L    IC-Pre 1.84 L    IC-%Pred-Pre 101 %    ERV-Pred 0.96 L    ERV-Pre 0.39 L    ERV-%Pred-Pre 40 %    FEV1FEV6-Pred 80 %    FEV1FEV6-Pre 81 %    FRCPleth-Pred 2.54 L    FRCPleth-Pre 2.11 L    FRCPleth-%Pred-Pre 83 %    RVPleth-Pred 1.86 L    RVPleth-Pre 1.73 L    RVPleth-%Pred-Pre 92 %    TLCPleth-Pred 4.44 L    TLCPleth-Pre 3.95 L    TLCPleth-%Pred-Pre 89 %    DLCOunc-Pred 17.80 ml/min/mmHg    DLCOunc-Pre 13.58 ml/min/mmHg    DLCOunc-%Pred-Pre 76 %    VA-Pre 3.06 L    VA-%Pred-Pre 73 %    FEV1SVC-Pred 76 %    FEV1SVC-Pre 73 %       I reviewed pulmonary function test that was performed today.  This shows normal spirometry and lung volumes and diffusion.  FEV1 and FVC are both down slightly from previously.     I reviewed results with the patient.      Assessment and plan:   Ms. Bhaskar Pyle is a 68-year-old who is here to follow-up mild ILD and bronchiolitis due to lupus.     1.  Bronchiolitis.    Subjective TOMLINSON is stable,  but she has had a slight decrease in PFTs.  This may be secondary to medication compliance as she is not taking the symbicort BID and did not take it this AM.  She did have a good response to Breo Ellipta and montelukast previously so anticipate improvement with better compliance.  We will continue the symbicort + montelukast and she will be more vigilant about taking the inhaler twice daily.  No azithromycin due to past adverse reaction (although the report sounds more like a UTI with sepsis).  Encouraged ongoing activity.      RTC:  6 months with full PFT.     Patient was seen and discussed with Dr. Kelly who agrees with the plan.      Davonte Gibson MD, MPH  Pulmonary & Critical Care, [place]  [phone]       I saw and evaluated patient with Fellow.  Case discussed - agree with note.   I reviewed pulmonary function test that was performed today.  This shows normal values.  However, there has been a slight decline in spirometry.  I encouraged her to use Symbicort twice daily and to gargle with water after use.  We gave her a couple extra spacers to use, and I recommended that she rinse out the spacer regularly.  She did not get a flu vaccine, and this is because she is allergic to many antibiotics, and she says that the flu vaccine has gentamicin and formaldehyde in it.    KESHAWN KELLY M.D.

## 2019-03-07 NOTE — NURSING NOTE
Chief Complaint   Patient presents with     Follow Up     ild/hx lupus     Vitals were taken and medications were reconciled.     Tanner Benavides, ROBERT  11:31 AM

## 2019-03-07 NOTE — LETTER
3/7/2019      RE: Saira Pyle  [address] [facility] [address]       [facility]    Reason for Visit  Saira Pyle is a 68 year old year old female who is being seen for Follow Up (ild/hx lupus)    HPI    Ms. 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      She was last seen on 8/30/18.  Since then, she has overall done quite well.  Over the winter, she did notice a subtle worsening of her breathing associated with the cold air but has adjusted well.  She also notes a small change with the humidity created by the shower.  She was initially managed on breo ellipta but found the dry powder inhaler caused a throat irritation so was changed to BID symbicort.  She has continued on this but often forgets to take it twice daily.  Continues to take montelukast without trouble.     She did have a single urgent care visit for acute onset of chest pressure and SOB about 2 months ago.  This was self-limited, and she was given a rescue inhaler which she has not really used.      She did have a knee replacement in October 2018 and this went well.       Current Outpatient Medications   Medication     ACYCLOVIR PO     Ascorbic Acid (VITAMIN C PO)     budesonide-formoterol (SYMBICORT) 80-4.5 MCG/ACT Inhaler     calcium carbonate-vitamin D 600-200 MG-UNIT TABS     clotrimazole (LOTRIMIN) 1 % cream     estradiol (ESTRACE VAGINAL) 0.1 MG/GM cream     glucosamine-chondroitin 500-400 MG CAPS per capsule     hydroxypropyl methylcellulose (GENTEAL) 0.2 % SOLN ophthalmic solution     LACTOBACILLUS ACID-PECTIN PO     leflunomide (ARAVA) 10 MG tablet     MAGNESIUM PO     MELATONIN PO     montelukast (SINGULAIR) 10 MG tablet     pimecrolimus (ELIDEL) 1 % cream     PREDNISONE PO     RABEprazole (ACIPHEX) 20 MG EC tablet     rosuvastatin (CRESTOR) 5 MG tablet     UNABLE TO FIND     VITAMIN D,  "CHOLECALCIFEROL, PO     zinc gluconate 50 MG tablet     enoxaparin (LOVENOX) 40 MG/0.4ML injection     order for DME     oxyCODONE IR (ROXICODONE) 5 MG tablet     senna-docusate (SENOKOT-S;PERICOLACE) 8.6-50 MG per tablet     No current facility-administered medications for this visit.      Allergies   Allergen Reactions     Acetaminophen Anaphylaxis     Influenza Vaccines Anaphylaxis     Rituximab Hives and Itching     Other reaction(s): Breathing Difficulty     Cephalexin Hives     Amitriptyline      PN: LW Reaction: agitation, irritability     Azithromycin Other (See Comments)     \"systemic organ failure\"     Cephalosporins Hives     Cimetidine      PN: LW Reaction: GI Upset, vomiting     Codeine      PN: LW Reaction: Vomiting     Diazepam      hyperactive     Dronabinol Other (See Comments)     Other reaction(s): Headache  Nausea and vomiting     Fluoxetine Other (See Comments)     extreme agitation, cannot be combined for use with cipro     Metronidazole      Generalized illness     Metronidazole      Other reaction(s): Other - Describe In Comment Field  Extreme pain in legs     Miconazole      vaginal burning     Morphine Sulfate (Concentrate)      nausea and vomiting     Nitrofurantoin      Multiple organ failure     Nortriptyline Other (See Comments)     hyperactivity     Omeprazole      PN: LW Reaction: GI Upset     Thimerosal Other (See Comments)     Severe buring, lupus flare     Tioconazole Other (See Comments)     Vaginal burning and bleeding     Erythromycin Rash     malaria  type reaction - fever and hair fell out     Iodine Rash     Needs to have Betadine washed off aftr surgery     Latex Rash     Other reaction(s): Other  redness     Levofloxacin Rash     PN: LW Reaction: Unknown Reaction     Quinolones Rash     happened with levaquin and Ciprofloxacin     Sulfa Drugs Rash     Tetracycline Rash     Past Medical History:   Diagnosis Date     Adverse effect of other specified systemic anti-infectives " and antiparasitics, subsequent encounter      Anterior corneal dystrophy      Blepharitis      Bronchiolitis     small airways disease probably related to SLE     Bronchiolitis      Coronary artery disease      Cortical age-related cataract of both eyes      Disseminated retinitis and retinochoroiditis, pigment epitheliopathy      Diverticulitis of colon      GERD (gastroesophageal reflux disease)      Heart murmur     mitral valve disorder     Hypersomnia      ILD (interstitial lung disease) (H)     Mild ILD on chest CT, probably related to SLE     Keratopathy      MGD (meibomian gland dysfunction)      Osteopenia      Pericarditis     due to SLE, s/p pericardial window 2006     Plaquenil causing adverse effect in therapeutic use      Recurrent colitis due to Clostridium difficile      Reflux esophagitis      Restrictive lung disease      SLE (systemic lupus erythematosus related syndrome) (H)     dx in 20s; arthritis, serositis (pericarditis and pleuritis)     SLE (systemic lupus erythematosus related syndrome) (H)      Tracheostomy in place (H)        Past Surgical History:   Procedure Laterality Date     ARTHROPLASTY KNEE Right 10/2/2018    Procedure: ARTHROPLASTY KNEE;  RIGHT TOTAL KNEE ARTHROPLASTY ;  Surgeon: Kelsie Hardin MD;  Location: [facility] OR     CHOLECYSTECTOMY  04/2004     GYN SURGERY  04/2001    LEEP     GYN SURGERY  1985    removal of uterine fibroids     ORTHOPEDIC SURGERY Right     knee cartilage     ORTHOPEDIC SURGERY Left 2005    foot surgery     ORTHOPEDIC SURGERY Left     knee meniscus     ORTHOPEDIC SURGERY Left     thumb     ORTHOPEDIC SURGERY Right 2008    ulnar release     ORTHOPEDIC SURGERY Left 2017    ulnar release     THORACIC SURGERY      periocardiocentesis       Social History     Socioeconomic History     Marital status:      Spouse name: Not on file     Number of children: Not on file     Years of education: Not on file     Highest education level: Not on file  "  Occupational History     Not on file   Social Needs     Financial resource strain: Not on file     Food insecurity:     Worry: Not on file     Inability: Not on file     Transportation needs:     Medical: Not on file     Non-medical: Not on file   Tobacco Use     Smoking status: Never Smoker     Smokeless tobacco: Never Used   Substance and Sexual Activity     Alcohol use: Not on file     Drug use: Not on file     Sexual activity: Not on file   Lifestyle     Physical activity:     Days per week: Not on file     Minutes per session: Not on file     Stress: Not on file   Relationships     Social connections:     Talks on phone: Not on file     Gets together: Not on file     Attends Episcopal service: Not on file     Active member of club or organization: Not on file     Attends meetings of clubs or organizations: Not on file     Relationship status: Not on file     Intimate partner violence:     Fear of current or ex partner: Not on file     Emotionally abused: Not on file     Physically abused: Not on file     Forced sexual activity: Not on file   Other Topics Concern     Not on file   Social History Narrative    .  Had exposure to second hand tobacco smoke as a child.    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    Vitals: /70 (BP Location: Right arm, Cuff Size: Adult Regular)   Pulse 84   Ht 1.549 m (5' 1\")   Wt " 47.4 kg (104 lb 6.4 oz)   SpO2 98%   BMI 19.73 kg/m       Exam:   GENERAL APPEARANCE: Well developed, well nourished, alert, and in no apparent distress.  RESP: good air flow throughout.  Few bibasilar inspiratory crackles. No rhonchi. No wheezes.  CV: Normal S1, S2, regular rhythm, normal rate. No murmur.  No LE edema.   MS:  Extremities normal. No clubbing. No cyanosis.  SKIN: No rash on limited exam.  NEURO: Mentation intact, speech normal, normal gait and stance.  PSYCH: mentation appears normal. and affect normal/bright.    Results:  Recent Results (from the past 168 hour(s))   General PFT Lab (Please always keep checked)    Collection Time: 03/07/19  9:59 AM   Result Value Ref Range    FVC-Pred 2.67 L    FVC-Pre 2.02 L    FVC-%Pred-Pre 75 %    FEV1-Pre 1.63 L    FEV1-%Pred-Pre 77 %    FEV1FVC-Pred 79 %    FEV1FVC-Pre 81 %    FEFMax-Pred 5.48 L/sec    FEFMax-Pre 6.73 L/sec    FEFMax-%Pred-Pre 122 %    FEF2575-Pred 1.89 L/sec    FEF2575-Pre 1.63 L/sec    XYE5709-%Pred-Pre 86 %    ExpTime-Pre 7.54 sec    FIFMax-Pre 5.50 L/sec    VC-Pred 2.76 L    VC-Pre 2.22 L    VC-%Pred-Pre 80 %    IC-Pred 1.80 L    IC-Pre 1.84 L    IC-%Pred-Pre 101 %    ERV-Pred 0.96 L    ERV-Pre 0.39 L    ERV-%Pred-Pre 40 %    FEV1FEV6-Pred 80 %    FEV1FEV6-Pre 81 %    FRCPleth-Pred 2.54 L    FRCPleth-Pre 2.11 L    FRCPleth-%Pred-Pre 83 %    RVPleth-Pred 1.86 L    RVPleth-Pre 1.73 L    RVPleth-%Pred-Pre 92 %    TLCPleth-Pred 4.44 L    TLCPleth-Pre 3.95 L    TLCPleth-%Pred-Pre 89 %    DLCOunc-Pred 17.80 ml/min/mmHg    DLCOunc-Pre 13.58 ml/min/mmHg    DLCOunc-%Pred-Pre 76 %    VA-Pre 3.06 L    VA-%Pred-Pre 73 %    FEV1SVC-Pred 76 %    FEV1SVC-Pre 73 %       I reviewed pulmonary function test that was performed today.  This shows normal spirometry and lung volumes and diffusion.  FEV1 and FVC are both down slightly from previously.     I reviewed results with the patient.      Assessment and plan:   Ms. Bhaskar Pyle is a 68-year-old who is  here to follow-up mild ILD and bronchiolitis due to lupus.     1.  Bronchiolitis.    Subjective TOMLINSON is stable, but she has had a slight decrease in PFTs.  This may be secondary to medication compliance as she is not taking the symbicort BID and did not take it this AM.  She did have a good response to Breo Ellipta and montelukast previously so anticipate improvement with better compliance.  We will continue the symbicort + montelukast and she will be more vigilant about taking the inhaler twice daily.  No azithromycin due to past adverse reaction (although the report sounds more like a UTI with sepsis).  Encouraged ongoing activity.      RTC:  6 months with full PFT.     Patient was seen and discussed with Dr. Kelly who agrees with the plan.      Davonte Gibson MD, MPH  Pulmonary & Critical [facility], [place]  [phone]       I saw and evaluated patient with Fellow.  Case discussed - agree with note.   I reviewed pulmonary function test that was performed today.  This shows normal values.  However, there has been a slight decline in spirometry.  I encouraged her to use Symbicort twice daily and to gargle with water after use.  We gave her a couple extra spacers to use, and I recommended that she rinse out the spacer regularly.  She did not get a flu vaccine, and this is because she is allergic to many antibiotics, and she says that the flu vaccine has gentamicin and formaldehyde in it.    KESHAWN KELLY M.D.

## 2019-03-09 LAB
DLCOUNC-%PRED-PRE: 76 %
DLCOUNC-PRE: 13.58 ML/MIN/MMHG
DLCOUNC-PRED: 17.8 ML/MIN/MMHG
ERV-%PRED-PRE: 40 %
ERV-PRE: 0.39 L
ERV-PRED: 0.96 L
EXPTIME-PRE: 7.54 SEC
FEF2575-%PRED-PRE: 86 %
FEF2575-PRE: 1.63 L/SEC
FEF2575-PRED: 1.89 L/SEC
FEFMAX-%PRED-PRE: 122 %
FEFMAX-PRE: 6.73 L/SEC
FEFMAX-PRED: 5.48 L/SEC
FEV1-%PRED-PRE: 77 %
FEV1-PRE: 1.63 L
FEV1FEV6-PRE: 81 %
FEV1FEV6-PRED: 80 %
FEV1FVC-PRE: 81 %
FEV1FVC-PRED: 79 %
FEV1SVC-PRE: 73 %
FEV1SVC-PRED: 76 %
FIFMAX-PRE: 5.5 L/SEC
FRCPLETH-%PRED-PRE: 83 %
FRCPLETH-PRE: 2.11 L
FRCPLETH-PRED: 2.54 L
FVC-%PRED-PRE: 75 %
FVC-PRE: 2.02 L
FVC-PRED: 2.67 L
IC-%PRED-PRE: 101 %
IC-PRE: 1.84 L
IC-PRED: 1.8 L
RVPLETH-%PRED-PRE: 92 %
RVPLETH-PRE: 1.73 L
RVPLETH-PRED: 1.86 L
TLCPLETH-%PRED-PRE: 89 %
TLCPLETH-PRE: 3.95 L
TLCPLETH-PRED: 4.44 L
VA-%PRED-PRE: 73 %
VA-PRE: 3.06 L
VC-%PRED-PRE: 80 %
VC-PRE: 2.22 L
VC-PRED: 2.76 L

## 2019-06-07 DIAGNOSIS — J21.9 BRONCHIOLITIS: ICD-10-CM

## 2019-06-10 RX ORDER — MONTELUKAST SODIUM 10 MG/1
10 TABLET ORAL AT BEDTIME
Qty: 30 TABLET | Refills: 11 | Status: SHIPPED | OUTPATIENT
Start: 2019-06-10 | End: 2020-06-01

## 2019-09-12 ENCOUNTER — OFFICE VISIT (OUTPATIENT)
Dept: PULMONOLOGY | Facility: CLINIC | Age: 69
End: 2019-09-12
Attending: INTERNAL MEDICINE
Payer: COMMERCIAL

## 2019-09-12 VITALS
SYSTOLIC BLOOD PRESSURE: 146 MMHG | WEIGHT: 110 LBS | HEIGHT: 61 IN | DIASTOLIC BLOOD PRESSURE: 86 MMHG | HEART RATE: 92 BPM | BODY MASS INDEX: 20.77 KG/M2 | RESPIRATION RATE: 17 BRPM | OXYGEN SATURATION: 100 %

## 2019-09-12 DIAGNOSIS — J44.81 OBLITERATIVE BRONCHIOLITIS (H): ICD-10-CM

## 2019-09-12 DIAGNOSIS — J84.9 ILD (INTERSTITIAL LUNG DISEASE) (H): Primary | ICD-10-CM

## 2019-09-12 DIAGNOSIS — J21.9 BRONCHIOLITIS: ICD-10-CM

## 2019-09-12 LAB
DLCOUNC-%PRED-PRE: 73 %
DLCOUNC-PRE: 13.11 ML/MIN/MMHG
DLCOUNC-PRED: 17.73 ML/MIN/MMHG
ERV-%PRED-PRE: 43 %
ERV-PRE: 0.37 L
ERV-PRED: 0.84 L
EXPTIME-PRE: 5.02 SEC
FEF2575-%PRED-PRE: 110 %
FEF2575-PRE: 2.05 L/SEC
FEF2575-PRED: 1.85 L/SEC
FEFMAX-%PRED-PRE: 125 %
FEFMAX-PRE: 6.82 L/SEC
FEFMAX-PRED: 5.41 L/SEC
FEV1-%PRED-PRE: 79 %
FEV1-PRE: 1.64 L
FEV1FEV6-PRE: 84 %
FEV1FEV6-PRED: 80 %
FEV1FVC-PRE: 84 %
FEV1FVC-PRED: 79 %
FEV1SVC-PRE: 82 %
FEV1SVC-PRED: 76 %
FIFMAX-PRE: 5.56 L/SEC
FRCPLETH-%PRED-PRE: 71 %
FRCPLETH-PRE: 1.81 L
FRCPLETH-PRED: 2.54 L
FVC-%PRED-PRE: 74 %
FVC-PRE: 1.96 L
FVC-PRED: 2.64 L
IC-%PRED-PRE: 86 %
IC-PRE: 1.64 L
IC-PRED: 1.9 L
RVPLETH-%PRED-PRE: 76 %
RVPLETH-PRE: 1.44 L
RVPLETH-PRED: 1.88 L
TLCPLETH-%PRED-PRE: 77 %
TLCPLETH-PRE: 3.45 L
TLCPLETH-PRED: 4.44 L
VA-%PRED-PRE: 73 %
VA-PRE: 3.05 L
VC-%PRED-PRE: 73 %
VC-PRE: 2.01 L
VC-PRED: 2.74 L

## 2019-09-12 PROCEDURE — G0463 HOSPITAL OUTPT CLINIC VISIT: HCPCS | Mod: ZF

## 2019-09-12 ASSESSMENT — MIFFLIN-ST. JEOR: SCORE: 971.34

## 2019-09-12 ASSESSMENT — PAIN SCALES - GENERAL: PAINLEVEL: NO PAIN (0)

## 2019-09-12 NOTE — LETTER
9/12/2019       RE: Saira Pyle  [address]  [facility] [address]     Dear Colleague,    Thank you for referring your patient, Saira Pyle, to the [facility] at [facility]. Please see a copy of my visit note below.    [facility]    Reason for Visit  Saira Pyle is a 69 year old year old female who is being seen for RECHECK (ILD Lupus)    HPI    Ms. 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     She is going to have a left knee replacement on October 1.  She reports no dyspnea when she walks up one flight of stairs, although she does feel short of breath if she walks up an incline.  She also endorses a morning cough that is dry.  She denies wheezing.  She is taking Breo Ellipta 1 puff daily, and she feels that this helps her breathing.  She restarted Breo because her pharmacy sent her a Breo refill instead of Symbicort, and she is tolerating it better now than she did before because she targets it towards the back of her throat instead of into her mouth.  Her breathing felt tight this morning, but it improved after she took her Breo Ellipta.  She also takes montelukast daily.  She reports that her joints are under good control.  She currently takes leflunomide, turmeric, and low-dose prednisone.  She usually does not get the flu vaccine because she is allergic to many antibiotics, and she has discovered that there is an antibiotic in the flu vaccine.        Current Outpatient Medications   Medication     ACYCLOVIR PO     Ascorbic Acid (VITAMIN C PO)     calcium carbonate-vitamin D 600-200 MG-UNIT TABS     clotrimazole (LOTRIMIN) 1 % cream     estradiol (ESTRACE VAGINAL) 0.1 MG/GM cream     fluticasone-vilanterol (BREO ELLIPTA) 100-25 MCG/INH inhaler     glucosamine-chondroitin 500-400 MG  "CAPS per capsule     hydroxypropyl methylcellulose (GENTEAL) 0.2 % SOLN ophthalmic solution     leflunomide (ARAVA) 10 MG tablet     MELATONIN PO     montelukast (SINGULAIR) 10 MG tablet     pimecrolimus (ELIDEL) 1 % cream     PREDNISONE PO     RABEprazole (ACIPHEX) 20 MG EC tablet     rosuvastatin (CRESTOR) 5 MG tablet     UNABLE TO FIND     VITAMIN D, CHOLECALCIFEROL, PO     zinc gluconate 50 MG tablet     LACTOBACILLUS ACID-PECTIN PO     MAGNESIUM PO     order for DME     No current facility-administered medications for this visit.      Allergies   Allergen Reactions     Acetaminophen Anaphylaxis     Influenza Vaccines Anaphylaxis     Rituximab Hives and Itching     Other reaction(s): Breathing Difficulty     Cephalexin Hives     Amitriptyline      PN: LW Reaction: agitation, irritability     Azithromycin Other (See Comments)     \"systemic organ failure\"     Cephalosporins Hives     Cimetidine      PN: LW Reaction: GI Upset, vomiting     Codeine      PN: LW Reaction: Vomiting     Diazepam      hyperactive     Dronabinol Other (See Comments)     Other reaction(s): Headache  Nausea and vomiting     Fluoxetine Other (See Comments)     extreme agitation, cannot be combined for use with cipro     Metronidazole      Generalized illness     Metronidazole      Other reaction(s): Other - Describe In Comment Field  Extreme pain in legs     Miconazole      vaginal burning     Morphine Sulfate (Concentrate)      nausea and vomiting     Nitrofurantoin      Multiple organ failure     Nortriptyline Other (See Comments)     hyperactivity     Omeprazole      PN: LW Reaction: GI Upset     Thimerosal Other (See Comments)     Severe buring, lupus flare     Tioconazole Other (See Comments)     Vaginal burning and bleeding     Erythromycin Rash     malaria  type reaction - fever and hair fell out     Iodine Rash     Needs to have Betadine washed off aftr surgery     Latex Rash     Other reaction(s): Other  redness     Levofloxacin " Rash     PN: LW Reaction: Unknown Reaction     Quinolones Rash     happened with levaquin and Ciprofloxacin     Sulfa Drugs Rash     Tetracycline Rash     Past Medical History:   Diagnosis Date     Adverse effect of other specified systemic anti-infectives and antiparasitics, subsequent encounter      Anterior corneal dystrophy      Blepharitis      Bronchiolitis     small airways disease probably related to SLE     Bronchiolitis      Coronary artery disease      Cortical age-related cataract of both eyes      Disseminated retinitis and retinochoroiditis, pigment epitheliopathy      Diverticulitis of colon      GERD (gastroesophageal reflux disease)      Heart murmur     mitral valve disorder     Hypersomnia      ILD (interstitial lung disease) (H)     Mild ILD on chest CT, probably related to SLE     Keratopathy      MGD (meibomian gland dysfunction)      Osteopenia      Pericarditis     due to SLE, s/p pericardial window 2006     Plaquenil causing adverse effect in therapeutic use      Recurrent colitis due to Clostridium difficile      Reflux esophagitis      Restrictive lung disease      SLE (systemic lupus erythematosus related syndrome) (H)     dx in 20s; arthritis, serositis (pericarditis and pleuritis)     SLE (systemic lupus erythematosus related syndrome) (H)      Tracheostomy in place (H)        Past Surgical History:   Procedure Laterality Date     ARTHROPLASTY KNEE Right 10/2/2018    Procedure: ARTHROPLASTY KNEE;  RIGHT TOTAL KNEE ARTHROPLASTY ;  Surgeon: Kelsie Hardin MD;  Location: [facility] OR     CHOLECYSTECTOMY  04/2004     GYN SURGERY  04/2001    LEEP     GYN SURGERY  1985    removal of uterine fibroids     ORTHOPEDIC SURGERY Right     knee cartilage     ORTHOPEDIC SURGERY Left 2005    foot surgery     ORTHOPEDIC SURGERY Left     knee meniscus     ORTHOPEDIC SURGERY Left     thumb     ORTHOPEDIC SURGERY Right 2008    ulnar release     ORTHOPEDIC SURGERY Left 2017    ulnar release     THORACIC  SURGERY      periocardiocentesis       Social History     Socioeconomic History     Marital status:      Spouse name: Not on file     Number of children: Not on file     Years of education: Not on file     Highest education level: Not on file   Occupational History     Not on file   Social Needs     Financial resource strain: Not on file     Food insecurity:     Worry: Not on file     Inability: Not on file     Transportation needs:     Medical: Not on file     Non-medical: Not on file   Tobacco Use     Smoking status: Never Smoker     Smokeless tobacco: Never Used   Substance and Sexual Activity     Alcohol use: Not on file     Drug use: Not on file     Sexual activity: Not on file   Lifestyle     Physical activity:     Days per week: Not on file     Minutes per session: Not on file     Stress: Not on file   Relationships     Social connections:     Talks on phone: Not on file     Gets together: Not on file     Attends Mosque service: Not on file     Active member of club or organization: Not on file     Attends meetings of clubs or organizations: Not on file     Relationship status: Not on file     Intimate partner violence:     Fear of current or ex partner: Not on file     Emotionally abused: Not on file     Physically abused: Not on file     Forced sexual activity: Not on file   Other Topics Concern     Not on file   Social History Narrative    .  Had exposure to second hand tobacco smoke as a child.    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    Vitals: BP (!) 146/86   Pulse 92   Resp 17   Ht 1.549 m (5' 1\")   Wt 49.9 kg (110 lb)   SpO2 100%   BMI 20.78 kg/m       Exam:   GENERAL APPEARANCE: Well developed, well nourished, alert, and in no apparent distress.  RESP: good air flow throughout.  Few bibasilar inspiratory crackles. No rhonchi. No wheezes. No inspiratory squeaks.  CV: Normal S1, S2, regular rhythm, normal rate. No murmur.  No LE edema.   MS:  Extremities normal. No clubbing. No cyanosis.  SKIN: No rash on limited exam.  NEURO: Mentation intact, speech normal, normal gait and stance.  PSYCH: mentation appears normal. and affect normal/bright      Results:  Recent Results (from the past 168 hour(s))   General PFT Lab (Please always keep checked)    Collection Time: 09/12/19 10:19 AM   Result Value Ref Range    FVC-Pred 2.64 L    FVC-Pre 1.96 L    FVC-%Pred-Pre 74 %    FEV1-Pre 1.64 L    FEV1-%Pred-Pre 79 %    FEV1FVC-Pred 79 %    FEV1FVC-Pre 84 %    FEFMax-Pred 5.41 L/sec    FEFMax-Pre 6.82 L/sec    FEFMax-%Pred-Pre 125 %    FEF2575-Pred 1.85 L/sec    FEF2575-Pre 2.05 L/sec    JRT2054-%Pred-Pre 110 %    ExpTime-Pre 5.02 sec    FIFMax-Pre 5.56 L/sec    VC-Pred 2.74 L    VC-Pre 2.01 L    VC-%Pred-Pre 73 %    IC-Pred 1.90 L    IC-Pre 1.64 L    IC-%Pred-Pre 86 %    ERV-Pred 0.84 L    ERV-Pre 0.37 L    ERV-%Pred-Pre 43 %    FEV1FEV6-Pred 80 %    FEV1FEV6-Pre 84 %    FRCPleth-Pred 2.54 L    FRCPleth-Pre 1.81 L    FRCPleth-%Pred-Pre 71 %    RVPleth-Pred 1.88 L    RVPleth-Pre 1.44 L    RVPleth-%Pred-Pre 76 %    TLCPleth-Pred 4.44 L    TLCPleth-Pre 3.45 L    TLCPleth-%Pred-Pre 77 %    DLCOunc-Pred 17.73 ml/min/mmHg    DLCOunc-Pre 13.11 ml/min/mmHg    DLCOunc-%Pred-Pre 73 %    VA-Pre 3.05 L    VA-%Pred-Pre 73 %    FEV1SVC-Pred 76 %    FEV1SVC-Pre 82 %       I reviewed pulmonary function test that was " performed today.  This shows normal spirometry, lung volumes, and diffusion.  Lung function is stable.  I reviewed results with the patient.      Assessment and plan:   Ms. Bhaskar Pyle is a 69-year-old who is here to follow-up mild ILD and bronchiolitis due to lupus.   1.  Bronchiolitis.  She has mild dyspnea on exertion, but Breo helps.  We will continue Breo Ellipta 1 puff daily.  I advised her again to rinse her mouth out with water after use.  She is going to have a left total knee replacement on October 1, followed by physical therapy.  She should continue Breo Ellipta and montelukast.  No azithromycin due to past adverse reaction (although the report sounds more like a UTI with sepsis).  She will return in 6 months with full PFT.  2.  ILD.  She has mild ILD by chest CT, probably related to lupus.  No specific treatment is necessary at this time as her PFT is stable.  She did not tolerate mycophenolate in the past due to GI side effects.  She did not tolerate rituximab in the past because of infusion reaction (tightness and burning in the throat).  If her ILD worsens and becomes fibrotic in the future, then nintedanib might be an option if the INBUILD study results are positive.      Again, thank you for allowing me to participate in the care of your patient.      Sincerely,    Petra Valentin MD

## 2019-09-12 NOTE — NURSING NOTE
Chief Complaint   Patient presents with     RECHECK     ILD Lupus    Medications reviewed and vital signs taken.   Sudhakar Vallejo, CMA

## 2019-09-12 NOTE — PROGRESS NOTES
[facility]    Reason for Visit  Saira Pyle is a 69 year old year old female who is being seen for RECHECK (ILD Lupus)    HPI    Ms. 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    She is going to have a left knee replacement on October 1.  She reports no dyspnea when she walks up one flight of stairs, although she does feel short of breath if she walks up an incline.  She also endorses a morning cough that is dry.  She denies wheezing.  She is taking Breo Ellipta 1 puff daily, and she feels that this helps her breathing.  She restarted Breo because her pharmacy sent her a Breo refill instead of Symbicort, and she is tolerating it better now than she did before because she targets it towards the back of her throat instead of into her mouth.  Her breathing felt tight this morning, but it improved after she took her Breo Ellipta.  She also takes montelukast daily.  She reports that her joints are under good control.  She currently takes leflunomide, turmeric, and low-dose prednisone.  She usually does not get the flu vaccine because she is allergic to many antibiotics, and she has discovered that there is an antibiotic in the flu vaccine.        Current Outpatient Medications   Medication     ACYCLOVIR PO     Ascorbic Acid (VITAMIN C PO)     calcium carbonate-vitamin D 600-200 MG-UNIT TABS     clotrimazole (LOTRIMIN) 1 % cream     estradiol (ESTRACE VAGINAL) 0.1 MG/GM cream     fluticasone-vilanterol (BREO ELLIPTA) 100-25 MCG/INH inhaler     glucosamine-chondroitin 500-400 MG CAPS per capsule     hydroxypropyl methylcellulose (GENTEAL) 0.2 % SOLN ophthalmic solution     leflunomide (ARAVA) 10 MG tablet     MELATONIN PO     montelukast (SINGULAIR) 10 MG tablet     pimecrolimus (ELIDEL) 1 % cream     PREDNISONE PO     RABEprazole (ACIPHEX) 20 MG EC tablet     rosuvastatin (CRESTOR) 5 MG tablet  "    UNABLE TO FIND     VITAMIN D, CHOLECALCIFEROL, PO     zinc gluconate 50 MG tablet     LACTOBACILLUS ACID-PECTIN PO     MAGNESIUM PO     order for DME     No current facility-administered medications for this visit.      Allergies   Allergen Reactions     Acetaminophen Anaphylaxis     Influenza Vaccines Anaphylaxis     Rituximab Hives and Itching     Other reaction(s): Breathing Difficulty     Cephalexin Hives     Amitriptyline      PN: LW Reaction: agitation, irritability     Azithromycin Other (See Comments)     \"systemic organ failure\"     Cephalosporins Hives     Cimetidine      PN: LW Reaction: GI Upset, vomiting     Codeine      PN: LW Reaction: Vomiting     Diazepam      hyperactive     Dronabinol Other (See Comments)     Other reaction(s): Headache  Nausea and vomiting     Fluoxetine Other (See Comments)     extreme agitation, cannot be combined for use with cipro     Metronidazole      Generalized illness     Metronidazole      Other reaction(s): Other - Describe In Comment Field  Extreme pain in legs     Miconazole      vaginal burning     Morphine Sulfate (Concentrate)      nausea and vomiting     Nitrofurantoin      Multiple organ failure     Nortriptyline Other (See Comments)     hyperactivity     Omeprazole      PN: LW Reaction: GI Upset     Thimerosal Other (See Comments)     Severe buring, lupus flare     Tioconazole Other (See Comments)     Vaginal burning and bleeding     Erythromycin Rash     malaria  type reaction - fever and hair fell out     Iodine Rash     Needs to have Betadine washed off aftr surgery     Latex Rash     Other reaction(s): Other  redness     Levofloxacin Rash     PN: LW Reaction: Unknown Reaction     Quinolones Rash     happened with levaquin and Ciprofloxacin     Sulfa Drugs Rash     Tetracycline Rash     Past Medical History:   Diagnosis Date     Adverse effect of other specified systemic anti-infectives and antiparasitics, subsequent encounter      Anterior corneal " dystrophy      Blepharitis      Bronchiolitis     small airways disease probably related to SLE     Bronchiolitis      Coronary artery disease      Cortical age-related cataract of both eyes      Disseminated retinitis and retinochoroiditis, pigment epitheliopathy      Diverticulitis of colon      GERD (gastroesophageal reflux disease)      Heart murmur     mitral valve disorder     Hypersomnia      ILD (interstitial lung disease) (H)     Mild ILD on chest CT, probably related to SLE     Keratopathy      MGD (meibomian gland dysfunction)      Osteopenia      Pericarditis     due to SLE, s/p pericardial window 2006     Plaquenil causing adverse effect in therapeutic use      Recurrent colitis due to Clostridium difficile      Reflux esophagitis      Restrictive lung disease      SLE (systemic lupus erythematosus related syndrome) (H)     dx in 20s; arthritis, serositis (pericarditis and pleuritis)     SLE (systemic lupus erythematosus related syndrome) (H)      Tracheostomy in place (H)        Past Surgical History:   Procedure Laterality Date     ARTHROPLASTY KNEE Right 10/2/2018    Procedure: ARTHROPLASTY KNEE;  RIGHT TOTAL KNEE ARTHROPLASTY ;  Surgeon: Kelsie Hardin MD;  Location: [facility] OR     CHOLECYSTECTOMY  04/2004     GYN SURGERY  04/2001    LEEP     GYN SURGERY  1985    removal of uterine fibroids     ORTHOPEDIC SURGERY Right     knee cartilage     ORTHOPEDIC SURGERY Left 2005    foot surgery     ORTHOPEDIC SURGERY Left     knee meniscus     ORTHOPEDIC SURGERY Left     thumb     ORTHOPEDIC SURGERY Right 2008    ulnar release     ORTHOPEDIC SURGERY Left 2017    ulnar release     THORACIC SURGERY      periocardiocentesis       Social History     Socioeconomic History     Marital status:      Spouse name: Not on file     Number of children: Not on file     Years of education: Not on file     Highest education level: Not on file   Occupational History     Not on file   Social Needs     Financial  "resource strain: Not on file     Food insecurity:     Worry: Not on file     Inability: Not on file     Transportation needs:     Medical: Not on file     Non-medical: Not on file   Tobacco Use     Smoking status: Never Smoker     Smokeless tobacco: Never Used   Substance and Sexual Activity     Alcohol use: Not on file     Drug use: Not on file     Sexual activity: Not on file   Lifestyle     Physical activity:     Days per week: Not on file     Minutes per session: Not on file     Stress: Not on file   Relationships     Social connections:     Talks on phone: Not on file     Gets together: Not on file     Attends Anabaptism service: Not on file     Active member of club or organization: Not on file     Attends meetings of clubs or organizations: Not on file     Relationship status: Not on file     Intimate partner violence:     Fear of current or ex partner: Not on file     Emotionally abused: Not on file     Physically abused: Not on file     Forced sexual activity: Not on file   Other Topics Concern     Not on file   Social History Narrative    .  Had exposure to second hand tobacco smoke as a child.    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    Vitals: BP (!) 146/86   Pulse 92   Resp 17   Ht 1.549 m (5' 1\")   Wt 49.9 kg (110 lb)   SpO2 100%   BMI 20.78 kg/m      Exam:   GENERAL APPEARANCE: Well developed, " well nourished, alert, and in no apparent distress.  RESP: good air flow throughout.  Few bibasilar inspiratory crackles. No rhonchi. No wheezes. No inspiratory squeaks.  CV: Normal S1, S2, regular rhythm, normal rate. No murmur.  No LE edema.   MS:  Extremities normal. No clubbing. No cyanosis.  SKIN: No rash on limited exam.  NEURO: Mentation intact, speech normal, normal gait and stance.  PSYCH: mentation appears normal. and affect normal/bright      Results:  Recent Results (from the past 168 hour(s))   General PFT Lab (Please always keep checked)    Collection Time: 09/12/19 10:19 AM   Result Value Ref Range    FVC-Pred 2.64 L    FVC-Pre 1.96 L    FVC-%Pred-Pre 74 %    FEV1-Pre 1.64 L    FEV1-%Pred-Pre 79 %    FEV1FVC-Pred 79 %    FEV1FVC-Pre 84 %    FEFMax-Pred 5.41 L/sec    FEFMax-Pre 6.82 L/sec    FEFMax-%Pred-Pre 125 %    FEF2575-Pred 1.85 L/sec    FEF2575-Pre 2.05 L/sec    SRE3479-%Pred-Pre 110 %    ExpTime-Pre 5.02 sec    FIFMax-Pre 5.56 L/sec    VC-Pred 2.74 L    VC-Pre 2.01 L    VC-%Pred-Pre 73 %    IC-Pred 1.90 L    IC-Pre 1.64 L    IC-%Pred-Pre 86 %    ERV-Pred 0.84 L    ERV-Pre 0.37 L    ERV-%Pred-Pre 43 %    FEV1FEV6-Pred 80 %    FEV1FEV6-Pre 84 %    FRCPleth-Pred 2.54 L    FRCPleth-Pre 1.81 L    FRCPleth-%Pred-Pre 71 %    RVPleth-Pred 1.88 L    RVPleth-Pre 1.44 L    RVPleth-%Pred-Pre 76 %    TLCPleth-Pred 4.44 L    TLCPleth-Pre 3.45 L    TLCPleth-%Pred-Pre 77 %    DLCOunc-Pred 17.73 ml/min/mmHg    DLCOunc-Pre 13.11 ml/min/mmHg    DLCOunc-%Pred-Pre 73 %    VA-Pre 3.05 L    VA-%Pred-Pre 73 %    FEV1SVC-Pred 76 %    FEV1SVC-Pre 82 %       I reviewed pulmonary function test that was performed today.  This shows normal spirometry, lung volumes, and diffusion.  Lung function is stable.  I reviewed results with the patient.      Assessment and plan:   Ms. Bahskar Pyle is a 69-year-old who is here to follow-up mild ILD and bronchiolitis due to lupus.   1.  Bronchiolitis.  She has mild dyspnea on exertion,  but Breo helps.  We will continue Breo Ellipta 1 puff daily.  I advised her again to rinse her mouth out with water after use.  She is going to have a left total knee replacement on October 1, followed by physical therapy.  She should continue Breo Ellipta and montelukast.  No azithromycin due to past adverse reaction (although the report sounds more like a UTI with sepsis).  She will return in 6 months with full PFT.  2.  ILD.  She has mild ILD by chest CT, probably related to lupus.  No specific treatment is necessary at this time as her PFT is stable.  She did not tolerate mycophenolate in the past due to GI side effects.  She did not tolerate rituximab in the past because of infusion reaction (tightness and burning in the throat).  If her ILD worsens and becomes fibrotic in the future, then nintedanib might be an option if the INBUILD study results are positive.

## 2019-09-30 RX ORDER — TURMERIC ROOT EXTRACT 500 MG
500 TABLET ORAL EVERY MORNING
COMMUNITY

## 2019-09-30 RX ORDER — GABAPENTIN 100 MG/1
300 CAPSULE ORAL AT BEDTIME
COMMUNITY

## 2019-10-01 ENCOUNTER — ANESTHESIA EVENT (OUTPATIENT)
Dept: SURGERY | Facility: CLINIC | Age: 69
End: 2019-10-01
Payer: COMMERCIAL

## 2019-10-01 ENCOUNTER — APPOINTMENT (OUTPATIENT)
Dept: GENERAL RADIOLOGY | Facility: CLINIC | Age: 69
End: 2019-10-01
Attending: PHYSICIAN ASSISTANT
Payer: COMMERCIAL

## 2019-10-01 ENCOUNTER — HOSPITAL ENCOUNTER (OUTPATIENT)
Facility: CLINIC | Age: 69
Discharge: HOME | End: 2019-10-02
Attending: ORTHOPAEDIC SURGERY | Admitting: ORTHOPAEDIC SURGERY
Payer: COMMERCIAL

## 2019-10-01 ENCOUNTER — APPOINTMENT (OUTPATIENT)
Dept: PHYSICAL THERAPY | Facility: CLINIC | Age: 69
End: 2019-10-01
Attending: ORTHOPAEDIC SURGERY
Payer: COMMERCIAL

## 2019-10-01 ENCOUNTER — ANESTHESIA (OUTPATIENT)
Dept: SURGERY | Facility: CLINIC | Age: 69
End: 2019-10-01
Payer: COMMERCIAL

## 2019-10-01 DIAGNOSIS — Z96.652 STATUS POST TOTAL LEFT KNEE REPLACEMENT: Primary | ICD-10-CM

## 2019-10-01 PROCEDURE — 25800025 ZZH RX 258: Performed by: ORTHOPAEDIC SURGERY

## 2019-10-01 PROCEDURE — 27810169 ZZH OR IMPLANT GENERAL: Performed by: ORTHOPAEDIC SURGERY

## 2019-10-01 PROCEDURE — 37000009 ZZH ANESTHESIA TECHNICAL FEE, EACH ADDTL 15 MIN: Performed by: ORTHOPAEDIC SURGERY

## 2019-10-01 PROCEDURE — 25800030 ZZH RX IP 258 OP 636: Performed by: PHYSICIAN ASSISTANT

## 2019-10-01 PROCEDURE — 71000012 ZZH RECOVERY PHASE 1 LEVEL 1 FIRST HR: Performed by: ORTHOPAEDIC SURGERY

## 2019-10-01 PROCEDURE — 99232 SBSQ HOSP IP/OBS MODERATE 35: CPT | Performed by: PHYSICIAN ASSISTANT

## 2019-10-01 PROCEDURE — 36000093 ZZH SURGERY LEVEL 4 1ST 30 MIN: Performed by: ORTHOPAEDIC SURGERY

## 2019-10-01 PROCEDURE — 99207 ZZC CONSULT E&M CHANGED TO SUBSEQUENT LEVEL: CPT | Performed by: PHYSICIAN ASSISTANT

## 2019-10-01 PROCEDURE — 25800030 ZZH RX IP 258 OP 636: Performed by: NURSE ANESTHETIST, CERTIFIED REGISTERED

## 2019-10-01 PROCEDURE — 25800030 ZZH RX IP 258 OP 636: Performed by: ORTHOPAEDIC SURGERY

## 2019-10-01 PROCEDURE — 36000063 ZZH SURGERY LEVEL 4 EA 15 ADDTL MIN: Performed by: ORTHOPAEDIC SURGERY

## 2019-10-01 PROCEDURE — 25000125 ZZHC RX 250: Performed by: ORTHOPAEDIC SURGERY

## 2019-10-01 PROCEDURE — 40000169 ZZH STATISTIC PRE-PROCEDURE ASSESSMENT I: Performed by: ORTHOPAEDIC SURGERY

## 2019-10-01 PROCEDURE — 97161 PT EVAL LOW COMPLEX 20 MIN: CPT | Mod: GP | Performed by: PHYSICAL THERAPIST

## 2019-10-01 PROCEDURE — 97110 THERAPEUTIC EXERCISES: CPT | Mod: GP | Performed by: PHYSICAL THERAPIST

## 2019-10-01 PROCEDURE — C1776 JOINT DEVICE (IMPLANTABLE): HCPCS | Performed by: ORTHOPAEDIC SURGERY

## 2019-10-01 PROCEDURE — 25800030 ZZH RX IP 258 OP 636: Performed by: ANESTHESIOLOGY

## 2019-10-01 PROCEDURE — 37000008 ZZH ANESTHESIA TECHNICAL FEE, 1ST 30 MIN: Performed by: ORTHOPAEDIC SURGERY

## 2019-10-01 PROCEDURE — 25000132 ZZH RX MED GY IP 250 OP 250 PS 637: Performed by: PHYSICIAN ASSISTANT

## 2019-10-01 PROCEDURE — 12000000 ZZH R&B MED SURG/OB

## 2019-10-01 PROCEDURE — 25000128 H RX IP 250 OP 636: Performed by: ORTHOPAEDIC SURGERY

## 2019-10-01 PROCEDURE — 71000013 ZZH RECOVERY PHASE 1 LEVEL 1 EA ADDTL HR: Performed by: ORTHOPAEDIC SURGERY

## 2019-10-01 PROCEDURE — 25000128 H RX IP 250 OP 636: Performed by: PHYSICIAN ASSISTANT

## 2019-10-01 PROCEDURE — 25000125 ZZHC RX 250: Performed by: NURSE ANESTHETIST, CERTIFIED REGISTERED

## 2019-10-01 PROCEDURE — 27210794 ZZH OR GENERAL SUPPLY STERILE: Performed by: ORTHOPAEDIC SURGERY

## 2019-10-01 PROCEDURE — 25000128 H RX IP 250 OP 636: Performed by: NURSE ANESTHETIST, CERTIFIED REGISTERED

## 2019-10-01 PROCEDURE — 25000128 H RX IP 250 OP 636: Performed by: ANESTHESIOLOGY

## 2019-10-01 PROCEDURE — 25000125 ZZHC RX 250: Performed by: PHYSICIAN ASSISTANT

## 2019-10-01 PROCEDURE — 97116 GAIT TRAINING THERAPY: CPT | Mod: GP | Performed by: PHYSICAL THERAPIST

## 2019-10-01 PROCEDURE — 40000985 XR KNEE PORT LT 1/2 VW: Mod: LT

## 2019-10-01 DEVICE — BONE CEMENT STRK SIMPLEX P SPEEDSET 6192-1-001: Type: IMPLANTABLE DEVICE | Site: KNEE | Status: FUNCTIONAL

## 2019-10-01 DEVICE — IMP COMP TIBTRY SGM 2.5 KN COCR 1581-25-000: Type: IMPLANTABLE DEVICE | Site: KNEE | Status: FUNCTIONAL

## 2019-10-01 DEVICE — IMP COMP PATELLA DEPUY 3 POST RND 35MM 960111: Type: IMPLANTABLE DEVICE | Site: KNEE | Status: FUNCTIONAL

## 2019-10-01 DEVICE — IMPLANTABLE DEVICE: Type: IMPLANTABLE DEVICE | Site: KNEE | Status: FUNCTIONAL

## 2019-10-01 RX ORDER — PROPOFOL 10 MG/ML
INJECTION, EMULSION INTRAVENOUS CONTINUOUS PRN
Status: DISCONTINUED | OUTPATIENT
Start: 2019-10-01 | End: 2019-10-01

## 2019-10-01 RX ORDER — GABAPENTIN 300 MG/1
300 CAPSULE ORAL AT BEDTIME
Status: DISCONTINUED | OUTPATIENT
Start: 2019-10-01 | End: 2019-10-02 | Stop reason: HOSPADM

## 2019-10-01 RX ORDER — EPHEDRINE SULFATE 50 MG/ML
INJECTION, SOLUTION INTRAMUSCULAR; INTRAVENOUS; SUBCUTANEOUS PRN
Status: DISCONTINUED | OUTPATIENT
Start: 2019-10-01 | End: 2019-10-01

## 2019-10-01 RX ORDER — FENTANYL CITRATE 50 UG/ML
25-50 INJECTION, SOLUTION INTRAMUSCULAR; INTRAVENOUS
Status: DISCONTINUED | OUTPATIENT
Start: 2019-10-01 | End: 2019-10-01 | Stop reason: HOSPADM

## 2019-10-01 RX ORDER — OXYCODONE HYDROCHLORIDE 5 MG/1
5 TABLET ORAL EVERY 4 HOURS PRN
Status: DISCONTINUED | OUTPATIENT
Start: 2019-10-01 | End: 2019-10-02 | Stop reason: HOSPADM

## 2019-10-01 RX ORDER — ROSUVASTATIN CALCIUM 5 MG/1
5 TABLET, COATED ORAL SEE ADMIN INSTRUCTIONS
Status: DISCONTINUED | OUTPATIENT
Start: 2019-10-03 | End: 2019-10-02 | Stop reason: HOSPADM

## 2019-10-01 RX ORDER — LEFLUNOMIDE 10 MG/1
10 TABLET ORAL EVERY MORNING
Status: DISCONTINUED | OUTPATIENT
Start: 2019-10-01 | End: 2019-10-01

## 2019-10-01 RX ORDER — FENTANYL CITRATE 50 UG/ML
50 INJECTION, SOLUTION INTRAMUSCULAR; INTRAVENOUS EVERY 5 MIN PRN
Status: DISCONTINUED | OUTPATIENT
Start: 2019-10-01 | End: 2019-10-01 | Stop reason: HOSPADM

## 2019-10-01 RX ORDER — AMOXICILLIN 250 MG
2 CAPSULE ORAL 2 TIMES DAILY
Status: DISCONTINUED | OUTPATIENT
Start: 2019-10-01 | End: 2019-10-02 | Stop reason: HOSPADM

## 2019-10-01 RX ORDER — AMLODIPINE BESYLATE 5 MG/1
5 TABLET ORAL DAILY PRN
COMMUNITY

## 2019-10-01 RX ORDER — MONTELUKAST SODIUM 10 MG/1
10 TABLET ORAL AT BEDTIME
Status: DISCONTINUED | OUTPATIENT
Start: 2019-10-01 | End: 2019-10-02 | Stop reason: HOSPADM

## 2019-10-01 RX ORDER — FENTANYL CITRATE 50 UG/ML
INJECTION, SOLUTION INTRAMUSCULAR; INTRAVENOUS PRN
Status: DISCONTINUED | OUTPATIENT
Start: 2019-10-01 | End: 2019-10-01

## 2019-10-01 RX ORDER — MAGNESIUM HYDROXIDE 1200 MG/15ML
LIQUID ORAL PRN
Status: DISCONTINUED | OUTPATIENT
Start: 2019-10-01 | End: 2019-10-01 | Stop reason: HOSPADM

## 2019-10-01 RX ORDER — HYDROMORPHONE HYDROCHLORIDE 1 MG/ML
.3-.5 INJECTION, SOLUTION INTRAMUSCULAR; INTRAVENOUS; SUBCUTANEOUS
Status: DISCONTINUED | OUTPATIENT
Start: 2019-10-01 | End: 2019-10-02 | Stop reason: HOSPADM

## 2019-10-01 RX ORDER — ONDANSETRON 4 MG/1
4 TABLET, ORALLY DISINTEGRATING ORAL EVERY 6 HOURS PRN
Status: DISCONTINUED | OUTPATIENT
Start: 2019-10-01 | End: 2019-10-02 | Stop reason: HOSPADM

## 2019-10-01 RX ORDER — CLINDAMYCIN PHOSPHATE 900 MG/50ML
900 INJECTION, SOLUTION INTRAVENOUS EVERY 8 HOURS
Status: COMPLETED | OUTPATIENT
Start: 2019-10-01 | End: 2019-10-02

## 2019-10-01 RX ORDER — ONDANSETRON 2 MG/ML
4 INJECTION INTRAMUSCULAR; INTRAVENOUS EVERY 6 HOURS PRN
Status: DISCONTINUED | OUTPATIENT
Start: 2019-10-01 | End: 2019-10-02 | Stop reason: HOSPADM

## 2019-10-01 RX ORDER — LEFLUNOMIDE 10 MG/1
10 TABLET ORAL EVERY MORNING
Status: DISCONTINUED | OUTPATIENT
Start: 2019-10-02 | End: 2019-10-02 | Stop reason: HOSPADM

## 2019-10-01 RX ORDER — SODIUM CHLORIDE, SODIUM LACTATE, POTASSIUM CHLORIDE, CALCIUM CHLORIDE 600; 310; 30; 20 MG/100ML; MG/100ML; MG/100ML; MG/100ML
INJECTION, SOLUTION INTRAVENOUS CONTINUOUS
Status: DISCONTINUED | OUTPATIENT
Start: 2019-10-01 | End: 2019-10-02 | Stop reason: HOSPADM

## 2019-10-01 RX ORDER — PROPOFOL 10 MG/ML
INJECTION, EMULSION INTRAVENOUS PRN
Status: DISCONTINUED | OUTPATIENT
Start: 2019-10-01 | End: 2019-10-01

## 2019-10-01 RX ORDER — NALOXONE HYDROCHLORIDE 0.4 MG/ML
.1-.4 INJECTION, SOLUTION INTRAMUSCULAR; INTRAVENOUS; SUBCUTANEOUS
Status: DISCONTINUED | OUTPATIENT
Start: 2019-10-01 | End: 2019-10-02 | Stop reason: HOSPADM

## 2019-10-01 RX ORDER — ACYCLOVIR 400 MG/1
400 TABLET ORAL EVERY MORNING
Status: DISCONTINUED | OUTPATIENT
Start: 2019-10-01 | End: 2019-10-02 | Stop reason: HOSPADM

## 2019-10-01 RX ORDER — ESTRADIOL 10 UG/1
10 INSERT VAGINAL WEEKLY
COMMUNITY

## 2019-10-01 RX ORDER — ONDANSETRON 2 MG/ML
INJECTION INTRAMUSCULAR; INTRAVENOUS PRN
Status: DISCONTINUED | OUTPATIENT
Start: 2019-10-01 | End: 2019-10-01

## 2019-10-01 RX ORDER — SODIUM CHLORIDE, SODIUM LACTATE, POTASSIUM CHLORIDE, CALCIUM CHLORIDE 600; 310; 30; 20 MG/100ML; MG/100ML; MG/100ML; MG/100ML
INJECTION, SOLUTION INTRAVENOUS CONTINUOUS
Status: DISCONTINUED | OUTPATIENT
Start: 2019-10-01 | End: 2019-10-01 | Stop reason: HOSPADM

## 2019-10-01 RX ORDER — PREDNISONE 1 MG/1
1 TABLET ORAL
Status: DISCONTINUED | OUTPATIENT
Start: 2019-10-02 | End: 2019-10-02 | Stop reason: HOSPADM

## 2019-10-01 RX ORDER — NALOXONE HYDROCHLORIDE 0.4 MG/ML
.1-.4 INJECTION, SOLUTION INTRAMUSCULAR; INTRAVENOUS; SUBCUTANEOUS
Status: ACTIVE | OUTPATIENT
Start: 2019-10-01 | End: 2019-10-02

## 2019-10-01 RX ORDER — METHYLPREDNISOLONE SODIUM SUCCINATE 125 MG/2ML
INJECTION, POWDER, LYOPHILIZED, FOR SOLUTION INTRAMUSCULAR; INTRAVENOUS PRN
Status: DISCONTINUED | OUTPATIENT
Start: 2019-10-01 | End: 2019-10-01

## 2019-10-01 RX ORDER — CLINDAMYCIN PHOSPHATE 900 MG/50ML
900 INJECTION, SOLUTION INTRAVENOUS EVERY 8 HOURS
Status: DISCONTINUED | OUTPATIENT
Start: 2019-10-01 | End: 2019-10-01 | Stop reason: HOSPADM

## 2019-10-01 RX ORDER — LIDOCAINE 40 MG/G
CREAM TOPICAL
Status: DISCONTINUED | OUTPATIENT
Start: 2019-10-01 | End: 2019-10-02 | Stop reason: HOSPADM

## 2019-10-01 RX ORDER — DEXAMETHASONE SODIUM PHOSPHATE 4 MG/ML
INJECTION, SOLUTION INTRA-ARTICULAR; INTRALESIONAL; INTRAMUSCULAR; INTRAVENOUS; SOFT TISSUE PRN
Status: DISCONTINUED | OUTPATIENT
Start: 2019-10-01 | End: 2019-10-01

## 2019-10-01 RX ORDER — TRIAMCINOLONE ACETONIDE 1 MG/G
CREAM TOPICAL 2 TIMES DAILY PRN
COMMUNITY

## 2019-10-01 RX ORDER — ONDANSETRON 4 MG/1
4 TABLET, ORALLY DISINTEGRATING ORAL EVERY 30 MIN PRN
Status: DISCONTINUED | OUTPATIENT
Start: 2019-10-01 | End: 2019-10-01 | Stop reason: HOSPADM

## 2019-10-01 RX ORDER — LIDOCAINE HYDROCHLORIDE 20 MG/ML
INJECTION, SOLUTION INFILTRATION; PERINEURAL PRN
Status: DISCONTINUED | OUTPATIENT
Start: 2019-10-01 | End: 2019-10-01

## 2019-10-01 RX ORDER — PANTOPRAZOLE SODIUM 20 MG/1
20 TABLET, DELAYED RELEASE ORAL AT BEDTIME
Status: DISCONTINUED | OUTPATIENT
Start: 2019-10-01 | End: 2019-10-02 | Stop reason: HOSPADM

## 2019-10-01 RX ORDER — AMOXICILLIN 250 MG
1 CAPSULE ORAL 2 TIMES DAILY
Status: DISCONTINUED | OUTPATIENT
Start: 2019-10-01 | End: 2019-10-02 | Stop reason: HOSPADM

## 2019-10-01 RX ORDER — HYDROMORPHONE HYDROCHLORIDE 1 MG/ML
.3-.5 INJECTION, SOLUTION INTRAMUSCULAR; INTRAVENOUS; SUBCUTANEOUS EVERY 5 MIN PRN
Status: DISCONTINUED | OUTPATIENT
Start: 2019-10-01 | End: 2019-10-01 | Stop reason: HOSPADM

## 2019-10-01 RX ORDER — ONDANSETRON 2 MG/ML
4 INJECTION INTRAMUSCULAR; INTRAVENOUS EVERY 30 MIN PRN
Status: DISCONTINUED | OUTPATIENT
Start: 2019-10-01 | End: 2019-10-01 | Stop reason: HOSPADM

## 2019-10-01 RX ADMIN — CLINDAMYCIN PHOSPHATE 900 MG: 18 INJECTION, SOLUTION INTRAVENOUS at 09:43

## 2019-10-01 RX ADMIN — PROPOFOL 40 MG: 10 INJECTION, EMULSION INTRAVENOUS at 10:24

## 2019-10-01 RX ADMIN — FENTANYL CITRATE 25 MCG: 50 INJECTION, SOLUTION INTRAMUSCULAR; INTRAVENOUS at 09:33

## 2019-10-01 RX ADMIN — ONDANSETRON 4 MG: 2 INJECTION INTRAMUSCULAR; INTRAVENOUS at 09:45

## 2019-10-01 RX ADMIN — PROPOFOL 50 MCG/KG/MIN: 10 INJECTION, EMULSION INTRAVENOUS at 09:46

## 2019-10-01 RX ADMIN — SENNOSIDES AND DOCUSATE SODIUM 2 TABLET: 8.6; 5 TABLET ORAL at 21:24

## 2019-10-01 RX ADMIN — Medication 10 MG: at 10:03

## 2019-10-01 RX ADMIN — CLINDAMYCIN PHOSPHATE 900 MG: 900 INJECTION, SOLUTION INTRAVENOUS at 18:06

## 2019-10-01 RX ADMIN — PANTOPRAZOLE SODIUM 20 MG: 20 TABLET, DELAYED RELEASE ORAL at 21:24

## 2019-10-01 RX ADMIN — SODIUM CHLORIDE, POTASSIUM CHLORIDE, SODIUM LACTATE AND CALCIUM CHLORIDE: 600; 310; 30; 20 INJECTION, SOLUTION INTRAVENOUS at 09:40

## 2019-10-01 RX ADMIN — HYDROMORPHONE HYDROCHLORIDE 0.3 MG: 1 INJECTION, SOLUTION INTRAMUSCULAR; INTRAVENOUS; SUBCUTANEOUS at 15:08

## 2019-10-01 RX ADMIN — BUPIVACAINE HYDROCHLORIDE 30 ML GIVEN: 5 INJECTION, SOLUTION EPIDURAL; INTRACAUDAL; PERINEURAL at 08:58

## 2019-10-01 RX ADMIN — MIDAZOLAM HYDROCHLORIDE 0.5 MG: 1 INJECTION, SOLUTION INTRAMUSCULAR; INTRAVENOUS at 09:03

## 2019-10-01 RX ADMIN — SODIUM CHLORIDE 1 G: 9 INJECTION, SOLUTION INTRAVENOUS at 09:46

## 2019-10-01 RX ADMIN — FENTANYL CITRATE 25 MCG: 50 INJECTION INTRAMUSCULAR; INTRAVENOUS at 09:03

## 2019-10-01 RX ADMIN — OXYCODONE HYDROCHLORIDE 5 MG: 5 TABLET ORAL at 20:16

## 2019-10-01 RX ADMIN — METHYLPREDNISOLONE 125 MG: 125 INJECTION, POWDER, LYOPHILIZED, FOR SOLUTION INTRAMUSCULAR; INTRAVENOUS at 09:46

## 2019-10-01 RX ADMIN — LIDOCAINE HYDROCHLORIDE 40 MG: 20 INJECTION, SOLUTION INFILTRATION; PERINEURAL at 09:45

## 2019-10-01 RX ADMIN — SODIUM CHLORIDE, POTASSIUM CHLORIDE, SODIUM LACTATE AND CALCIUM CHLORIDE: 600; 310; 30; 20 INJECTION, SOLUTION INTRAVENOUS at 09:01

## 2019-10-01 RX ADMIN — MONTELUKAST 10 MG: 10 TABLET, FILM COATED ORAL at 21:24

## 2019-10-01 RX ADMIN — PROPOFOL 40 MG: 10 INJECTION, EMULSION INTRAVENOUS at 09:46

## 2019-10-01 RX ADMIN — OXYCODONE HYDROCHLORIDE 5 MG: 5 TABLET ORAL at 16:13

## 2019-10-01 RX ADMIN — GABAPENTIN 300 MG: 300 CAPSULE ORAL at 21:25

## 2019-10-01 RX ADMIN — SODIUM CHLORIDE, POTASSIUM CHLORIDE, SODIUM LACTATE AND CALCIUM CHLORIDE: 600; 310; 30; 20 INJECTION, SOLUTION INTRAVENOUS at 14:20

## 2019-10-01 RX ADMIN — SODIUM CHLORIDE, POTASSIUM CHLORIDE, SODIUM LACTATE AND CALCIUM CHLORIDE: 600; 310; 30; 20 INJECTION, SOLUTION INTRAVENOUS at 09:28

## 2019-10-01 RX ADMIN — DEXAMETHASONE SODIUM PHOSPHATE 4 MG: 4 INJECTION, SOLUTION INTRA-ARTICULAR; INTRALESIONAL; INTRAMUSCULAR; INTRAVENOUS; SOFT TISSUE at 09:45

## 2019-10-01 RX ADMIN — PHENYLEPHRINE HYDROCHLORIDE 200 MCG: 10 INJECTION INTRAVENOUS at 10:17

## 2019-10-01 ASSESSMENT — LIFESTYLE VARIABLES: TOBACCO_USE: 0

## 2019-10-01 ASSESSMENT — ACTIVITIES OF DAILY LIVING (ADL)
ADLS_ACUITY_SCORE: 11
ADLS_ACUITY_SCORE: 11

## 2019-10-01 ASSESSMENT — MIFFLIN-ST. JEOR: SCORE: 980.41

## 2019-10-01 NOTE — BRIEF OP NOTE
[facility]    Brief Operative Note    Pre-operative diagnosis: DJD LEFT KNEE  Post-operative diagnosis SAME   Procedure: Procedure(s):  EXAM UNDER ANESTHEISIA  AND LEFT TOTAL KNEE ARTHROPLASTY  Surgeon: Surgeon(s) and Role:     * Kelsie Hardin MD - Primary     * Mell Espinosa PA-C - Assisting  Anesthesia: Spinal   Estimated blood loss: 20 cc  Drains: None  Specimens: * No specimens in log *  Findings:   See opeative report.  Complications: None.  Implants:    Implant Name Type Inv. Item Serial No.  Lot No. LRB No. Used   BONE CEMENT RADIOPAQUE SIMPLEX P SPEEDSET 6192-1-001 Cement, Bone BONE CEMENT RADIOPAQUE SIMPLEX P SPEEDSET 6192-1-001  [employer] [id number] Left 2   PFC SIGMA TIBIAL INSERT FIXED BEARING TC3 SIZE 2.5 / 10MM    Depuy [id number] Left 1   PFC SIGMA MODULAR STEM 13MM X 60MM    Depuy [id number] Left 1   IMP COMP TIBTRY SGM 2.5 KN COCR 1581- Total Joint Component/Insert IMP COMP TIBTRY SGM 2.5 KN COCR 1581-  [employer]C [id number] Left 1   PFC SIGMA FEMORAL ADAPTER 5 DEGREE    Depuy [id number] Left 1   PFC SIGMA FEMORAL ADAPTIER BOLT    Depuy [id number] Left 1   IMP COMP PATELLA DEPUY 3 POST RND 35MM [id number] Total Joint Component/Insert IMP COMP PATELLA DEPUY 3 POST RND 35MM [id number]  [employer]-   [id number] Left 1   PFC SIGMA MODULAR STEM 13MM X 30MM    Depuy [id number] Left 1   SIGMA FEMORAL TC3 SIZE 2.5 LEFT    Depuy [id number] Left 1

## 2019-10-01 NOTE — PLAN OF CARE
Patient plan: Per pt, home with OP PT    Current status: PT orders received, eval completed, treatment initiated. Pt is a 70yo female seen POD#1 s/p L TKA, WBAT. Pt lives with spouse in a house with no steps to enter, all needs met on main level with tub/shower. Pt IND without AD for mobility at baseline.    Noting trace contraction of L anterior tibialis, unable to actively dorsiflex L foot. Pt rates pain 2-4/10. VSS on RA. Pt participated in L TKA exercises and tolerated well. Pt transfers supine<>sit with SBA. Pt transfers sit<>stand to FWW with Sam. Pt ambulated 40' with FWW and CGA. L knee AAROM 8-93 degrees.    Anticipated status at discharge: supervision for mobility

## 2019-10-01 NOTE — ANESTHESIA CARE TRANSFER NOTE
Patient: Saira Pyle    Procedure(s):  EXAM UNDER ANESTHEISIA  AND LEFT TOTAL KNEE ARTHROPLASTY    Diagnosis: DJD LEFT KNEE  Diagnosis Additional Information: No value filed.    Anesthesia Type:   Spinal, For Post-op pain in coordination with surgeon     Note:  Airway :Room Air  Patient transferred to:PACU  Comments: At end of procedure, spontaneous respirations, patient alert to voice, able to follow commands. Patient breathing room air at room air to PACU. Oxygen tubing connected to wall O2 in PACU, SpO2, NiBP, and EKG monitors and alarms on and functioning, Caitlyn Hugger warmer connected to patient gown, report on patient's clinical status given to PACU RN, RN questions answered.Handoff Report: Identifed the Patient, Identified the Reponsible Provider, Reviewed the pertinent medical history, Discussed the surgical course, Reviewed Intra-OP anesthesia mangement and issues during anesthesia, Set expectations for post-procedure period and Allowed opportunity for questions and acknowledgement of understanding      Vitals: (Last set prior to Anesthesia Care Transfer)    CRNA VITALS  10/1/2019 1018 - 10/1/2019 1056      10/1/2019             Resp Rate (set):  10                Electronically Signed By: ZIGGY Gallegos CRNA  October 1, 2019  10:56 AM

## 2019-10-01 NOTE — ANESTHESIA PROCEDURE NOTES
Peripheral nerve/Neuraxial procedure note : Femoral and Adductor canal  Pre-Procedure  Performed by Jas Diaz MD  Location: pre-op      Pre-Anesthestic Checklist: patient identified, IV checked, site marked, risks and benefits discussed, informed consent, monitors and equipment checked, pre-op evaluation, at physician/surgeon's request and post-op pain management    Timeout  Correct Patient: Yes   Correct Procedure: Yes   Correct Site: Yes   Correct Laterality: Yes   Correct Position: Yes   Site Marked: Yes   .   Procedure Documentation    .    Procedure: Femoral and Adductor canal, left.   Patient Position:supine   Ultrasound used to identify targeted nerve, plexus, or vascular marker and placed a needle adjacent to it., Ultrasound was used to visualize the spread of the anesthetic in close proximity to the above stated nerve. A permanent image is entered into the patient's record.  Patient Prep/Sterile Barriers; mask, chlorhexidine gluconate and isopropyl alcohol.  .  Needle: short bevel, insulated   Needle Gauge: 21.  Needle Length (millimeters) 80  .        Assessment/Narrative  Paresthesias: No.  .  The placement was negative for: blood aspirated, painful injection and site bleeding.  . Comments:  30 cc of 0.5% Bupivacaine with epinephrine (1:400K) injected on the anterior side of the femoral artery, in close proximity to the femoral nerve branches via the adductor canal approach.    Pt tolerated well.    No complications.      The surgeon has given a verbal order transferring care of this patient to me for the performance of a regional analgesia block for post-op pain control. It is requested of me because I am uniquely trained and qualified to perform this block and the surgeon is neither trained nor qualified to perform this procedure.

## 2019-10-01 NOTE — PROVIDER NOTIFICATION
"Notified on-call ortho MD regarding patient unable to dorsiflex L foot, and PTs note \"trace contraction of L anterior tibialis\", some numbness and tingling still from surgery, 2+ pulses. Advised to continue to monitor at this time.   "

## 2019-10-01 NOTE — CONSULTS
Consult Date:  10/01/2019      PRIMARY CARE PROVIDER:  Kye King MD      REQUESTING PHYSICIAN:  Dr. Hardin.      REASON FOR CONSULTATION:  Hospitalist consult.      HISTORY OF PRESENT ILLNESS:  Ms. Bhaskar Pyle is a 69-year-old female with past medical history of lupus and rheumatoid arthritis on DMARD and low-dose steroids, as well as previous history of TIA, C. diff and nonobstructive coronary artery disease who is admitted under the care of Orthopedics and is status post left total knee arthroplasty.  Procedure was performed by Dr. Hardin under general anesthesia.  The patient tolerated the procedure well with no significant complications.      Presently, the patient is evaluated in her hospital room.  Denies concerns or complaints.  Postoperative pain is well controlled.  No chest pain or shortness breath.  No nausea or vomiting.      PAST MEDICAL HISTORY:   1.  Lupus and rheumatoid arthritis maintained on DMARD and chronic prednisone.   2.  History of pericarditis status post pericardial window.   3.  History of TIA.   4.  History of C. diff.   5.  Nonobstructive coronary artery disease.   6.  Restrictive lung disease and bronchiectasis.      PRIOR TO ADMISSION MEDICATIONS:  Medications Prior to Admission   Medication Sig Dispense Refill Last Dose     acyclovir (ZOVIRAX) 400 MG tablet Take 400 mg by mouth every morning    9/30/2019 at am     amLODIPine (NORVASC) 5 MG tablet Take 5 mg by mouth daily as needed For Raynauds Syndrome. Used seasonally when temperature is below 40 degrees.   Over 6 months ago at prn     Ascorbic Acid (VITAMIN C PO) Take 500 mg by mouth every morning    9/30/2019 at am     calcium carbonate-vitamin D 600-200 MG-UNIT TABS Take 1 tablet by mouth daily    9/30/2019 at am     estradiol (ESTRACE VAGINAL) 0.1 MG/GM cream Place 1 g vaginally once a week    Over 1 week ago at hs     estradiol (VAGIFEM) 10 MCG TABS vaginal tablet Place 10 mcg vaginally once a week (with Estrace  cream.)   Over 1 week ago at hs     fluticasone-vilanterol (BREO ELLIPTA) 100-25 MCG/INH inhaler Inhale 1 puff into the lungs daily 1 Inhaler 11 10/1/2019 at 0530     gabapentin (NEURONTIN) 100 MG capsule Take 300 mg by mouth At Bedtime (Takes 3 x 100mg = 300mg)   9/30/2019 at 2100     glucosamine-chondroitin 500-400 MG CAPS per capsule Take 1 capsule by mouth daily   Over 2 weeks ago at am     hydroxypropyl methylcellulose (GENTEAL) 0.2 % SOLN ophthalmic solution Place 1 drop into both eyes At Bedtime    9/30/2019 at 2100     LACTOBACILLUS ACID-PECTIN PO Take 1 tablet by mouth daily   9/30/2019 at am     leflunomide (ARAVA) 10 MG tablet Take 10 mg by mouth every morning    9/30/2019 at am     melatonin 5 MG tablet Take 5 mg by mouth At Bedtime (Takes 2 x 5mg = 10mg)    9/30/2019 at 2100     montelukast (SINGULAIR) 10 MG tablet Take 1 tablet (10 mg) by mouth At Bedtime 30 tablet 11 9/30/2019 at 2100     pimecrolimus (ELIDEL) 1 % cream Apply topically daily as needed    Over 3 months ago at prn     predniSONE (DELTASONE) 1 MG tablet Take 1 mg by mouth daily (with breakfast)    10/1/2019 at 0530     RABEprazole (ACIPHEX) 20 MG EC tablet Take 20 mg by mouth At Bedtime    9/30/2019 at 2100     rosuvastatin (CRESTOR) 5 MG tablet Take 5 mg by mouth See Admin Instructions Takes 5 mg  for 2 consecutive days then 1 day off.   10/1/2019 at 0530     triamcinolone (KENALOG) 0.1 % external cream Apply topically 2 times daily as needed for irritation   Over 6 months ago at prn     Turmeric 500 MG TABS Take 500 mg by mouth every morning   Over 2 weeks ago at am     Vitamin D, Cholecalciferol, 1000 units CAPS Take 1,000 Units by mouth 2 times daily    9/30/2019 at 2100     zinc gluconate 50 MG tablet Take 50 mg by mouth daily    Over 2 weeks ago at am     order for DME Equipment being ordered: Walker Wheels () and Walker ()  Treatment Diagnosis: impaired gait (Patient not taking: Reported on 3/7/2019) 1 each 0 Not Taking         PAST SURGICAL HISTORY:  Extensive and reviewed in [facility].      FAMILY HISTORY:  Mother had osteoporosis.  Father had heart disease.      SOCIAL HISTORY:  She is a nonsmoker, drinks alcohol 1 drink per week.  , lives in [place].      REVIEW OF SYSTEMS:  A 10-point review of systems was completed.  Pertinent positives are noted in HPI, all other systems negative.      PHYSICAL EXAMINATION:   GENERAL:  Saira Pyle is a well-developed, well-nourished 69-year-old female who appears her stated age, lying comfortably in bed.   VITAL SIGNS:  Blood pressure is 124/74, pulse 75, O2 sat is 98% on room air.   HEENT:  Normocephalic and atraumatic.   CARDIOVASCULAR:  Regular rate and rhythm, no murmurs.   PULMONARY:  Normal effort.   LUNGS:  Clear.   ABDOMEN:  Soft, nontender.   EXTREMITIES:  Dorsalis pedis pulses are palpable bilaterally.  Lower extremities without edema.  Arthritic changes to bilateral hands.   NEUROLOGIC:  Alert and oriented.  Cranial nerves II through XII grossly intact.      LABORATORY DATA:  Reviewed in Epic.      ASSESSMENT:  Saira Pyle is a 69-year-old female with past medical history of rheumatoid arthritis and lupus on DMARD and prednisone, who was admitted under the care of Orthopedics and is status post a left total knee arthroplasty.   1.  Status post left total knee arthroplasty, postoperative day #0.  Routine postoperative cares and pain control deferred to Orthopedics.   2.  Lupus and rheumatoid arthritis.  Followed by Rheumatology.  She is maintained on a regimen of Arava and prednisone 1 mg daily.  She notes she was given the okay from her rheumatologist to continue Arava without interruption.  Will continue prior to admission Arava and prednisone 1 mg.  Given low-dose prednisone, minimal concern for HPA suppressant.  Monitor for signs of adrenal insufficiency.   3.  Restrictive lung disease.  Continue prior to admission Breo Ellipta, patient may use home supply.    4.  Nonobstructive coronary artery disease.  Denies any symptoms of chest pain.  Continue prior to admission statin.   5.  Deep venous thrombosis prophylaxis deferred to Orthopedics.  The patient has been initiated on full dose aspirin and PCDs.      CODE STATUS:  Full code.      We, the Hospitalist Service, thank you for this consult.  We will follow along with you.  Please call if questions.      This patient was discussed with Dr. Tomás Escobar of the Hospitalist Service who independently interviewed the patient.  He is in agreement with the above plan.         TOMÁS ESCOBAR MD       As dictated by LISSET ORTEGA PA-C            D: 10/01/2019   T: 10/01/2019   MT: MARANDA      Name:     EDGAR ZARATE   MRN:      [id number]        Account:       [id number]   :      1950           Consult Date:  10/01/2019      Document: [id number]       cc: Kelise Hardin MD

## 2019-10-01 NOTE — OR NURSING
Patient Saira Pyle is in stable condition and has met criteria for PACU discharge.  MDA Dr. Diaz was  informed and a sign out was given for Unit/Station transfer.

## 2019-10-01 NOTE — PLAN OF CARE
Patient arrived to unit from PACU ~1220. A&Ox4. VS stable. Up with assist of 2 and walker to BSC. Voided small amount, patient c/o bladder discomfort still, needed to be straight cathed for retention. Tolerating regular diet. Oxy given for pain management.

## 2019-10-01 NOTE — PROGRESS NOTES
10/01/19 1700   Quick Adds   Type of Visit Initial PT Evaluation   Living Environment   Lives With spouse   Living Arrangements house   Home Accessibility no concerns   Transportation Anticipated family or friend will provide;car, drives self   Living Environment Comment no steps to enter through garage, 1 knee to enter through front door; spouse took 2 weeks off work to assist as needed   Self-Care   Usual Activity Tolerance good   Current Activity Tolerance moderate   Regular Exercise Yes   Activity/Exercise Type biking  (supine exercises)   Exercise Amount/Frequency 3-5 times/wk   Equipment Currently Used at Home none   Activity/Exercise/Self-Care Comment FWW, SEC, comfort height toilets with grab bar, shower chair   Functional Level Prior   Ambulation 0-->independent   Transferring 0-->independent   Fall history within last six months no   Which of the above functional risks had a recent onset or change? ambulation;transferring   General Information   Onset of Illness/Injury or Date of Surgery - Date 10/01/19   Referring Physician Mell Espinosa, DEMETRIS   Patient/Family Goals Statement Home with spouse and OP PT   Pertinent History of Current Problem (include personal factors and/or comorbidities that impact the POC) Pt is a 68yo female seen POD#1 s/p L TKA, WBAT. PMH includes R TKA ~1 year ago, lupus, RA   Precautions/Limitations fall precautions   Weight-Bearing Status - LLE weight-bearing as tolerated   General Observations Pt resting in bed upon arrival; pt with IV, capnography, PCDs   General Info Comments Activity: ambulate with assistance 3 times daily, OOB DOS if pt tolerates, up to chair 3 times daily   Cognitive Status Examination   Orientation orientation to person, place and time   Level of Consciousness alert   Follows Commands and Answers Questions 100% of the time   Personal Safety and Judgment intact   Pain Assessment   Patient Currently in Pain Yes, see Vital Sign flowsheet  "  Integumentary/Edema   Integumentary/Edema Comments LLE ace wrapped otherwise no other deficits noted   Posture    Posture Forward head position   Range of Motion (ROM)   ROM Comment L knee limited by pain otherwise BLE WFL for mobility; L ankle WNL with P/AAROM   Strength   Strength Comments L ankle dorsiflexion 1/5, Sam for SAQ and heel slides; RLE 5/5   Bed Mobility   Bed Mobility Comments SBA supine>sit   Transfer Skills   Transfer Comments Sam sit>stand to FWW   Gait   Gait Comments Sam for balance a few steps fowrard/back at bedside, L foot drop noted   Balance   Balance Comments SBA at EOB   Sensory Examination   Sensory Perception Comments reports some residual \"numbness\" to L anterior shin but appears intact to light touch screen (done over ace wrap)   General Therapy Interventions   Planned Therapy Interventions bed mobility training;gait training;ROM;strengthening;stretching;transfer training;progressive activity/exercise;home program guidelines   Clinical Impression   Criteria for Skilled Therapeutic Intervention yes, treatment indicated   PT Diagnosis Impaired gait   Influenced by the following impairments Pain, weakness, decreased ROM, decreased balance   Functional limitations due to impairments Decreased safety and IND with functional transfers, gait   Clinical Presentation Evolving/Changing   Clinical Presentation Rationale L foot drop   Clinical Decision Making (Complexity) Low complexity   Therapy Frequency 2x/day   Predicted Duration of Therapy Intervention (days/wks) 3 days   Anticipated Discharge Disposition Home with Outpatient Therapy   Risk & Benefits of therapy have been explained Yes   Patient, Family & other staff in agreement with plan of care Yes   [facility] TM \"6 Clicks\"   2016, Trustees of [facility], under license to [employer]  All rights reserved.   6 Clicks Short Forms Basic Mobility Inpatient Short Form   [facility] AM-PAC  \"6 Clicks\" V.2 Basic " Mobility Inpatient Short Form   1. Turning from your back to your side while in a flat bed without using bedrails? 4 - None   2. Moving from lying on your back to sitting on the side of a flat bed without using bedrails? 3 - A Little   3. Moving to and from a bed to a chair (including a wheelchair)? 3 - A Little   4. Standing up from a chair using your arms (e.g., wheelchair, or bedside chair)? 3 - A Little   5. To walk in hospital room? 3 - A Little   6. Climbing 3-5 steps with a railing? 3 - A Little   Basic Mobility Raw Score (Score out of 24.Lower scores equate to lower levels of function) 19   Total Evaluation Time   Total Evaluation Time (Minutes) 10

## 2019-10-01 NOTE — ANESTHESIA PREPROCEDURE EVALUATION
Anesthesia Pre-Procedure Evaluation    Patient: Saira Pyle   MRN: [id number] : 1950          Preoperative Diagnosis: DJD LEFT KNEE    Procedure(s):  LEFT TOTAL KNEE ARTHROPLASTY (DEPUY ATTUNE) (REVISION / STABILITY COMPONENT)^    Past Medical History:   Diagnosis Date     Adverse effect of other specified systemic anti-infectives and antiparasitics, subsequent encounter      Anterior corneal dystrophy      Blepharitis      Bronchiolitis     small airways disease on chest CT , probably related to SLE     Coronary artery disease      Cortical age-related cataract of both eyes      Disseminated retinitis and retinochoroiditis, pigment epitheliopathy      Diverticulitis of colon      GERD (gastroesophageal reflux disease)      Heart murmur     mitral valve disorder     Hypersomnia      ILD (interstitial lung disease) (H)     Mild ILD on chest CT , probably related to SLE     Keratopathy      MGD (meibomian gland dysfunction)      Osteopenia      Pericarditis     due to SLE, s/p pericardial window      Plaquenil causing adverse effect in therapeutic use      Recurrent colitis due to Clostridium difficile      Reflux esophagitis      Restrictive lung disease      SLE (systemic lupus erythematosus related syndrome) (H)     dx in 20s; arthritis, serositis (pericarditis and pleuritis)     SLE (systemic lupus erythematosus related syndrome) (H)      Tracheostomy in place (H)      Past Surgical History:   Procedure Laterality Date     ARTHROPLASTY KNEE Right 10/2/2018    Procedure: ARTHROPLASTY KNEE;  RIGHT TOTAL KNEE ARTHROPLASTY ;  Surgeon: Kelsie Hardin MD;  Location: [facility] OR     CHOLECYSTECTOMY  2004     GYN SURGERY  2001    LEEP     GYN SURGERY  1985    removal of uterine fibroids     ORTHOPEDIC SURGERY Right     knee cartilage     ORTHOPEDIC SURGERY Left 2005    foot surgery     ORTHOPEDIC SURGERY Left     knee meniscus     ORTHOPEDIC SURGERY Left     thumb     ORTHOPEDIC SURGERY  "Right 2008    ulnar release     ORTHOPEDIC SURGERY Left 2017    ulnar release     THORACIC SURGERY      periocardiocentesis       Anesthesia Evaluation     . Pt has had prior anesthetic.     No history of anesthetic complications          ROS/MED HX    ENT/Pulmonary:     (+)Moderate Persistent asthma , . .   (-) tobacco use and sleep apnea   Neurologic:       Cardiovascular:     (+) Dyslipidemia, hypertension--CAD, --. : . . . :. .      (-) angina, valvular problems/murmurs and angina   METS/Exercise Tolerance:     Hematologic:         Musculoskeletal: Comment: Lupus  Osteopenia        GI/Hepatic:     (+) GERD Asymptomatic on medication,       Renal/Genitourinary:         Endo:      (-) Type I DM and Type II DM   Psychiatric:         Infectious Disease:         Malignancy:         Other:                          Physical Exam  Normal systems: dental    Airway   Mallampati: II  TM distance: >3 FB  Neck ROM: full    Dental     Cardiovascular   Rhythm and rate: regular  (-) no murmur    Pulmonary    breath sounds clear to auscultation            Lab Results   Component Value Date    HGB 10.7 (L) 10/04/2018     10/05/2018    CR 0.54 10/05/2018    GLC 97 10/05/2018       Preop Vitals  BP Readings from Last 3 Encounters:   09/12/19 (!) 146/86   03/07/19 113/70   10/05/18 92/54    Pulse Readings from Last 3 Encounters:   09/12/19 92   03/07/19 84   10/03/18 80      Resp Readings from Last 3 Encounters:   09/12/19 17   10/05/18 16   08/30/18 16    SpO2 Readings from Last 3 Encounters:   09/12/19 100%   03/07/19 98%   10/05/18 95%      Temp Readings from Last 1 Encounters:   10/05/18 37.1  C (98.8  F)    Ht Readings from Last 1 Encounters:   09/12/19 1.549 m (5' 1\")      Wt Readings from Last 1 Encounters:   09/12/19 49.9 kg (110 lb)    Estimated body mass index is 20.78 kg/m  as calculated from the following:    Height as of 9/12/19: 1.549 m (5' 1\").    Weight as of 9/12/19: 49.9 kg (110 lb).       Anesthesia " Plan      History & Physical Review  History and physical reviewed and following examination; no interval change.    ASA Status:  2 .    NPO Status:  > 8 hours    Plan for Spinal and For Post-op pain in coordination with surgeon with Intravenous induction.   PONV prophylaxis:  Ondansetron (or other 5HT-3) and Dexamethasone or Solumedrol  Give 100 mg Hydrocortisone         Postoperative Care  Postoperative pain management:  Multi-modal analgesia.      Consents  Anesthetic plan, risks, benefits and alternatives discussed with:  Patient.  Use of blood products discussed: No .   .                 Jas Diaz MD

## 2019-10-01 NOTE — PROGRESS NOTES
Admission medication history interview status for the 10/1/2019  admission is complete. See EPIC admission navigator for prior to admission medications     Medication history source reliability:Good    Medication history interview source(s):Patient    Medication history resources (including written lists, pill bottles, clinic record):Patient mailed in a med list prior to day of procedure.    Primary pharmacy.[facility] [place]    Additional medication history information not noted on PTA med list :None    Time spent in this activity: 30 minutes.    Prior to Admission medications    Medication Sig Last Dose Taking? Auth Provider   acyclovir (ZOVIRAX) 400 MG tablet Take 400 mg by mouth every morning  9/30/2019 at am Yes Reported, Patient   amLODIPine (NORVASC) 5 MG tablet Take 5 mg by mouth daily as needed For Raynauds Syndrome. Used seasonally when temperature is below 40 degrees. Over 6 months ago at prn Yes Reported, Patient   Ascorbic Acid (VITAMIN C PO) Take 500 mg by mouth every morning  9/30/2019 at am Yes Reported, Patient   calcium carbonate-vitamin D 600-200 MG-UNIT TABS Take 1 tablet by mouth daily  9/30/2019 at am Yes Reported, Patient   estradiol (ESTRACE VAGINAL) 0.1 MG/GM cream Place 1 g vaginally once a week  Over 1 week ago at hs Yes Reported, Patient   estradiol (VAGIFEM) 10 MCG TABS vaginal tablet Place 10 mcg vaginally once a week (with Estrace cream.) Over 1 week ago at hs Yes Reported, Patient   fluticasone-vilanterol (BREO ELLIPTA) 100-25 MCG/INH inhaler Inhale 1 puff into the lungs daily 10/1/2019 at 0530 Yes Petra Valentin MD   gabapentin (NEURONTIN) 100 MG capsule Take 300 mg by mouth At Bedtime (Takes 3 x 100mg = 300mg) 9/30/2019 at 2100 Yes Reported, Patient   glucosamine-chondroitin 500-400 MG CAPS per capsule Take 1 capsule by mouth daily Over 2 weeks ago at am Yes Reported, Patient   hydroxypropyl methylcellulose (GENTEAL) 0.2 % SOLN ophthalmic solution Place 1 drop into  both eyes At Bedtime  9/30/2019 at 2100 Yes Reported, Patient   LACTOBACILLUS ACID-PECTIN PO Take 1 tablet by mouth daily 9/30/2019 at am Yes Reported, Patient   leflunomide (ARAVA) 10 MG tablet Take 10 mg by mouth every morning  9/30/2019 at am Yes Reported, Patient   melatonin 5 MG tablet Take 5 mg by mouth At Bedtime (Takes 2 x 5mg = 10mg)  9/30/2019 at 2100 Yes Reported, Patient   montelukast (SINGULAIR) 10 MG tablet Take 1 tablet (10 mg) by mouth At Bedtime 9/30/2019 at 2100 Yes Gricelda Barlow MD   pimecrolimus (ELIDEL) 1 % cream Apply topically daily as needed  Over 3 months ago at prn Yes Reported, Patient   predniSONE (DELTASONE) 1 MG tablet Take 1 mg by mouth daily (with breakfast)  10/1/2019 at 0530 Yes Reported, Patient   RABEprazole (ACIPHEX) 20 MG EC tablet Take 20 mg by mouth At Bedtime  9/30/2019 at 2100 Yes Reported, Patient   rosuvastatin (CRESTOR) 5 MG tablet Take 5 mg by mouth See Admin Instructions Takes 5 mg  for 2 consecutive days then 1 day off. 10/1/2019 at 0530 Yes Reported, Patient   triamcinolone (KENALOG) 0.1 % external cream Apply topically 2 times daily as needed for irritation Over 6 months ago at prn Yes Reported, Patient   Turmeric 500 MG TABS Take 500 mg by mouth every morning Over 2 weeks ago at am Yes Reported, Patient   Vitamin D, Cholecalciferol, 1000 units CAPS Take 1,000 Units by mouth 2 times daily  9/30/2019 at 2100 Yes Reported, Patient   zinc gluconate 50 MG tablet Take 50 mg by mouth daily  Over 2 weeks ago at am Yes Reported, Patient   order for DME Equipment being ordered: Walker Wheels () and Walker ()  Treatment Diagnosis: impaired gait  Patient not taking: Reported on 3/7/2019   Kelsie Hardin MD

## 2019-10-01 NOTE — ANESTHESIA POSTPROCEDURE EVALUATION
Patient: Saira Pyle    Procedure(s):  EXAM UNDER ANESTHEISIA  AND LEFT TOTAL KNEE ARTHROPLASTY    Diagnosis:DJD LEFT KNEE  Diagnosis Additional Information: No value filed.    Anesthesia Type:  Spinal, For Post-op pain in coordination with surgeon    Note:  Anesthesia Post Evaluation    Patient location during evaluation: PACU  Patient participation: Able to participate in evaluation but full recovery from regional anesthesia has not yet occurred and is not anticipated to occur within 48 hours  Level of consciousness: awake and alert  Pain management: adequate  Airway patency: patent  Cardiovascular status: acceptable and stable  Respiratory status: acceptable, spontaneous ventilation, unassisted and nonlabored ventilation  Hydration status: acceptable  PONV: none             Last vitals:  Vitals:    10/01/19 1200 10/01/19 1205 10/01/19 1220   BP: 128/66  120/72   Pulse: 63  64   Resp: 9  14   Temp:   36.5  C (97.7  F)   SpO2: 99% 97% 96%         Electronically Signed By: Jas Diaz MD  October 1, 2019  12:33 PM

## 2019-10-02 ENCOUNTER — DOCUMENTATION ONLY (OUTPATIENT)
Dept: OTHER | Facility: CLINIC | Age: 69
End: 2019-10-02

## 2019-10-02 ENCOUNTER — APPOINTMENT (OUTPATIENT)
Dept: PHYSICAL THERAPY | Facility: CLINIC | Age: 69
End: 2019-10-02
Attending: ORTHOPAEDIC SURGERY
Payer: COMMERCIAL

## 2019-10-02 ENCOUNTER — APPOINTMENT (OUTPATIENT)
Dept: OCCUPATIONAL THERAPY | Facility: CLINIC | Age: 69
End: 2019-10-02
Attending: PHYSICIAN ASSISTANT
Payer: COMMERCIAL

## 2019-10-02 VITALS
SYSTOLIC BLOOD PRESSURE: 116 MMHG | OXYGEN SATURATION: 96 % | HEIGHT: 61 IN | HEART RATE: 75 BPM | WEIGHT: 112 LBS | BODY MASS INDEX: 21.14 KG/M2 | TEMPERATURE: 97.7 F | RESPIRATION RATE: 14 BRPM | DIASTOLIC BLOOD PRESSURE: 59 MMHG

## 2019-10-02 PROBLEM — Z96.652 STATUS POST REVISION OF TOTAL KNEE, LEFT: Status: ACTIVE | Noted: 2019-10-02

## 2019-10-02 LAB
ANION GAP SERPL CALCULATED.3IONS-SCNC: 4 MMOL/L (ref 3–14)
BUN SERPL-MCNC: 12 MG/DL (ref 7–30)
CALCIUM SERPL-MCNC: 8.7 MG/DL (ref 8.5–10.1)
CHLORIDE SERPL-SCNC: 111 MMOL/L (ref 94–109)
CO2 SERPL-SCNC: 27 MMOL/L (ref 20–32)
CREAT SERPL-MCNC: 0.5 MG/DL (ref 0.52–1.04)
GFR SERPL CREATININE-BSD FRML MDRD: >90 ML/MIN/{1.73_M2}
GLUCOSE SERPL-MCNC: 95 MG/DL (ref 70–99)
HGB BLD-MCNC: 11.2 G/DL (ref 11.7–15.7)
PLATELET # BLD AUTO: 212 10E9/L (ref 150–450)
POTASSIUM SERPL-SCNC: 4.1 MMOL/L (ref 3.4–5.3)
SODIUM SERPL-SCNC: 142 MMOL/L (ref 133–144)

## 2019-10-02 PROCEDURE — 97116 GAIT TRAINING THERAPY: CPT | Mod: GP | Performed by: PHYSICAL THERAPIST

## 2019-10-02 PROCEDURE — 85049 AUTOMATED PLATELET COUNT: CPT | Performed by: PHYSICIAN ASSISTANT

## 2019-10-02 PROCEDURE — 97530 THERAPEUTIC ACTIVITIES: CPT | Mod: GP | Performed by: PHYSICAL THERAPIST

## 2019-10-02 PROCEDURE — 97165 OT EVAL LOW COMPLEX 30 MIN: CPT | Mod: GO

## 2019-10-02 PROCEDURE — 80048 BASIC METABOLIC PNL TOTAL CA: CPT | Performed by: PHYSICIAN ASSISTANT

## 2019-10-02 PROCEDURE — 85049 AUTOMATED PLATELET COUNT: CPT | Performed by: ORTHOPAEDIC SURGERY

## 2019-10-02 PROCEDURE — 25000125 ZZHC RX 250: Performed by: PHYSICIAN ASSISTANT

## 2019-10-02 PROCEDURE — 25000132 ZZH RX MED GY IP 250 OP 250 PS 637: Performed by: PHYSICIAN ASSISTANT

## 2019-10-02 PROCEDURE — 99224 ZZC SUBSEQUENT OBSERVATION CARE,LEVEL I: CPT | Performed by: PHYSICIAN ASSISTANT

## 2019-10-02 PROCEDURE — 36415 COLL VENOUS BLD VENIPUNCTURE: CPT | Performed by: PHYSICIAN ASSISTANT

## 2019-10-02 PROCEDURE — 25000131 ZZH RX MED GY IP 250 OP 636 PS 637: Performed by: PHYSICIAN ASSISTANT

## 2019-10-02 PROCEDURE — 85018 HEMOGLOBIN: CPT | Performed by: PHYSICIAN ASSISTANT

## 2019-10-02 PROCEDURE — 99207 ZZC CDG-CODE CATEGORY CHANGED: CPT | Performed by: PHYSICIAN ASSISTANT

## 2019-10-02 PROCEDURE — 97110 THERAPEUTIC EXERCISES: CPT | Mod: GP | Performed by: PHYSICAL THERAPIST

## 2019-10-02 PROCEDURE — 25000128 H RX IP 250 OP 636: Performed by: ORTHOPAEDIC SURGERY

## 2019-10-02 PROCEDURE — 97535 SELF CARE MNGMENT TRAINING: CPT | Mod: GO

## 2019-10-02 RX ORDER — OXYCODONE HYDROCHLORIDE 5 MG/1
5 TABLET ORAL EVERY 4 HOURS PRN
Qty: 30 TABLET | Refills: 0 | Status: SHIPPED | OUTPATIENT
Start: 2019-10-02 | End: 2023-10-26

## 2019-10-02 RX ORDER — AMOXICILLIN 250 MG
1 CAPSULE ORAL 2 TIMES DAILY
Qty: 30 TABLET | Refills: 1 | Status: SHIPPED | OUTPATIENT
Start: 2019-10-02 | End: 2023-10-26

## 2019-10-02 RX ADMIN — OXYCODONE HYDROCHLORIDE 5 MG: 5 TABLET ORAL at 04:48

## 2019-10-02 RX ADMIN — ACYCLOVIR 400 MG: 400 TABLET ORAL at 08:06

## 2019-10-02 RX ADMIN — PREDNISONE 1 MG: 1 TABLET ORAL at 08:06

## 2019-10-02 RX ADMIN — CLINDAMYCIN PHOSPHATE 900 MG: 900 INJECTION, SOLUTION INTRAVENOUS at 01:03

## 2019-10-02 RX ADMIN — OXYCODONE HYDROCHLORIDE 5 MG: 5 TABLET ORAL at 13:36

## 2019-10-02 RX ADMIN — ONDANSETRON 4 MG: 4 TABLET, ORALLY DISINTEGRATING ORAL at 15:36

## 2019-10-02 RX ADMIN — LEFLUNOMIDE 10 MG: 10 TABLET ORAL at 08:06

## 2019-10-02 RX ADMIN — SENNOSIDES AND DOCUSATE SODIUM 2 TABLET: 8.6; 5 TABLET ORAL at 08:06

## 2019-10-02 RX ADMIN — OXYCODONE HYDROCHLORIDE 5 MG: 5 TABLET ORAL at 08:58

## 2019-10-02 RX ADMIN — OXYCODONE HYDROCHLORIDE 5 MG: 5 TABLET ORAL at 00:26

## 2019-10-02 RX ADMIN — ENOXAPARIN SODIUM 40 MG: 40 INJECTION SUBCUTANEOUS at 08:59

## 2019-10-02 ASSESSMENT — ACTIVITIES OF DAILY LIVING (ADL)
ADLS_ACUITY_SCORE: 11
PREVIOUS_RESPONSIBILITIES: MEAL PREP;HOUSEKEEPING;LAUNDRY;SHOPPING;MEDICATION MANAGEMENT;FINANCES;DRIVING

## 2019-10-02 NOTE — PROGRESS NOTES
Spiritual Health    Pt is a member of a [employer] and attends regularly with her . Pt prays often and listens for God's answers to question she has. Pt remarked she had trouble sleeping the other night and tries to rest during the day when she can. SH offered prayer and Pt requested prayer.     [facility] listened, provided support, and prayed.     Pt is coping very well and has good support from family and [employer].     SH will remain available as needed.     Jennifer Orourke  Chaplain Resident

## 2019-10-02 NOTE — DISCHARGE INSTRUCTIONS
Here is a video link for you and your family to learn or review instructions on self Lovenox injection    Insert the link below into your browser to view video      [email or web address]

## 2019-10-02 NOTE — PLAN OF CARE
OT: Evaluation and treatment initiated. Pt lives in a home with her spouse, all needs met on the main level. Prior pt independent in all I/ADLs.     Patient plan: Home    Current status: Pt went from sit<>Stand with SBA. Pt ambulated to the bathroom with SBA and walker and transferred to/from the toilet with SBA. Pt reported increase in nausea and slight dizziness. Blood pressure monitored (see vitals flowsheet for details), BP in supine: 106/55 and after activity BP: 86/45.  Pt symptomatic and BP soft. RN notified.     Anticipated status at discharge: Assist as needed for I/ADLs (including shower transfers, LE dressing, and home management tasks).

## 2019-10-02 NOTE — PLAN OF CARE
Patient plan: Per pt, home with spouse and OP PT     Current status: Continue to note only trace contraction of L anterior tibialis, unable to actively dorsiflex L foot, very slight improvement from yesterday - surgeon aware. Pt rates pain 5-7/10, primarily in L ankle, not tender to calf palpation and does not appear red/swollen; RN aware. BP soft 90s/60s, pt symptomatic towards end of gait but improved supine. Pt participated in L TKA exercises and tolerated well. Pt transfers supine<>sit with SBA. Pt transfers sit<>stand to FWW with SBA. Pt ambulated 10'x1 and 100' x1 with FWW and SBA. L knee AAROM 4-102 degrees.     Anticipated status at discharge: supervision for mobility d/t L foot drop

## 2019-10-02 NOTE — PROGRESS NOTES
10/02/19 1107   Quick Adds   Type of Visit Initial Occupational Therapy Evaluation   Living Environment   Lives With spouse   Living Arrangements house  ([place] home )   Transportation Anticipated family or friend will provide  (Pt typically drives )   Living Environment Comment spouse took 2 weeks off work to assist as needed. Has a tub/shower.    Self-Care   Usual Activity Tolerance good   Current Activity Tolerance moderate   Regular Exercise Yes   Activity/Exercise Type biking   Exercise Amount/Frequency 3-5 times/wk   Equipment Currently Used at Home shower chair;grab bar, tub/shower;raised toilet   Functional Level   Ambulation 0-->independent   Transferring 0-->independent   Toileting 0-->independent   Bathing 1-->assistive equipment  (shower chair )   Dressing 0-->independent   Fall history within last six months no   General Information   Onset of Illness/Injury or Date of Surgery - Date 10/01/19   Referring Physician Mell Espinosa PA-C   Patient/Family Goals Statement Home    Additional Occupational Profile Info/Pertinent History of Current Problem s/p L TKA, WBAT. PMH includes R TKA ~1 year ago, lupus, RA   Weight-Bearing Status - LLE weight-bearing as tolerated   Cognitive Status Examination   Orientation orientation to person, place and time   Level of Consciousness alert   Sensory Examination   Sensory Quick Adds No deficits were identified   Pain Assessment   Patient Currently in Pain Yes, see Vital Sign flowsheet   Mobility   Bed Mobility Bed mobility skill: Supine to sit   Bed Mobility Skill: Supine to Sit   Level of [place]: Supine/Sit stand-by assist   Physical Assist/Nonphysical Assist: Supine/Sit verbal cues   Transfer Skills   Transfer Transfer Safety Analysis Bed/Chair;Transfer Skill: Stand to Sit;Transfer Safety Analysis Sit/Stand   Transfer Skill: Sit to Stand   Level of [place]: Sit/Stand stand-by assist   Physical Assist/Nonphysical Assist: Sit/Stand verbal cues  "  Toilet Transfer   Toilet Transfer Toilet Transfer Safety Analysis;Toilet Transfer Skill   Transfer Skill: Toilet Transfer   Level of [place]: Toilet stand-by assist   Physical Assist/Nonphysical Assist: Toilet verbal cues   Instrumental Activities of Daily Living (IADL)   Previous Responsibilities meal prep;housekeeping;laundry;shopping;medication management;finances;driving   General Therapy Interventions   Planned Therapy Interventions ADL retraining;IADL retraining;transfer training   Clinical Impression   Criteria for Skilled Therapeutic Interventions Met yes, treatment indicated   OT Diagnosis Decreased independence for I/ADLs    Influenced by the following impairments Decreased independence for I/ADLs    Assessment of Occupational Performance 1-3 Performance Deficits   Identified Performance Deficits Decreased independence for I/ADLs  (dressing, bathing, toileting)   Clinical Decision Making (Complexity) Low complexity   Therapy Frequency Daily   Predicted Duration of Therapy Intervention (days/wks) 3 days   Anticipated Discharge Disposition Home with Assist   Risks and Benefits of Treatment have been explained. Yes   Patient, Family & other staff in agreement with plan of care Yes   [facility] \"6 Clicks\"   2016, Trustees of [facility], under license to [employer].  All rights reserved.   6 Clicks Short Forms Daily Activity Inpatient Short Form   [facility] AM-PAC  \"6 Clicks\" Daily Activity Inpatient Short Form   1. Putting on and taking off regular lower body clothing? 3 - A Little   2. Bathing (including washing, rinsing, drying)? 3 - A Little   3. Toileting, which includes using toilet, bedpan or urinal? 3 - A Little   4. Putting on and taking off regular upper body clothing? 4 - None   5. Taking care of personal grooming such as brushing teeth? 4 - None   6. Eating meals? 4 - None   Daily Activity Raw Score (Score out of 24.Lower scores equate to lower levels of " function) 21   Total Evaluation Time   Total Evaluation Time (Minutes) 8

## 2019-10-02 NOTE — PLAN OF CARE
Patient up with assist of 1 and walker. Adequate I/O. Pain managed with PO meds. Dr. Hardin was updated regarding patients symptoms during therapies. Writer discussed discharge plan with patient and spouse, patient states she is comfortable and safe going home today. Discharge AVS and medications reviewed with both, all questions answered at this time. Patient discharged home with all belongings.

## 2019-10-02 NOTE — PLAN OF CARE
A&OX4. VSS on RA. CMS intact. Unable to dorsiflexion L foot. Per pt she does had hx of L foot drop, but was able to dorsiflex her foot before. Dressing CDI. Up w/ A1. Adequate I/O. Taking oxycodone for pain. Continue to monitor.

## 2019-10-02 NOTE — OP NOTE
Procedure Date: 10/01/2019      SURGEON:  Kelsie Hardin MD.      ASSISTANT:  Trish Hardy PA-C,      Trish Hardy PA-C was required for retraction of soft tissues, protection of neurovascular structures, visualization, and positioning of the leg.      PREOPERATIVE DIAGNOSES:     1.  Left knee endstage osteoarthritis   2.  Left knee ligamentous instability.      POSTOPERATIVE DIAGNOSES:     1.  Left knee endstage osteoarthritis   2.  Left knee ligamentous instability.      PROCEDURES PERFORMED:     1.  Total knee arthroplasty, left knee.   2.  Exam under anesthesia, left knee.      INDICATIONS FOR PROCEDURE:  Ms. Garcias is a 69-year-old woman with multiple medical complications and comorbidities and a ligamentously unstable knee in the setting of advanced generalized arthritis.  Discussion was had after exhaustion of nonoperative measures of further nonoperative measures and additional operative intervention.  Risks, benefits, alternatives and recovery were reviewed, questions answered, and she elected to proceed.      DESCRIPTION OF PROCEDURE:  After informed consent was obtained and operative site marked, a block was administered by Anesthesia in the preoperative holding suite to her adductor canal.  She was then brought to the operating room where a spinal anesthetic and sedation were administered.  Her left lower extremity was then examined under anesthesia.  Findings included approximately 30-35 degrees of recurvatum.  She had instability to varus and valgus stress at 0 and 30 degrees.  This was grade 3 instability.  No generalized swelling or effusion.  Her knee was then prepped and draped in the usual sterile fashion.  A timeout taken to confirm operative site, antibiotic administration, and procedure.  Leg was exsanguinated and the tourniquet insufflated to 300 mmHg.  Straight anterior incision was made on the knee.  Sharp dissection carried out through skin and subcutaneous fat.  Medial  parapatellar arthrotomy was created and the patella was translated laterally.  Anterior horns of the menisci and the fat pad were resected allowing for clear visualization of the tibiofemoral joint.  The femoral intramedullary drill was used to find the intramedullary canal and a canal-based distal femoral articular surfacing resecting guide was positioned.  Ten millimeters was resected.  On this freshly cut surface, the femur was sized and rotational alignment selected.  Once the cutting jig was secured, anterior, posterior and chamfer cuts followed by a PCL sacrificing box cut were carried out.  Additional reaming of the femoral canal was used to accommodate for a stability component and revision type stem.        Attention was then turned to the tibia where an extramedullary guide was fashioned and 5 mm of proximal tibial articular surface was resected once coronal and sagittal alignment was confirmed.  On this surface, sizing was carried out and she was determined to be a size 2.5.  On this side,  cutting punch was secured.  Once the punch was secured, trial femur and polyethylenes were placed and with the knee in full extension, the articular surface of the patella was resected, sized and punched.  At this point, all trials were in place and the knee was found to have full extension without any evidence of significant recurvatum and stability throughout the range of motion 0 and 30 degrees to varus and valgus stress.  Patella was found to track normally free of any external input.  All trials were removed and bone ends were irrigated and dried and prepared for final cementation.  Pericapsular local anesthetic was administered, and final components were cemented into place.  The arthrotomy was closed followed by a layered wound closure of skin and sterile dressing application.  She was then taken to the PACU in stable condition.      ESTIMATED BLOOD LOSS:  Minimal.      COMPLICATIONS:  None apparent.       POSTOPERATIVE PLAN:  She may bear weight as tolerated, has unrestricted range of motion, ice, elevation, intermittent use of intravenous and oral narcotics for pain.  She will use DVT prophylaxis for aspirin.  No further antibiotic prophylaxis will be given given her history of C. difficile.  Outpatient followup in 10-14 days, sooner if needed.         VICTORIANO HARRIS MD             D: 10/01/2019   T: 10/01/2019   MT:       Name:     EDGAR ZARATE   MRN:      [id number]        Account:        [id number]   :      1950           Procedure Date: 10/01/2019      Document: [id number]

## 2019-10-02 NOTE — OP NOTE
Procedure Date: 10/01/2019      ADDENDUM:      IMPLANTS:  DePuy Sigma size 2.5 femur, size 2.5 tibia, 10 mm polyethylene insert, size 35 mm patellar button.      Stems on both the femur and tibial side were used, as well as a TC3 type stability polyethylene insert, given her preoperative ligamentous laxity.                  VICTORIANO HARRIS MD             D: 10/01/2019   T: 10/01/2019   MT: MARANDA      Name:     EDGAR ZARATE   MRN:      [id number]        Account:        [id number]   :      1950           Procedure Date: 10/01/2019      Document: [id number]

## 2019-10-02 NOTE — PROGRESS NOTES
"Saira Muro Edenilson  10/2/2019  POD # 1 s/p TKA    Doing well.  No immediate surgical complications identified.  Pain well-controlled.  Tolerating physical therapy and rehabilitation well.    Blood pressure 115/59, pulse 80, temperature 98.1  F (36.7  C), temperature source Oral, resp. rate 16, height 1.549 m (5' 1\"), weight 50.8 kg (112 lb), SpO2 96 %.  Temperatures:  Current - Temp: 98.1  F (36.7  C); Max - Temp  Av.1  F (36.7  C)  Min: 97.1  F (36.2  C)  Max: 98.8  F (37.1  C)  Pulse range: Pulse  Av.2  Min: 63  Max: 87  Blood pressure range: Systolic (24hrs), Av , Min:105 , Max:135   ; Diastolic (24hrs), Av, Min:48, Max:77    CMS: intact with noted foot drop    Labs:  Hemoglobin   Date Value Ref Range Status   10/02/2019 11.2 (L) 11.7 - 15.7 g/dL Final   ]  No results found for: INR  Lab Results   Component Value Date     10/05/2018       PLAN:  Continue physical therapy  Discharge plan: Home today  "

## 2019-10-02 NOTE — PROGRESS NOTES
[facility]    Medicine Progress Note - Hospitalist Service       Date of Admission:  10/1/2019  Assessment & Plan   Saira Pyle is a 69-year-old female with past medical history of rheumatoid arthritis and lupus on DMARD and prednisone, who was admitted under the care of Orthopedics and is status post a left total knee arthroplasty.     Status post left total knee arthroplasty, postoperative day #0.    - Routine postoperative cares and pain control deferred to Orthopedics  - Hgb stable  - Ok for discharge from a Hospitalist standpoint    Recent Labs   Lab 10/02/19  0623   HGB 11.2*     Soft Blood Pressures, improving  Per patient, baseline SBP 90-110s. Lightheaded when OOB earlier today. BPs mostly 100s-110s/50s. 1 BP 86/45 but no recurrence and improved afterward.  - Pt/significant other to monitor for lightheadedness, dizziness, limit narcotics as able, slow movement with getting up OOB.    Lupus and rheumatoid arthritis.    Followed by Rheumatology.  She is maintained on a regimen of Arava and prednisone 1 mg daily.  She notes she was given the okay from her rheumatologist to continue Arava without interruption.   - Continue PTA Arava and prednisone 1 mg.    - Given low-dose prednisone, minimal concern for HPA suppressant.    - Monitor for signs of adrenal insufficiency.     Restrictive lung disease.  Continue PTA Breo Ellipta, pt may use home supply.     Nonobstructive coronary artery disease.  Denies any symptoms of chest pain.  Continue PTA statin.     Diet: Advance Diet as Tolerated: Regular Diet Adult  Diet    DVT Prophylaxis: Defer to primary service. Noted full dose aspirin and PCDs.  Lino Catheter: not present  Code Status: Full Code      Disposition Plan   Expected discharge: Today, recommended to prior living arrangement once adequate pain management/ tolerating PO medications.  Entered: Columba Maher PA-C 10/02/2019, 4:26 PM       The patient's care was discussed with  the Attending Physician, Dr. Escobar, Bedside Nurse, Patient and Patient's Family.    Columba Maher PA-C  Hospitalist Service  [facility]    ______________________________________________________________________    Interval History   Overall feeling well. No N/V. Taking PO. Ambulated. Bout of lightheadedness earlier today with softer BPs but baseline low BPs.     Data reviewed today: I reviewed all medications, new labs and imaging results over the last 24 hours. I personally reviewed no images or EKG's today.    Physical Exam   Vital Signs: Temp: 97.7  F (36.5  C) Temp src: Oral BP: 116/59 Pulse: 75 Heart Rate: 82 Resp: 14 SpO2: 96 % O2 Device: None (Room air)    Weight: 112 lbs 0 oz    General: Awake, alert, older  woman who appears stated age. Looks comfortable sitting at edge of bed. No acute distress.  HEENT: Normocephalic, atraumatic. Extraocular movements intact.   Respiratory: Clear to auscultation bilaterally, no rales, wheezing, or rhonchi.  Cardiovascular: Regular rate and rhythm, +S1 and S2, no murmur auscultated. No peripheral edema.   Gastrointestinal: Soft, non-tender, non-distended. Bowel sounds present.  Skin: Warm, dry. No obvious rashes or lesions on exposed skin. Dorsalis pedis pulses palpable bilaterally.  Musculoskeletal: No joint swelling, erythema or tenderness. Moves all extremities equally. Left knee wrapped.   Neurologic: AAO x3. Cranial nerves 2-12 grossly intact, normal strength and sensation.  Psychiatric: Appropriate mood and affect. No obvious anxiety or depression.    Data   Recent Labs   Lab 10/02/19  0623   HGB 11.2*         POTASSIUM 4.1   CHLORIDE 111*   CO2 27   BUN 12   CR 0.50*   ANIONGAP 4   ROBIN 8.7   GLC 95     No results found for this or any previous visit (from the past 24 hour(s)).  Medications     lactated ringers 75 mL/hr at 10/01/19 1420       acyclovir  400 mg Oral QAM     enoxaparin  40 mg Subcutaneous Q24H      fluticasone-vilanterol  1 puff Inhalation Daily     gabapentin  300 mg Oral At Bedtime     hypromellose  1 drop Both Eyes At Bedtime     leflunomide  10 mg Oral QAM     montelukast  10 mg Oral At Bedtime     pantoprazole  20 mg Oral At Bedtime     predniSONE  1 mg Oral Daily with breakfast     [START ON 10/3/2019] rosuvastatin  5 mg Oral See Admin Instructions     senna-docusate  1 tablet Oral BID    Or     senna-docusate  2 tablet Oral BID     sodium chloride (PF)  3 mL Intracatheter Q8H

## 2019-10-03 NOTE — PLAN OF CARE
Occupational Therapy Discharge Summary    Reason for therapy discharge:    Discharged to home.    Progress towards therapy goal(s). See goals on Care Plan in [facility] electronic health record for goal details.  Goals not met.  Barriers to achieving goals:   discharge from facility.    Therapy recommendation(s):    No further therapy is recommended.

## 2019-10-03 NOTE — PLAN OF CARE
Physical Therapy Discharge Summary    Reason for therapy discharge:    Discharged to home with outpatient therapy.    Progress towards therapy goal(s). See goals on Care Plan in [facility] electronic health record for goal details.  Goals not met.  Barriers to achieving goals:   limited tolerance for therapy and discharge from facility.    Therapy recommendation(s):    Continued therapy is recommended.  Rationale/Recommendations:  Pt will benefit from OP PT to progress functional strength, ROM, and gait mechanics.

## 2019-10-05 NOTE — PLAN OF CARE
[facility]      OUTPATIENT OCCUPATIONAL THERAPY  EVALUATION  PLAN OF TREATMENT FOR OUTPATIENT REHABILITATION  (COMPLETE FOR INITIAL CLAIMS ONLY)  Patient's Last Name, First Name, M.I.  YOB: 1950  Saira Stanford                          Provider's Name  [facility] Medical Record No.  [id number]                               Onset Date:  10/01/19   Start of Care Date:     10-2-19   Type:     ___PT   _X_OT   ___SLP Medical Diagnosis:                        L TKA   OT Diagnosis:  Decreased independence for I/ADLs    Visits from SOC:  1   _________________________________________________________________________________  Plan of Treatment/Functional Goals    Planned Interventions: ADL retraining, IADL retraining, transfer training,       Goals: See Occupational Therapy Goals on Care Plan in [facility] electronic health record.    Therapy Frequency: Daily  Predicted Duration of Therapy Intervention: 3 days  _________________________________________________________________________________    I CERTIFY THE NEED FOR THESE SERVICES FURNISHED UNDER        THIS PLAN OF TREATMENT AND WHILE UNDER MY CARE     (Physician co-signature of this document indicates review and certification of the therapy plan).               ,      Referring Physician: Mell Espinosa PA-C            Initial Assessment        See Occupational Therapy evaluation dated   in Epic electronic health record.

## 2019-10-06 NOTE — PROGRESS NOTES
[facility]      OUTPATIENT PHYSICAL THERAPY EVALUATION  PLAN OF TREATMENT FOR OUTPATIENT REHABILITATION  (COMPLETE FOR INITIAL CLAIMS ONLY)  Patient's Last Name, First Name, M.I.  YOB: 1950  Saira Stanford                        Provider's Name  [facility] Medical Record No.  [id number]                               Onset Date:  10/01/19   Start of Care Date:  10/01/19      Type:     _X_PT   ___OT   ___SLP Medical Diagnosis:  s/p L TKA                        PT Diagnosis:  Impaired gait   Visits from SOC:  1   _________________________________________________________________________________  Plan of Treatment/Functional Goals    Planned Interventions:  ,    bed mobility training, gait training, ROM, strengthening, stretching, transfer training, progressive activity/exercise, home program guidelines,       Goals: See Physical Therapy Goals on Care Plan in [facility] electronic health record.    Therapy Frequency: 2x/day  Predicted Duration of Therapy Intervention: 3 days  _________________________________________________________________________________    I CERTIFY THE NEED FOR THESE SERVICES FURNISHED UNDER        THIS PLAN OF TREATMENT AND WHILE UNDER MY CARE     (Physician co-signature of this document indicates review and certification of the therapy plan).                Certification date from: 10/01/19, Certification date to: 10/03/19    Referring Physician: Mell Espinosa PA-C            Initial Assessment        See Physical Therapy evaluation dated 10/01/19 in Epic electronic health record.

## 2020-03-04 ENCOUNTER — DOCUMENTATION ONLY (OUTPATIENT)
Dept: CARE COORDINATION | Facility: CLINIC | Age: 70
End: 2020-03-04

## 2020-06-01 DIAGNOSIS — J21.9 BRONCHIOLITIS: ICD-10-CM

## 2020-06-01 RX ORDER — MONTELUKAST SODIUM 10 MG/1
10 TABLET ORAL AT BEDTIME
Qty: 30 TABLET | Refills: 11 | Status: SHIPPED | OUTPATIENT
Start: 2020-06-01 | End: 2021-06-01

## 2020-07-23 ENCOUNTER — VIRTUAL VISIT (OUTPATIENT)
Dept: PULMONOLOGY | Facility: CLINIC | Age: 70
End: 2020-07-23
Attending: INTERNAL MEDICINE
Payer: COMMERCIAL

## 2020-07-23 DIAGNOSIS — J84.9 ILD (INTERSTITIAL LUNG DISEASE) (H): Primary | ICD-10-CM

## 2020-07-23 NOTE — PROGRESS NOTES
"Saira Pyle is a 69 year old female who is being evaluated via a billable video visit.      The patient has been notified of following:     \"This video visit will be conducted via a call between you and your physician/provider. We have found that certain health care needs can be provided without the need for an in-person physical exam.  This service lets us provide the care you need with a video conversation.  If a prescription is necessary we can send it directly to your pharmacy.  If lab work is needed we can place an order for that and you can then stop by our lab to have the test done at a later time.    Video visits are billed at different rates depending on your insurance coverage.  Please reach out to your insurance provider with any questions.    If during the course of the call the physician/provider feels a video visit is not appropriate, you will not be charged for this service.\"    Patient has given verbal consent for Video visit? Yes  How would you like to obtain your AVS? Mail a copy  If you are dropped from the video visit, the video invite should be resent to: Send to e-mail at: [email or web address]  Will anyone else be joining your video visit? No           Phone Visit Details    Type of service:  Video Visit    Phone Visit Duration: 11 minutes.  Video visit did not work - could not get connected, therefore did phone visit with patient.      HPI  Ms. 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  She had a knee replacement last year, and she developed a foot drop after the surgery.  She has been doing physical therapy, and the foot drop has improved.  She reports that her breathing \"is doing good.\"  She does occasionally feel short of breath if she walks uphill.  She denies PND, fever, new rash.  Her joints as well controlled with leflunomide.  She takes Breo Ellipta and montelukast for her lungs.  She does " yard work and gardens. She is able to ride her exercise bike for ~15 minutes.  She is practicing social distancing and wears a mask.      Assessment and Plan:   Ms. 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     1.  Bronchiolitis.  Her bronchiolitis is associated with lupus.  Her lupus is well controlled with leflunomide.  Her symptoms are mild, and her dyspnea has not worsened.  We will continue Breo Ellipta 1 puff daily.  She will continue leflunomide for her lupus.  She will also continue montelukast.  I have not used azithromycin because she had a past adverse reaction (although the report sounds more like a UTI with sepsis).  I will see her back in 6 months in clinic with a PFT.    2.  ILD.  She has mild ILD by chest CT scan which likely is related to her lupus. She is on leflunomide, but her ILD is mild and stable so I would not stop the leflunomide as she has good control of her joints with it.  She did not tolerate mycophenolate in the past due to GI side effects.  She also did not tolerate rituximab in the past because of an infusion reaction (tightness and burning in her throat).  Options for the future might include nintedanib depending on how much fibrosis is present in her lungs.  I recommended that she continue to exercise regularly with her exercise bike.  I also recommended flu vaccine, but she prefers not to get it because she has had reactions to the preservatives in the flu vaccine in the past.

## 2020-07-23 NOTE — LETTER
"    7/23/2020         RE: Saira Pyle  [address] [facility] [address]        Dear Colleague,    Thank you for referring your patient, Saira Pyle, to the [facility]. Please see a copy of my visit note below.    Saira Pyle is a 69 year old female who is being evaluated via a billable video visit.         Phone Visit Details    Type of service:  Video Visit    Phone Visit Duration: 11 minutes.  Video visit did not work - could not get connected, therefore did phone visit with patient.      HPI  Ms. 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  She had a knee replacement last year, and she developed a foot drop after the surgery.  She has been doing physical therapy, and the foot drop has improved.  She reports that her breathing \"is doing good.\"  She does occasionally feel short of breath if she walks uphill.  She denies PND, fever, new rash.  Her joints as well controlled with leflunomide.  She takes Breo Ellipta and montelukast for her lungs.  She does yard work and gardens. She is able to ride her exercise bike for ~15 minutes.  She is practicing social distancing and wears a mask.      Assessment and Plan:   Ms. 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     1.  Bronchiolitis.  Her bronchiolitis is associated with lupus.  Her lupus is well controlled with leflunomide.  Her symptoms are mild, and her dyspnea has not worsened.  We will continue Breo Ellipta 1 puff daily.  She will continue leflunomide for her lupus.  She will also continue montelukast.  I have not used azithromycin because she had a past adverse reaction (although the report sounds more like a UTI with sepsis).  I will see her back in 6 months in clinic with a PFT.    2.  ILD.  She has mild ILD by chest CT " scan which likely is related to her lupus. She is on leflunomide, but her ILD is mild and stable so I would not stop the leflunomide as she has good control of her joints with it.  She did not tolerate mycophenolate in the past due to GI side effects.  She also did not tolerate rituximab in the past because of an infusion reaction (tightness and burning in her throat).  Options for the future might include nintedanib depending on how much fibrosis is present in her lungs.  I recommended that she continue to exercise regularly with her exercise bike.  I also recommended flu vaccine, but she prefers not to get it because she has had reactions to the preservatives in the flu vaccine in the past.    Again, thank you for allowing me to participate in the care of your patient.        Sincerely,        Petra Valentin MD

## 2020-10-15 DIAGNOSIS — J21.9 BRONCHIOLITIS: ICD-10-CM

## 2021-06-01 DIAGNOSIS — J21.9 BRONCHIOLITIS: ICD-10-CM

## 2021-06-01 RX ORDER — MONTELUKAST SODIUM 10 MG/1
10 TABLET ORAL AT BEDTIME
Qty: 30 TABLET | Refills: 3 | Status: SHIPPED | OUTPATIENT
Start: 2021-06-01 | End: 2021-09-21

## 2021-08-06 ENCOUNTER — VIRTUAL VISIT (OUTPATIENT)
Dept: PULMONOLOGY | Facility: CLINIC | Age: 71
End: 2021-08-06
Attending: INTERNAL MEDICINE
Payer: COMMERCIAL

## 2021-08-06 DIAGNOSIS — J84.9 ILD (INTERSTITIAL LUNG DISEASE) (H): Primary | ICD-10-CM

## 2021-08-06 PROCEDURE — 99215 OFFICE O/P EST HI 40 MIN: CPT | Mod: 95 | Performed by: INTERNAL MEDICINE

## 2021-08-06 NOTE — PROGRESS NOTES
"Saira is a 70 year old who is being evaluated via a billable video visit.      How would you like to obtain your AVS? Mail a copy  If the video visit is dropped, the invitation should be resent by: Send to e-mail at: [email or web address]  Will anyone else be joining your video visit? No      Video Start Time:  Patient joined at 11:02AM  Video-Visit Details    Type of service:  Video Visit    Video End Time: 11:15AM    Originating Location (pt. Location): Home    Distant Location (provider location):  [facility]     Platform used for Video Visit: [facility]    Reason for Visit  Saira Pyle is a 70 year old year old female who is being seen for RECHECK (Return video visit )    HPI  Ms. 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    She has follow-up with her rheumatologist Dr. Dewey and saw her recently.  Today, she reports that she has been sick for the past 1 to 2 days.  She endorses sore throat, pain swallowing, fatigue, and nose feels \"a little stuffy\".  She denies fevers, loss of sense of taste or smell, wheezing, chest pain, or new skin rashes.  She reports that her joints are under \"pretty good\" control with leflunomide.  She endorses occasional nocturnal cough which is a little bit worse now.  She continues to use a recumbent exercise bike and denies dyspnea on exertion.    Her medications include Breo Ellipta daily, montelukast daily, prednisone 3 mg daily, and leflunomide.  Breo Ellipta has helped her breathing the most.  She did get her COVID-19 vaccine in March.  She reports that she wears a facemask in public.  She does not get the flu vaccine because she has had a reaction to the preservatives in the past.  She also did not tolerate mycophenolate in the past due to GI side effects.  She had " "an infusion reaction to rituximab in the past (tightness and burning in her throat).            Current Outpatient Medications   Medication     acyclovir (ZOVIRAX) 400 MG tablet     amLODIPine (NORVASC) 5 MG tablet     Ascorbic Acid (VITAMIN C PO)     BREO ELLIPTA 100-25 MCG/INH inhaler     calcium carbonate-vitamin D 600-200 MG-UNIT TABS     estradiol (ESTRACE VAGINAL) 0.1 MG/GM cream     estradiol (VAGIFEM) 10 MCG TABS vaginal tablet     gabapentin (NEURONTIN) 100 MG capsule     glucosamine-chondroitin 500-400 MG CAPS per capsule     hydroxypropyl methylcellulose (GENTEAL) 0.2 % SOLN ophthalmic solution     LACTOBACILLUS ACID-PECTIN PO     leflunomide (ARAVA) 10 MG tablet     melatonin 5 MG tablet     montelukast (SINGULAIR) 10 MG tablet     order for DME     pimecrolimus (ELIDEL) 1 % cream     predniSONE (DELTASONE) 1 MG tablet     RABEprazole (ACIPHEX) 20 MG EC tablet     rosuvastatin (CRESTOR) 5 MG tablet     senna-docusate (SENOKOT-S/PERICOLACE) 8.6-50 MG tablet     triamcinolone (KENALOG) 0.1 % external cream     Turmeric 500 MG TABS     Vitamin D, Cholecalciferol, 1000 units CAPS     zinc gluconate 50 MG tablet     oxyCODONE (ROXICODONE) 5 MG tablet     No current facility-administered medications for this visit.     Allergies   Allergen Reactions     Acetaminophen Anaphylaxis     Influenza Vaccines Anaphylaxis     Rituximab Hives and Itching     Other reaction(s): Breathing Difficulty     Cephalexin Hives     Amitriptyline      PN: LW Reaction: agitation, irritability     Azithromycin Other (See Comments)     \"systemic organ failure\"     Cephalosporins Hives     Cimetidine      PN: LW Reaction: GI Upset, vomiting     Codeine      PN: LW Reaction: Vomiting     Diazepam      hyperactive     Dronabinol Other (See Comments)     Other reaction(s): Headache  Nausea and vomiting     Fluoxetine Other (See Comments)     extreme agitation, cannot be combined for use with cipro     Metronidazole      Generalized " illness     Metronidazole      Other reaction(s): Other - Describe In Comment Field  Extreme pain in legs     Miconazole      vaginal burning     Morphine Sulfate (Concentrate)      nausea and vomiting     Nitrofurantoin      Multiple organ failure     Nortriptyline Other (See Comments)     hyperactivity     Omeprazole      PN: LW Reaction: GI Upset     Polymyxin B      Thimerosal Other (See Comments)     Severe buring, lupus flare     Tioconazole Other (See Comments)     Vaginal burning and bleeding     Erythromycin Rash     malaria  type reaction - fever and hair fell out     Iodine Rash     Needs to have Betadine washed off aftr surgery     Latex Rash     Other reaction(s): Other  redness     Levofloxacin Rash     PN: LW Reaction: Unknown Reaction     Quinolones Rash     happened with levaquin and Ciprofloxacin     Sulfa Drugs Rash     Tetracycline Rash     Past Medical History:   Diagnosis Date     Adverse effect of other specified systemic anti-infectives and antiparasitics, subsequent encounter      Anterior corneal dystrophy      Blepharitis      Bronchiolitis     small airways disease on chest CT 2017, probably related to SLE     Coronary artery disease      Cortical age-related cataract of both eyes      Disseminated retinitis and retinochoroiditis, pigment epitheliopathy      Diverticulitis of colon      GERD (gastroesophageal reflux disease)      Heart murmur     mitral valve disorder     Hypersomnia      ILD (interstitial lung disease) (H)     Mild ILD on chest CT 2017, probably related to SLE     Keratopathy      MGD (meibomian gland dysfunction)      Osteopenia      Pericarditis     due to SLE, s/p pericardial window 2006     Plaquenil causing adverse effect in therapeutic use      Recurrent colitis due to Clostridium difficile      Reflux esophagitis      Restrictive lung disease      SLE (systemic lupus erythematosus related syndrome) (H)     dx in 20s; arthritis, serositis (pericarditis and  pleuritis)     SLE (systemic lupus erythematosus related syndrome) (H)      Tracheostomy in place (H)        Past Surgical History:   Procedure Laterality Date     ARTHROPLASTY KNEE Right 10/2/2018    Procedure: ARTHROPLASTY KNEE;  RIGHT TOTAL KNEE ARTHROPLASTY ;  Surgeon: Kelsie Hardin MD;  Location: [facility]     ARTHROPLASTY KNEE Left 10/1/2019    Procedure: EXAM UNDER ANESTHEISIA  AND LEFT TOTAL KNEE ARTHROPLASTY;  Surgeon: Kelsie Hardin MD;  Location: [facility] OR     CHOLECYSTECTOMY  04/2004     GYN SURGERY  04/2001    LEEP     GYN SURGERY  1985    removal of uterine fibroids     ORTHOPEDIC SURGERY Right     knee cartilage     ORTHOPEDIC SURGERY Left 2005    foot surgery     ORTHOPEDIC SURGERY Left     knee meniscus     ORTHOPEDIC SURGERY Left     thumb     ORTHOPEDIC SURGERY Right 2008    ulnar release     ORTHOPEDIC SURGERY Left 2017    ulnar release     THORACIC SURGERY      periocardiocentesis       Social History     Socioeconomic History     Marital status:      Spouse name: Not on file     Number of children: Not on file     Years of education: Not on file     Highest education level: Not on file   Occupational History     Not on file   Tobacco Use     Smoking status: Never Smoker     Smokeless tobacco: Never Used   Substance and Sexual Activity     Alcohol use: Not Currently     Drug use: Not Currently     Sexual activity: Not on file   Other Topics Concern     Parent/sibling w/ CABG, MI or angioplasty before 65F 55M? Not Asked   Social History Narrative    .  Had exposure to second hand tobacco smoke as a child.    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     Social Determinants of Health     Financial Resource Strain:      Difficulty of Paying Living Expenses:    Food Insecurity:      Worried About Running Out of Food in the Last Year:      Ran Out of Food in the Last Year:    Transportation Needs:      Lack of Transportation (Medical):      Lack of Transportation (Non-Medical):    Physical Activity:      Days of Exercise per Week:      Minutes of Exercise per Session:    Stress:      Feeling of Stress :    Social Connections:      Frequency of Communication with Friends and Family:      Frequency of Social Gatherings with Friends and Family:      Attends U[employer] Services:      Active Member of Clubs or Organizations:      Attends Club or Organization Meetings:      Marital Status:    Intimate Partner Violence:      Fear of Current or Ex-Partner:      Emotionally Abused:      Physically Abused:      Sexually Abused:        Family History   Problem Relation Age of Onset     Sjogren's Mother      Sjogren's Sister              Vitals: There were no vitals taken for this visit.    Exam:   \"GENERAL: Healthy, alert and no distress\",\"EYES: Eyes grossly normal to inspection.  No discharge or erythema, or obvious scleral/conjunctival abnormalities.\",\"RESP: No audible wheeze, cough, or visible cyanosis.  No visible retractions or increased work of breathing.  \",\"SKIN: Visible skin clear. No significant rash, abnormal pigmentation or lesions.\",\"NEURO: Cranial nerves grossly intact.  Mentation and speech appropriate for age.\",\"PSYCH: Mentation appears normal, affect normal/bright, judgement and insight intact, normal speech and appearance well-groomed.\"      Results:  PFT will be performed on August 24.    I reviewed results with the patient.      Assessment and plan:  Ms. 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  "   1.  Bronchiolitis.  Dyspnea on exertion improved with Breo Ellipta.  She is able to use her recumbent exercise bike.  We will continue Breo Ellipta and montelukast.  Her leflunomide and prednisone are managed by Dr. Dewey in rheumatology.  Encouraged her to continue exercising.  Her PFT will be performed on August 24, and I will review that.  If her lung function has declined, then we would most likely repeat high-resolution chest CT scan.  Otherwise, she will return in 1 year with full PFT.  2.  URI symptoms.  I recommended that she get a Covid-19 test if she does not feel better by tomorrow.  She is in agreement with this plan.  3.  ILD.  Chest CT in the past showed mild ILD.  She remains on prednisone and Arava for her lupus, and they are likely treating her ILD.  If her ILD worsens in the future, then possible treatments would include nintedanib.  She has had reactions to mycophenolate and rituximab in the past.  45  minutes spent reviewing chart, talking with and examining patient, formulating plan, and documentation on day of the encounter.

## 2021-08-06 NOTE — LETTER
"    8/6/2021         RE: Saira Pyle  [address] [facility] [address]        Dear Colleague,    Thank you for referring your patient, Saira Pyle, to the [facility]. Please see a copy of my visit note below.    Saira is a 70 year old who is being evaluated via a billable video visit.      How would you like to obtain your AVS? Mail a copy  If the video visit is dropped, the invitation should be resent by: Send to e-mail at: [email or web address]  Will anyone else be joining your video visit? No      Video Start Time:  Patient joined at 11:02AM  Video-Visit Details    Type of service:  Video Visit    Video End Time: 11:15AM    Originating Location (pt. Location): Home    Distant Location (provider location):  [facility]     Platform used for Video Visit: [facility]    Reason for Visit  Saira Pyle is a 70 year old year old female who is being seen for RECHECK (Return video visit )    HPI  Ms. 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    She has follow-up with her rheumatologist Dr. Dewey and saw her recently.  Today, she reports that she has been sick for the past 1 to 2 days.  She endorses sore throat, pain swallowing, fatigue, and nose feels \"a little stuffy\".  She denies fevers, loss of sense of taste or smell, wheezing, chest pain, or new skin rashes.  She reports that her joints are under \"pretty good\" control with leflunomide.  She endorses occasional nocturnal cough which is a little bit worse now.  She continues to use a recumbent exercise bike and denies dyspnea on exertion.    Her medications include Breo Ellipta daily, montelukast daily, prednisone 3 mg daily, and leflunomide.  Breo Ellipta has " "helped her breathing the most.  She did get her COVID-19 vaccine in March.  She reports that she wears a facemask in public.  She does not get the flu vaccine because she has had a reaction to the preservatives in the past.  She also did not tolerate mycophenolate in the past due to GI side effects.  She had an infusion reaction to rituximab in the past (tightness and burning in her throat).            Current Outpatient Medications   Medication     acyclovir (ZOVIRAX) 400 MG tablet     amLODIPine (NORVASC) 5 MG tablet     Ascorbic Acid (VITAMIN C PO)     BREO ELLIPTA 100-25 MCG/INH inhaler     calcium carbonate-vitamin D 600-200 MG-UNIT TABS     estradiol (ESTRACE VAGINAL) 0.1 MG/GM cream     estradiol (VAGIFEM) 10 MCG TABS vaginal tablet     gabapentin (NEURONTIN) 100 MG capsule     glucosamine-chondroitin 500-400 MG CAPS per capsule     hydroxypropyl methylcellulose (GENTEAL) 0.2 % SOLN ophthalmic solution     LACTOBACILLUS ACID-PECTIN PO     leflunomide (ARAVA) 10 MG tablet     melatonin 5 MG tablet     montelukast (SINGULAIR) 10 MG tablet     order for DME     pimecrolimus (ELIDEL) 1 % cream     predniSONE (DELTASONE) 1 MG tablet     RABEprazole (ACIPHEX) 20 MG EC tablet     rosuvastatin (CRESTOR) 5 MG tablet     senna-docusate (SENOKOT-S/PERICOLACE) 8.6-50 MG tablet     triamcinolone (KENALOG) 0.1 % external cream     Turmeric 500 MG TABS     Vitamin D, Cholecalciferol, 1000 units CAPS     zinc gluconate 50 MG tablet     oxyCODONE (ROXICODONE) 5 MG tablet     No current facility-administered medications for this visit.     Allergies   Allergen Reactions     Acetaminophen Anaphylaxis     Influenza Vaccines Anaphylaxis     Rituximab Hives and Itching     Other reaction(s): Breathing Difficulty     Cephalexin Hives     Amitriptyline      PN: LW Reaction: agitation, irritability     Azithromycin Other (See Comments)     \"systemic organ failure\"     Cephalosporins Hives     Cimetidine      PN: LW Reaction: GI " Upset, vomiting     Codeine      PN: LW Reaction: Vomiting     Diazepam      hyperactive     Dronabinol Other (See Comments)     Other reaction(s): Headache  Nausea and vomiting     Fluoxetine Other (See Comments)     extreme agitation, cannot be combined for use with cipro     Metronidazole      Generalized illness     Metronidazole      Other reaction(s): Other - Describe In Comment Field  Extreme pain in legs     Miconazole      vaginal burning     Morphine Sulfate (Concentrate)      nausea and vomiting     Nitrofurantoin      Multiple organ failure     Nortriptyline Other (See Comments)     hyperactivity     Omeprazole      PN: LW Reaction: GI Upset     Polymyxin B      Thimerosal Other (See Comments)     Severe buring, lupus flare     Tioconazole Other (See Comments)     Vaginal burning and bleeding     Erythromycin Rash     malaria  type reaction - fever and hair fell out     Iodine Rash     Needs to have Betadine washed off aftr surgery     Latex Rash     Other reaction(s): Other  redness     Levofloxacin Rash     PN: LW Reaction: Unknown Reaction     Quinolones Rash     happened with levaquin and Ciprofloxacin     Sulfa Drugs Rash     Tetracycline Rash     Past Medical History:   Diagnosis Date     Adverse effect of other specified systemic anti-infectives and antiparasitics, subsequent encounter      Anterior corneal dystrophy      Blepharitis      Bronchiolitis     small airways disease on chest CT 2017, probably related to SLE     Coronary artery disease      Cortical age-related cataract of both eyes      Disseminated retinitis and retinochoroiditis, pigment epitheliopathy      Diverticulitis of colon      GERD (gastroesophageal reflux disease)      Heart murmur     mitral valve disorder     Hypersomnia      ILD (interstitial lung disease) (H)     Mild ILD on chest CT 2017, probably related to SLE     Keratopathy      MGD (meibomian gland dysfunction)      Osteopenia      Pericarditis     due to SLE, s/p  pericardial window 2006     Plaquenil causing adverse effect in therapeutic use      Recurrent colitis due to Clostridium difficile      Reflux esophagitis      Restrictive lung disease      SLE (systemic lupus erythematosus related syndrome) (H)     dx in 20s; arthritis, serositis (pericarditis and pleuritis)     SLE (systemic lupus erythematosus related syndrome) (H)      Tracheostomy in place (H)        Past Surgical History:   Procedure Laterality Date     ARTHROPLASTY KNEE Right 10/2/2018    Procedure: ARTHROPLASTY KNEE;  RIGHT TOTAL KNEE ARTHROPLASTY ;  Surgeon: Kelsie Hardin MD;  Location: [facility] OR     ARTHROPLASTY KNEE Left 10/1/2019    Procedure: EXAM UNDER ANESTHEISIA  AND LEFT TOTAL KNEE ARTHROPLASTY;  Surgeon: Kelsie Hardin MD;  Location: [facility] OR     CHOLECYSTECTOMY  04/2004     GYN SURGERY  04/2001    LEEP     GYN SURGERY  1985    removal of uterine fibroids     ORTHOPEDIC SURGERY Right     knee cartilage     ORTHOPEDIC SURGERY Left 2005    foot surgery     ORTHOPEDIC SURGERY Left     knee meniscus     ORTHOPEDIC SURGERY Left     thumb     ORTHOPEDIC SURGERY Right 2008    ulnar release     ORTHOPEDIC SURGERY Left 2017    ulnar release     THORACIC SURGERY      periocardiocentesis       Social History     Socioeconomic History     Marital status:      Spouse name: Not on file     Number of children: Not on file     Years of education: Not on file     Highest education level: Not on file   Occupational History     Not on file   Tobacco Use     Smoking status: Never Smoker     Smokeless tobacco: Never Used   Substance and Sexual Activity     Alcohol use: Not Currently     Drug use: Not Currently     Sexual activity: Not on file   Other Topics Concern     Parent/sibling w/ CABG, MI or angioplasty before 65F 55M? Not Asked   Social History Narrative    .  Had exposure to second hand tobacco smoke as a child.    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     Social Determinants of Health     Financial Resource Strain:      Difficulty of Paying Living Expenses:    Food Insecurity:      Worried About Running Out of Food in the Last Year:      Ran Out of Food in the Last Year:    Transportation Needs:      Lack of Transportation (Medical):      Lack of Transportation (Non-Medical):    Physical Activity:      Days of Exercise per Week:      Minutes of Exercise per Session:    Stress:      Feeling of Stress :    Social Connections:      Frequency of Communication with Friends and Family:      Frequency of Social Gatherings with Friends and Family:      Attends Rastafari Services:      Active Member of Clubs or Organizations:      Attends Club or Organization Meetings:      Marital Status:    Intimate Partner Violence:      Fear of Current or Ex-Partner:      Emotionally Abused:      Physically Abused:      Sexually Abused:        Family History   Problem Relation Age of Onset     Sjogren's Mother      Sjogren's Sister              Vitals: There were no vitals taken for this visit.    Exam:   \"GENERAL: Healthy, alert and no distress\",\"EYES: Eyes grossly normal to inspection.  No discharge or erythema, or obvious scleral/conjunctival abnormalities.\",\"RESP: No audible wheeze, cough, or visible cyanosis.  No visible retractions or increased work of breathing.  \",\"SKIN: Visible skin clear. No significant rash, abnormal pigmentation or lesions.\",\"NEURO: Cranial nerves grossly intact.  Mentation and speech appropriate for age.\",\"PSYCH: Mentation appears normal, affect normal/bright, judgement and insight " "intact, normal speech and appearance well-groomed.\"      Results:  PFT will be performed on August 24.    I reviewed results with the patient.      Assessment and plan:  Ms. 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    1.  Bronchiolitis.  Dyspnea on exertion improved with Breo Ellipta.  She is able to use her recumbent exercise bike.  We will continue Breo Ellipta and montelukast.  Her leflunomide and prednisone are managed by Dr. Dewey in rheumatology.  Encouraged her to continue exercising.  Her PFT will be performed on August 24, and I will review that.  If her lung function has declined, then we would most likely repeat high-resolution chest CT scan.  Otherwise, she will return in 1 year with full PFT.  2.  URI symptoms.  I recommended that she get a Covid-19 test if she does not feel better by tomorrow.  She is in agreement with this plan.  3.  ILD.  Chest CT in the past showed mild ILD.  She remains on prednisone and Arava for her lupus, and they are likely treating her ILD.  If her ILD worsens in the future, then possible treatments would include nintedanib.  She has had reactions to mycophenolate and rituximab in the past.  45  minutes spent reviewing chart, talking with and examining patient, formulating plan, and documentation on day of the encounter.              Again, thank you for allowing me to participate in the care of your patient.        Sincerely,        Petra Valentin MD    "

## 2021-08-24 DIAGNOSIS — J84.9 ILD (INTERSTITIAL LUNG DISEASE) (H): ICD-10-CM

## 2021-08-24 LAB
DLCOUNC-%PRED-PRE: 75 %
DLCOUNC-PRE: 13.37 ML/MIN/MMHG
DLCOUNC-PRED: 17.6 ML/MIN/MMHG
ERV-%PRED-PRE: 80 %
ERV-PRE: 0.62 L
ERV-PRED: 0.76 L
EXPTIME-PRE: 6.85 SEC
FEF2575-%PRED-PRE: 92 %
FEF2575-PRE: 1.65 L/SEC
FEF2575-PRED: 1.78 L/SEC
FEFMAX-%PRED-PRE: 125 %
FEFMAX-PRE: 6.63 L/SEC
FEFMAX-PRED: 5.27 L/SEC
FEV1-%PRED-PRE: 79 %
FEV1-PRE: 1.61 L
FEV1FEV6-PRE: 82 %
FEV1FEV6-PRED: 79 %
FEV1FVC-PRE: 82 %
FEV1FVC-PRED: 79 %
FEV1SVC-PRE: 81 %
FEV1SVC-PRED: 75 %
FIFMAX-PRE: 5.82 L/SEC
FRCPLETH-%PRED-PRE: 69 %
FRCPLETH-PRE: 1.77 L
FRCPLETH-PRED: 2.54 L
FVC-%PRED-PRE: 76 %
FVC-PRE: 1.98 L
FVC-PRED: 2.58 L
IC-%PRED-PRE: 70 %
IC-PRE: 1.36 L
IC-PRED: 1.94 L
RVPLETH-%PRED-PRE: 60 %
RVPLETH-PRE: 1.16 L
RVPLETH-PRED: 1.91 L
TLCPLETH-%PRED-PRE: 70 %
TLCPLETH-PRE: 3.14 L
TLCPLETH-PRED: 4.44 L
VA-%PRED-PRE: 68 %
VA-PRE: 2.84 L
VC-%PRED-PRE: 73 %
VC-PRE: 1.98 L
VC-PRED: 2.7 L

## 2021-08-24 PROCEDURE — 94729 DIFFUSING CAPACITY: CPT | Performed by: INTERNAL MEDICINE

## 2021-08-24 PROCEDURE — 94726 PLETHYSMOGRAPHY LUNG VOLUMES: CPT | Performed by: INTERNAL MEDICINE

## 2021-08-24 PROCEDURE — 94375 RESPIRATORY FLOW VOLUME LOOP: CPT | Performed by: INTERNAL MEDICINE

## 2021-09-21 DIAGNOSIS — J21.9 BRONCHIOLITIS: ICD-10-CM

## 2021-09-21 RX ORDER — MONTELUKAST SODIUM 10 MG/1
TABLET ORAL
Qty: 30 TABLET | Refills: 3 | Status: SHIPPED | OUTPATIENT
Start: 2021-09-21 | End: 2022-02-04

## 2021-11-19 ENCOUNTER — TRANSFERRED RECORDS (OUTPATIENT)
Dept: HEALTH INFORMATION MANAGEMENT | Facility: CLINIC | Age: 71
End: 2021-11-19
Payer: COMMERCIAL

## 2021-12-14 DIAGNOSIS — J21.9 BRONCHIOLITIS: ICD-10-CM

## 2022-02-04 DIAGNOSIS — J21.9 BRONCHIOLITIS: ICD-10-CM

## 2022-02-04 RX ORDER — MONTELUKAST SODIUM 10 MG/1
TABLET ORAL
Qty: 30 TABLET | Refills: 2 | Status: SHIPPED | OUTPATIENT
Start: 2022-02-04 | End: 2022-05-09

## 2022-04-22 ENCOUNTER — OFFICE VISIT (OUTPATIENT)
Dept: PULMONOLOGY | Facility: CLINIC | Age: 72
End: 2022-04-22
Attending: INTERNAL MEDICINE
Payer: COMMERCIAL

## 2022-04-22 VITALS
HEIGHT: 61 IN | BODY MASS INDEX: 20.77 KG/M2 | HEART RATE: 84 BPM | SYSTOLIC BLOOD PRESSURE: 135 MMHG | DIASTOLIC BLOOD PRESSURE: 79 MMHG | OXYGEN SATURATION: 97 % | WEIGHT: 110 LBS

## 2022-04-22 DIAGNOSIS — J84.9 INTERSTITIAL LUNG DISEASE (H): ICD-10-CM

## 2022-04-22 DIAGNOSIS — J21.9 BRONCHIOLITIS: ICD-10-CM

## 2022-04-22 DIAGNOSIS — J84.9 ILD (INTERSTITIAL LUNG DISEASE) (H): ICD-10-CM

## 2022-04-22 DIAGNOSIS — J98.4 SMALL AIRWAYS DISEASE: Primary | ICD-10-CM

## 2022-04-22 PROCEDURE — 94729 DIFFUSING CAPACITY: CPT | Performed by: INTERNAL MEDICINE

## 2022-04-22 PROCEDURE — 94726 PLETHYSMOGRAPHY LUNG VOLUMES: CPT | Performed by: INTERNAL MEDICINE

## 2022-04-22 PROCEDURE — 94375 RESPIRATORY FLOW VOLUME LOOP: CPT | Performed by: INTERNAL MEDICINE

## 2022-04-22 PROCEDURE — G0463 HOSPITAL OUTPT CLINIC VISIT: HCPCS | Mod: 25

## 2022-04-22 PROCEDURE — 99214 OFFICE O/P EST MOD 30 MIN: CPT | Mod: 25 | Performed by: INTERNAL MEDICINE

## 2022-04-22 ASSESSMENT — PAIN SCALES - GENERAL: PAINLEVEL: NO PAIN (0)

## 2022-04-22 NOTE — LETTER
4/22/2022         RE: Saira Pyle  [address] [facility] [id number]        Dear Colleague,    Thank you for referring your patient, Saira Pyle, to the [facility] [address]. Please see a copy of my visit note below.    Date of service: April 22, 2022    [facility]    Reason for Visit  Saira Pyle is a 71 year old year old female who is being seen for Interstitial Lung Disease (ILD) (ILD HX Bronchiactis )    HPI  Ms. Bhaskar Pyle is a 71-year-old with mild ILD and bronchiolitis associated with her lupus. She follows with rheumatologist Dr. Dewey. Today, she reports that her muscles are sore. She has joint stiffness that lasts for about an hour. She reports that her breathing has remained stable. She continues to use stationary bike without any breathing difficulty. She had COVID infection in March, 2022. She would get her 4th COVID vaccine dose in June, 2022.    Her medications include Breo Ellipta daily, montelukast daily, prednisone 3 mg daily, and leflunomide.  Breo Ellipta has helped her breathing the most. She reports that she wears a facemask in public. She does not get the flu vaccine because she has had a reaction to the preservatives in the past.  She also did not tolerate mycophenolate in the past due to GI side effects.  She had an infusion reaction to rituximab in the past (tightness and burning in her throat).      Current Outpatient Medications   Medication     acyclovir (ZOVIRAX) 400 MG tablet     amLODIPine (NORVASC) 5 MG tablet     Ascorbic Acid (VITAMIN C PO)     BREO ELLIPTA 100-25 MCG/INH inhaler     calcium carbonate-vitamin D 600-200 MG-UNIT TABS     estradiol (ESTRACE) 0.1 MG/GM vaginal cream     estradiol (VAGIFEM) 10 MCG TABS vaginal tablet     gabapentin (NEURONTIN) 100 MG capsule     glucosamine-chondroitin 500-400 MG CAPS per capsule      "hydroxypropyl methylcellulose (GENTEAL) 0.2 % SOLN ophthalmic solution     LACTOBACILLUS ACID-PECTIN PO     leflunomide (ARAVA) 10 MG tablet     melatonin 5 MG tablet     montelukast (SINGULAIR) 10 MG tablet     order for DME     oxyCODONE (ROXICODONE) 5 MG tablet     pimecrolimus (ELIDEL) 1 % cream     predniSONE (DELTASONE) 1 MG tablet     RABEprazole (ACIPHEX) 20 MG EC tablet     rosuvastatin (CRESTOR) 5 MG tablet     senna-docusate (SENOKOT-S/PERICOLACE) 8.6-50 MG tablet     triamcinolone (KENALOG) 0.1 % external cream     Turmeric 500 MG TABS     Vitamin D, Cholecalciferol, 1000 units CAPS     zinc gluconate 50 MG tablet     No current facility-administered medications for this visit.     Allergies   Allergen Reactions     Acetaminophen Anaphylaxis     Influenza Vaccines Anaphylaxis     Rituximab Hives and Itching     Other reaction(s): Breathing Difficulty     Cephalexin Hives     Amitriptyline      PN: LW Reaction: agitation, irritability     Azithromycin Other (See Comments)     \"systemic organ failure\"     Cephalosporins Hives     Cimetidine      PN: LW Reaction: GI Upset, vomiting     Codeine      PN: LW Reaction: Vomiting     Diazepam      hyperactive     Dronabinol Other (See Comments)     Other reaction(s): Headache  Nausea and vomiting     Fluoxetine Other (See Comments)     extreme agitation, cannot be combined for use with cipro     Metronidazole      Generalized illness     Metronidazole      Other reaction(s): Other - Describe In Comment Field  Extreme pain in legs     Miconazole      vaginal burning     Morphine Sulfate (Concentrate)      nausea and vomiting     Nitrofurantoin      Multiple organ failure     Nortriptyline Other (See Comments)     hyperactivity     Omeprazole      PN: LW Reaction: GI Upset     Oxycodone Other (See Comments)     Sensitive to taking oxycodone      Polymyxin B      Thimerosal Other (See Comments)     Severe buring, lupus flare     Tioconazole Other (See Comments)     " Vaginal burning and bleeding     Erythromycin Rash     malaria  type reaction - fever and hair fell out     Iodine Rash     Needs to have Betadine washed off aftr surgery     Latex Rash     Other reaction(s): Other  redness     Levofloxacin Rash     PN: LW Reaction: Unknown Reaction     Quinolones Rash     happened with levaquin and Ciprofloxacin     Sulfa Drugs Rash     Tetracycline Rash     Past Medical History:   Diagnosis Date     Adverse effect of other specified systemic anti-infectives and antiparasitics, subsequent encounter      Anterior corneal dystrophy      Blepharitis      Bronchiolitis     small airways disease on chest CT 2017, probably related to SLE     Coronary artery disease      Cortical age-related cataract of both eyes      Disseminated retinitis and retinochoroiditis, pigment epitheliopathy      Diverticulitis of colon      GERD (gastroesophageal reflux disease)      Heart murmur     mitral valve disorder     Hypersomnia      ILD (interstitial lung disease) (H)     Mild ILD on chest CT 2017, probably related to SLE     Keratopathy      MGD (meibomian gland dysfunction)      Osteopenia      Pericarditis     due to SLE, s/p pericardial window 2006     Plaquenil causing adverse effect in therapeutic use      Recurrent colitis due to Clostridium difficile      Reflux esophagitis      Restrictive lung disease      SLE (systemic lupus erythematosus related syndrome) (H)     dx in 20s; arthritis, serositis (pericarditis and pleuritis)     SLE (systemic lupus erythematosus related syndrome) (H)      Tracheostomy in place (H)        Past Surgical History:   Procedure Laterality Date     ARTHROPLASTY KNEE Right 10/2/2018    Procedure: ARTHROPLASTY KNEE;  RIGHT TOTAL KNEE ARTHROPLASTY ;  Surgeon: Kelsie Hardin MD;  Location:  OR     ARTHROPLASTY KNEE Left 10/1/2019    Procedure: EXAM UNDER ANESTHEISIA  AND LEFT TOTAL KNEE ARTHROPLASTY;  Surgeon: Kelsie Hardin MD;  Location:  OR      CHOLECYSTECTOMY  04/2004     GYN SURGERY  04/2001    LEEP     GYN SURGERY  1985    removal of uterine fibroids     ORTHOPEDIC SURGERY Right     knee cartilage     ORTHOPEDIC SURGERY Left 2005    foot surgery     ORTHOPEDIC SURGERY Left     knee meniscus     ORTHOPEDIC SURGERY Left     thumb     ORTHOPEDIC SURGERY Right 2008    ulnar release     ORTHOPEDIC SURGERY Left 2017    ulnar release     THORACIC SURGERY      periocardiocentesis       Social History     Socioeconomic History     Marital status:      Spouse name: Not on file     Number of children: Not on file     Years of education: Not on file     Highest education level: Not on file   Occupational History     Not on file   Tobacco Use     Smoking status: Never Smoker     Smokeless tobacco: Never Used   Substance and Sexual Activity     Alcohol use: Not Currently     Drug use: Not Currently     Sexual activity: Not on file   Other Topics Concern     Parent/sibling w/ CABG, MI or angioplasty before 65F 55M? Not Asked   Social History Narrative    .  Had exposure to second hand tobacco smoke as a child.    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     Social Determinants of Health     Financial Resource Strain: Not on file   Food Insecurity: Not on file   Transportation Needs: Not on file   Physical Activity: Not on file   Stress: Not on file   Social Connections: Not on file   Intimate Partner Violence: Not on file   Housing Stability: Not on file       Family History   Problem Relation Age of Onset     Sjogren's Mother      Sjogren's Sister   "      Vitals: /79 (BP Location: Right arm, Cuff Size: Adult Small)   Pulse 84   Ht 1.537 m (5' 0.5\")   Wt 49.9 kg (110 lb)   SpO2 97%   BMI 21.13 kg/m      Exam:   GENERAL: Healthy, alert and no distress  EYES: Eyes grossly normal to inspection.  No discharge or erythema, or obvious scleral/conjunctival abnormalities.  RESP: No audible wheeze, cough, or visible cyanosis.  No visible retractions or increased work of breathing.    SKIN: Visible skin clear. No significant rash, abnormal pigmentation or lesions.  NEURO: Cranial nerves grossly intact.  Mentation and speech appropriate for age.  PSYCH: Mentation appears normal, affect normal/bright, judgement and insight intact, normal speech and appearance well-groomed.    Results:  PFT 4/22/2022 review with the patient       Assessment and plan:  Ms. Bhaskar Pyle is a 71-year-old with mild ILD and bronchiolitis associated with her lupus.    1. Bronchiolitis.  Dyspnea on exertion improved with Breo Ellipta.  She is able to use her recumbent exercise bike.  We will continue Breo Ellipta and montelukast.  Her leflunomide and prednisone are managed by Dr. Dewey in rheumatology.  Encouraged her to continue exercising.   PFT is stable today.  If her lung function declines in the future, then we would most likely repeat high-resolution chest CT scan. She will follow up with repeat PFT in 6 months.  2. ILD.  Chest CT in the past showed mild ILD.  She remains on prednisone and Arava for her lupus, and they could be treating her ILD.  If her ILD worsens in the future, then possible treatments would include nintedanib. She has had reactions to mycophenolate and rituximab in the past.  3. She will get second booster of COVID -19 vaccine in June, 2022    Patient discussed and staffed with MD Alvarez Brown,   Rheumatology fellow up      I saw and evaluated patient with Fellow.  Case discussed - agree with note.  I reviewed pulmonary function test that was " performed today.  This shows normal spirometry, lung volumes and diffusing capacity.  Lung function is stable.  There is no need to change her immunosuppression at this time.  There are other options to treat ILD in the future if her ILD worsens.    PETRA KELLY M.D.              Again, thank you for allowing me to participate in the care of your patient.        Sincerely,        Petra Kelly MD

## 2022-04-22 NOTE — NURSING NOTE
Chief Complaint   Patient presents with     Interstitial Lung Disease (ILD)     ILD HX Bronchiactis      Vitals were taken and medications were reconciled.     Therese Higgins RMA  10:56 AM

## 2022-04-22 NOTE — PROGRESS NOTES
Date of service: April 22, 2022    [facility]    Reason for Visit  Saira Pyle is a 71 year old year old female who is being seen for Interstitial Lung Disease (ILD) (ILD HX Bronchiactis )    HPI  Ms. Bhaskar Pyle is a 71-year-old with mild ILD and bronchiolitis associated with her lupus. She follows with rheumatologist Dr. Dewey. Today, she reports that her muscles are sore. She has joint stiffness that lasts for about an hour. She reports that her breathing has remained stable. She continues to use stationary bike without any breathing difficulty. She had COVID infection in March, 2022. She would get her 4th COVID vaccine dose in June, 2022.    Her medications include Breo Ellipta daily, montelukast daily, prednisone 3 mg daily, and leflunomide.  Breo Ellipta has helped her breathing the most. She reports that she wears a facemask in public. She does not get the flu vaccine because she has had a reaction to the preservatives in the past.  She also did not tolerate mycophenolate in the past due to GI side effects.  She had an infusion reaction to rituximab in the past (tightness and burning in her throat).      Current Outpatient Medications   Medication     acyclovir (ZOVIRAX) 400 MG tablet     amLODIPine (NORVASC) 5 MG tablet     Ascorbic Acid (VITAMIN C PO)     BREO ELLIPTA 100-25 MCG/INH inhaler     calcium carbonate-vitamin D 600-200 MG-UNIT TABS     estradiol (ESTRACE) 0.1 MG/GM vaginal cream     estradiol (VAGIFEM) 10 MCG TABS vaginal tablet     gabapentin (NEURONTIN) 100 MG capsule     glucosamine-chondroitin 500-400 MG CAPS per capsule     hydroxypropyl methylcellulose (GENTEAL) 0.2 % SOLN ophthalmic solution     LACTOBACILLUS ACID-PECTIN PO     leflunomide (ARAVA) 10 MG tablet     melatonin 5 MG tablet     montelukast (SINGULAIR) 10 MG tablet     order for DME     oxyCODONE (ROXICODONE) 5 MG tablet     pimecrolimus (ELIDEL) 1 % cream      "predniSONE (DELTASONE) 1 MG tablet     RABEprazole (ACIPHEX) 20 MG EC tablet     rosuvastatin (CRESTOR) 5 MG tablet     senna-docusate (SENOKOT-S/PERICOLACE) 8.6-50 MG tablet     triamcinolone (KENALOG) 0.1 % external cream     Turmeric 500 MG TABS     Vitamin D, Cholecalciferol, 1000 units CAPS     zinc gluconate 50 MG tablet     No current facility-administered medications for this visit.     Allergies   Allergen Reactions     Acetaminophen Anaphylaxis     Influenza Vaccines Anaphylaxis     Rituximab Hives and Itching     Other reaction(s): Breathing Difficulty     Cephalexin Hives     Amitriptyline      PN: LW Reaction: agitation, irritability     Azithromycin Other (See Comments)     \"systemic organ failure\"     Cephalosporins Hives     Cimetidine      PN: LW Reaction: GI Upset, vomiting     Codeine      PN: LW Reaction: Vomiting     Diazepam      hyperactive     Dronabinol Other (See Comments)     Other reaction(s): Headache  Nausea and vomiting     Fluoxetine Other (See Comments)     extreme agitation, cannot be combined for use with cipro     Metronidazole      Generalized illness     Metronidazole      Other reaction(s): Other - Describe In Comment Field  Extreme pain in legs     Miconazole      vaginal burning     Morphine Sulfate (Concentrate)      nausea and vomiting     Nitrofurantoin      Multiple organ failure     Nortriptyline Other (See Comments)     hyperactivity     Omeprazole      PN: LW Reaction: GI Upset     Oxycodone Other (See Comments)     Sensitive to taking oxycodone      Polymyxin B      Thimerosal Other (See Comments)     Severe buring, lupus flare     Tioconazole Other (See Comments)     Vaginal burning and bleeding     Erythromycin Rash     malaria  type reaction - fever and hair fell out     Iodine Rash     Needs to have Betadine washed off aftr surgery     Latex Rash     Other reaction(s): Other  redness     Levofloxacin Rash     PN: LW Reaction: Unknown Reaction     Quinolones Rash "     happened with levaquin and Ciprofloxacin     Sulfa Drugs Rash     Tetracycline Rash     Past Medical History:   Diagnosis Date     Adverse effect of other specified systemic anti-infectives and antiparasitics, subsequent encounter      Anterior corneal dystrophy      Blepharitis      Bronchiolitis     small airways disease on chest CT 2017, probably related to SLE     Coronary artery disease      Cortical age-related cataract of both eyes      Disseminated retinitis and retinochoroiditis, pigment epitheliopathy      Diverticulitis of colon      GERD (gastroesophageal reflux disease)      Heart murmur     mitral valve disorder     Hypersomnia      ILD (interstitial lung disease) (H)     Mild ILD on chest CT 2017, probably related to SLE     Keratopathy      MGD (meibomian gland dysfunction)      Osteopenia      Pericarditis     due to SLE, s/p pericardial window 2006     Plaquenil causing adverse effect in therapeutic use      Recurrent colitis due to Clostridium difficile      Reflux esophagitis      Restrictive lung disease      SLE (systemic lupus erythematosus related syndrome) (H)     dx in 20s; arthritis, serositis (pericarditis and pleuritis)     SLE (systemic lupus erythematosus related syndrome) (H)      Tracheostomy in place (H)        Past Surgical History:   Procedure Laterality Date     ARTHROPLASTY KNEE Right 10/2/2018    Procedure: ARTHROPLASTY KNEE;  RIGHT TOTAL KNEE ARTHROPLASTY ;  Surgeon: Kelsie Hardin MD;  Location:  OR     ARTHROPLASTY KNEE Left 10/1/2019    Procedure: EXAM UNDER ANESTHEISIA  AND LEFT TOTAL KNEE ARTHROPLASTY;  Surgeon: Kelsie Hardin MD;  Location:  OR     CHOLECYSTECTOMY  04/2004     GYN SURGERY  04/2001    [facility]     GYN SURGERY  1985    removal of uterine fibroids     ORTHOPEDIC SURGERY Right     knee cartilage     ORTHOPEDIC SURGERY Left 2005    foot surgery     ORTHOPEDIC SURGERY Left     knee meniscus     ORTHOPEDIC SURGERY Left     thumb     ORTHOPEDIC  "SURGERY Right 2008    ulnar release     ORTHOPEDIC SURGERY Left 2017    ulnar release     THORACIC SURGERY      periocardiocentesis       Social History     Socioeconomic History     Marital status:      Spouse name: Not on file     Number of children: Not on file     Years of education: Not on file     Highest education level: Not on file   Occupational History     Not on file   Tobacco Use     Smoking status: Never Smoker     Smokeless tobacco: Never Used   Substance and Sexual Activity     Alcohol use: Not Currently     Drug use: Not Currently     Sexual activity: Not on file   Other Topics Concern     Parent/sibling w/ CABG, MI or angioplasty before 65F 55M? Not Asked   Social History Narrative    .  Had exposure to second hand tobacco smoke as a child.    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     Social Determinants of Health     Financial Resource Strain: Not on file   Food Insecurity: Not on file   Transportation Needs: Not on file   Physical Activity: Not on file   Stress: Not on file   Social Connections: Not on file   Intimate Partner Violence: Not on file   Housing Stability: Not on file       Family History   Problem Relation Age of Onset     Sjogren's Mother      Sjogren's Sister        Vitals: /79 (BP Location: Right arm, Cuff Size: Adult Small)   Pulse 84   Ht 1.537 m (5' 0.5\")   Wt 49.9 kg (110 lb)   SpO2 97%   BMI 21.13 kg/m      Exam:   GENERAL: Healthy, alert and no distress  EYES: Eyes grossly normal to inspection.  No discharge or erythema, or obvious " scleral/conjunctival abnormalities.  RESP: No audible wheeze, cough, or visible cyanosis.  No visible retractions or increased work of breathing.    SKIN: Visible skin clear. No significant rash, abnormal pigmentation or lesions.  NEURO: Cranial nerves grossly intact.  Mentation and speech appropriate for age.  PSYCH: Mentation appears normal, affect normal/bright, judgement and insight intact, normal speech and appearance well-groomed.    Results:  PFT 4/22/2022 review with the patient       Assessment and plan:  Ms. Bhaskar Pyle is a 71-year-old with mild ILD and bronchiolitis associated with her lupus.    1. Bronchiolitis.  Dyspnea on exertion improved with Breo Ellipta.  She is able to use her recumbent exercise bike.  We will continue Breo Ellipta and montelukast.  Her leflunomide and prednisone are managed by Dr. Dewey in rheumatology.  Encouraged her to continue exercising.   PFT is stable today.  If her lung function declines in the future, then we would most likely repeat high-resolution chest CT scan. She will follow up with repeat PFT in 6 months.  2. ILD.  Chest CT in the past showed mild ILD.  She remains on prednisone and Arava for her lupus, and they could be treating her ILD.  If her ILD worsens in the future, then possible treatments would include nintedanib. She has had reactions to mycophenolate and rituximab in the past.  3. She will get second booster of COVID -19 vaccine in June, 2022    Patient discussed and staffed with MD Alvarez Brown,   Rheumatology fellow up      I saw and evaluated patient with Fellow.  Case discussed - agree with note.  I reviewed pulmonary function test that was performed today.  This shows normal spirometry, lung volumes and diffusing capacity.  Lung function is stable.  There is no need to change her immunosuppression at this time.  There are other options to treat ILD in the future if her ILD worsens.    KESHAWN KELLY M.D.

## 2022-04-26 LAB
DLCOUNC-%PRED-PRE: 83 %
DLCOUNC-PRE: 14.49 ML/MIN/MMHG
DLCOUNC-PRED: 17.29 ML/MIN/MMHG
ERV-%PRED-PRE: 65 %
ERV-PRE: 0.53 L
ERV-PRED: 0.81 L
EXPTIME-PRE: 6.56 SEC
FEF2575-%PRED-PRE: 85 %
FEF2575-PRE: 1.51 L/SEC
FEF2575-PRED: 1.76 L/SEC
FEFMAX-%PRED-PRE: 130 %
FEFMAX-PRE: 6.8 L/SEC
FEFMAX-PRED: 5.2 L/SEC
FEV1-%PRED-PRE: 81 %
FEV1-PRE: 1.62 L
FEV1FEV6-PRE: 80 %
FEV1FEV6-PRED: 79 %
FEV1FVC-PRE: 80 %
FEV1FVC-PRED: 79 %
FEV1SVC-PRE: 82 %
FEV1SVC-PRED: 75 %
FIFMAX-PRE: 5.75 L/SEC
FRCPLETH-%PRED-PRE: 81 %
FRCPLETH-PRE: 2.06 L
FRCPLETH-PRED: 2.51 L
FVC-%PRED-PRE: 80 %
FVC-PRE: 2.03 L
FVC-PRED: 2.54 L
IC-%PRED-PRE: 78 %
IC-PRE: 1.43 L
IC-PRED: 1.83 L
RVPLETH-%PRED-PRE: 80 %
RVPLETH-PRE: 1.52 L
RVPLETH-PRED: 1.89 L
TLCPLETH-%PRED-PRE: 80 %
TLCPLETH-PRE: 3.49 L
TLCPLETH-PRED: 4.35 L
VA-%PRED-PRE: 71 %
VA-PRE: 2.9 L
VC-%PRED-PRE: 74 %
VC-PRE: 1.97 L
VC-PRED: 2.64 L

## 2022-05-09 ENCOUNTER — TELEPHONE (OUTPATIENT)
Dept: PULMONOLOGY | Facility: CLINIC | Age: 72
End: 2022-05-09
Payer: COMMERCIAL

## 2022-05-09 DIAGNOSIS — J21.9 BRONCHIOLITIS: ICD-10-CM

## 2022-05-09 RX ORDER — MONTELUKAST SODIUM 10 MG/1
10 TABLET ORAL AT BEDTIME
Qty: 90 TABLET | Refills: 1 | Status: SHIPPED | OUTPATIENT
Start: 2022-05-09 | End: 2022-11-08

## 2022-05-09 NOTE — TELEPHONE ENCOUNTER
[facility] Call Center    Phone Message    May a detailed message be left on voicemail: yes     Reason for Call: Medication Refill Request    Has the patient contacted the pharmacy for the refill? Yes   Name of medication being requested: montelukast (SINGULAIR) 10 MG tablet  Provider who prescribed the medication: Dr Valentin  Pharmacy: [id number] [address]  Date medication is needed: ASAP  - pt is requesting a 90 day fill. With refills.            Action Taken: Message routed to:  [facility] Pulm    Travel Screening: Not Applicable                                                                       Patient

## 2022-05-31 NOTE — PLAN OF CARE
Problem: Patient Care Overview  Goal: Plan of Care/Patient Progress Review  Occupational Therapy Discharge Summary    Reason for therapy discharge:    Home with family assist     Progress towards therapy goal(s). See goals on Care Plan in [facility] electronic health record for goal details.  Goals not met     Therapy recommendation(s):    Home with A from spouse with ADL/IADL's as needed          stated

## 2022-06-21 ENCOUNTER — TELEPHONE (OUTPATIENT)
Dept: PULMONOLOGY | Facility: CLINIC | Age: 72
End: 2022-06-21

## 2022-06-21 DIAGNOSIS — J84.9 ILD (INTERSTITIAL LUNG DISEASE) (H): Primary | ICD-10-CM

## 2022-06-21 DIAGNOSIS — J98.4 SMALL AIRWAYS DISEASE: ICD-10-CM

## 2022-06-21 DIAGNOSIS — J21.9 BRONCHIOLITIS: ICD-10-CM

## 2022-06-21 NOTE — TELEPHONE ENCOUNTER
[facility] Call Center    Phone Message    May a detailed message be left on voicemail: yes     Reason for Call: Other: Per pt went to  the medication montelukast (SINGULAIR) 10 MG tablet today and she had to pay for the medication since her insurance doesnt cover this mediction anymore. Per pt the pharmacy gave pt a few names that are equivalent. Please call pt back to discuss. Thank you!    Action Taken: Message routed to:  [facility] PULM    Travel Screening: Not Applicable

## 2022-06-22 NOTE — TELEPHONE ENCOUNTER
Spoke with patient and medication BREO is no longer on formulary. Pt has been taking for past 3 years. The other medications covered by her insurance- Symbicort (she tried and did not tolerate), Advair and Dulera. RN will message Dr. Valentin regarding medication and update pt once we have heard back.   Maris Andrews RN BSN

## 2022-07-12 NOTE — PROGRESS NOTES
Pt reports fire dept at home call later   SP[facility] Progress Note  [facility]    Visited per request.     Pt and SH exchanged stories. Pt shared that she has had a few death experiences, sharing one where she saw her life flash before her, seeing the afterlife, feeling loved, seeing friends and family, and different colored lights. Pt understood God and Heaven in this experience. Pt and SH exchanged theological dialogue.    SH provided and kind and caring presence. SH provided listening and reflective questions.        SH will follow and visit if hospital stay persists.        Dann Mercado  Chaplain Resident

## 2022-11-08 DIAGNOSIS — J21.9 BRONCHIOLITIS: ICD-10-CM

## 2022-11-08 RX ORDER — MONTELUKAST SODIUM 10 MG/1
10 TABLET ORAL AT BEDTIME
Qty: 90 TABLET | Refills: 1 | Status: SHIPPED | OUTPATIENT
Start: 2022-11-08 | End: 2023-05-08

## 2023-02-10 DIAGNOSIS — J21.9 BRONCHIOLITIS: ICD-10-CM

## 2023-02-10 PROCEDURE — 94729 DIFFUSING CAPACITY: CPT | Performed by: INTERNAL MEDICINE

## 2023-02-10 PROCEDURE — 94375 RESPIRATORY FLOW VOLUME LOOP: CPT | Performed by: INTERNAL MEDICINE

## 2023-02-10 PROCEDURE — 94726 PLETHYSMOGRAPHY LUNG VOLUMES: CPT | Performed by: INTERNAL MEDICINE

## 2023-02-13 LAB
DLCOUNC-%PRED-PRE: 73 %
DLCOUNC-PRE: 12.67 ML/MIN/MMHG
DLCOUNC-PRED: 17.23 ML/MIN/MMHG
ERV-%PRED-PRE: 54 %
ERV-PRE: 0.4 L
ERV-PRED: 0.73 L
EXPTIME-PRE: 5.6 SEC
FEF2575-%PRED-PRE: 125 %
FEF2575-PRE: 2.06 L/SEC
FEF2575-PRED: 1.64 L/SEC
FEFMAX-%PRED-PRE: 130 %
FEFMAX-PRE: 6.66 L/SEC
FEFMAX-PRED: 5.12 L/SEC
FEV1-%PRED-PRE: 85 %
FEV1-PRE: 1.56 L
FEV1FEV6-PRE: 86 %
FEV1FEV6-PRED: 79 %
FEV1FVC-PRE: 86 %
FEV1FVC-PRED: 79 %
FEV1SVC-PRE: 80 %
FEV1SVC-PRED: 70 %
FIFMAX-PRE: 4.77 L/SEC
FRCPLETH-%PRED-PRE: 76 %
FRCPLETH-PRE: 1.93 L
FRCPLETH-PRED: 2.51 L
FVC-%PRED-PRE: 78 %
FVC-PRE: 1.82 L
FVC-PRED: 2.31 L
IC-%PRED-PRE: 81 %
IC-PRE: 1.54 L
IC-PRED: 1.89 L
RVPLETH-%PRED-PRE: 80 %
RVPLETH-PRE: 1.54 L
RVPLETH-PRED: 1.9 L
TLCPLETH-%PRED-PRE: 79 %
TLCPLETH-PRE: 3.47 L
TLCPLETH-PRED: 4.35 L
VA-%PRED-PRE: 70 %
VA-PRE: 2.85 L
VC-%PRED-PRE: 74 %
VC-PRE: 1.94 L
VC-PRED: 2.62 L

## 2023-02-18 ENCOUNTER — HEALTH MAINTENANCE LETTER (OUTPATIENT)
Age: 73
End: 2023-02-18

## 2023-02-23 ENCOUNTER — VIRTUAL VISIT (OUTPATIENT)
Dept: PULMONOLOGY | Facility: CLINIC | Age: 73
End: 2023-02-23
Attending: INTERNAL MEDICINE
Payer: COMMERCIAL

## 2023-02-23 DIAGNOSIS — J21.9 BRONCHIOLITIS: ICD-10-CM

## 2023-02-23 DIAGNOSIS — J84.9 ILD (INTERSTITIAL LUNG DISEASE) (H): Primary | ICD-10-CM

## 2023-02-23 PROCEDURE — 99215 OFFICE O/P EST HI 40 MIN: CPT | Mod: VID | Performed by: INTERNAL MEDICINE

## 2023-02-23 NOTE — PATIENT INSTRUCTIONS
Use Dulera 2 puffs twice daily and gargle after use  Continue exercise bike  Wear a face mask in public

## 2023-02-23 NOTE — PROGRESS NOTES
"Video-Visit Details    Type of service:  Video Visit    Video Start Time (time video started): 9:32am    Video End Time (time video stopped): 9:51am    Originating Location (pt. Location): Home        Distant Location (provider location):  Onsite    Mode of Communication:  Video Conference via [facility]    Reason for Visit  Saira Pyle is a 72 year old year old female who is being seen for Video Visit    HPI  Ms. 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  Both were seen high-resolution chest CT in 2017.  She is followed by Dr. Dewey in rheumatology.  She did not tolerate mycophenolate in the past due to GI side effects.  She had an infusion reaction to rituximab in the past (tightness and burning in her throat).    Today, Ms. Bhaskar Pyle reports that she has had a cold for about 1 week, but she is feeling better today.  Her symptoms include dry cough, sore throat, rhinitis and \"breathing felt bad.\"  She reports that she feels a little short of breath when she walks into the store, but that is stable.  She uses her exercise bike every day for at least half an hour, and she denies dyspnea on exertion when she is biking.  She has had lupus flares in her joints for the past 1.5 months, and prednisone has had to be increased.  She currently takes 5 mg a day, and the dose is slowly coming down.  She also takes leflunomide for her lupus.  For her bronchiolitis, she currently takes Dulera and montelukast.  However, she sometimes forgets to take the Dulera.  When she does use the Dulera, she only uses it once a day.  She denies paroxysmal nocturnal dyspnea.  She endorses cough that occasionally wakes her up at night which is associated with her acid reflux.  She is on a couple medications for her GERD, and medications help.    She did receive the Bivalent " "COVID-19 booster in October.          Current Outpatient Medications   Medication     acyclovir (ZOVIRAX) 400 MG tablet     amLODIPine (NORVASC) 5 MG tablet     Ascorbic Acid (VITAMIN C PO)     calcium carbonate-vitamin D 600-200 MG-UNIT TABS     estradiol (ESTRACE) 0.1 MG/GM vaginal cream     estradiol (VAGIFEM) 10 MCG TABS vaginal tablet     glucosamine-chondroitin 500-400 MG CAPS per capsule     hydroxypropyl methylcellulose (GENTEAL) 0.2 % SOLN ophthalmic solution     leflunomide (ARAVA) 10 MG tablet     melatonin 5 MG tablet     montelukast (SINGULAIR) 10 MG tablet     pimecrolimus (ELIDEL) 1 % cream     predniSONE (DELTASONE) 1 MG tablet     RABEprazole (ACIPHEX) 20 MG EC tablet     rosuvastatin (CRESTOR) 5 MG tablet     triamcinolone (KENALOG) 0.1 % external cream     Vitamin D, Cholecalciferol, 1000 units CAPS     zinc gluconate 50 MG tablet     BREO ELLIPTA 100-25 MCG/INH inhaler     gabapentin (NEURONTIN) 100 MG capsule     LACTOBACILLUS ACID-PECTIN PO     mometasone-formoterol (DULERA) 100-5 MCG/ACT inhaler     order for DME     oxyCODONE (ROXICODONE) 5 MG tablet     senna-docusate (SENOKOT-S/PERICOLACE) 8.6-50 MG tablet     Turmeric 500 MG TABS     No current facility-administered medications for this visit.     Allergies   Allergen Reactions     Acetaminophen Anaphylaxis     Influenza Vaccines Anaphylaxis     Rituximab Hives and Itching     Other reaction(s): Breathing Difficulty     Cephalexin Hives     Amitriptyline      PN: LW Reaction: agitation, irritability     Azithromycin Other (See Comments)     \"systemic organ failure\"     Cephalosporins Hives     Cimetidine      PN: LW Reaction: GI Upset, vomiting     Codeine      PN: LW Reaction: Vomiting     Diazepam      hyperactive     Dronabinol Other (See Comments)     Other reaction(s): Headache  Nausea and vomiting     Fluoxetine Other (See Comments)     extreme agitation, cannot be combined for use with cipro     Metronidazole      Generalized " illness     Metronidazole      Other reaction(s): Other - Describe In Comment Field  Extreme pain in legs     Miconazole      vaginal burning     Morphine Sulfate (Concentrate)      nausea and vomiting     Nitrofurantoin      Multiple organ failure     Nortriptyline Other (See Comments)     hyperactivity     Omeprazole      PN: LW Reaction: GI Upset     Oxycodone Other (See Comments)     Sensitive to taking oxycodone      Polymyxin B      Thimerosal Other (See Comments)     Severe buring, lupus flare     Tioconazole Other (See Comments)     Vaginal burning and bleeding     Erythromycin Rash     malaria  type reaction - fever and hair fell out     Iodine Rash     Needs to have Betadine washed off aftr surgery     Latex Rash     Other reaction(s): Other  redness     Levofloxacin Rash     PN: LW Reaction: Unknown Reaction     Quinolones Rash     happened with levaquin and Ciprofloxacin     Sulfa Drugs Rash     Tetracycline Rash     Past Medical History:   Diagnosis Date     Adverse effect of other specified systemic anti-infectives and antiparasitics, subsequent encounter      Anterior corneal dystrophy      Blepharitis      Bronchiolitis     small airways disease on chest CT 2017, probably related to SLE     Coronary artery disease      Cortical age-related cataract of both eyes      Disseminated retinitis and retinochoroiditis, pigment epitheliopathy      Diverticulitis of colon      GERD (gastroesophageal reflux disease)      Heart murmur     mitral valve disorder     Hypersomnia      ILD (interstitial lung disease) (H)     Mild ILD on chest CT 2017, probably related to SLE     Infection due to 2019 novel coronavirus     3/2022, did not require hospitalization; received monoclonal antibody     Keratopathy      MGD (meibomian gland dysfunction)      Osteopenia      Pericarditis     due to SLE, s/p pericardial window 2006     Plaquenil causing adverse effect in therapeutic use      Recurrent colitis due to Clostridium  difficile      Reflux esophagitis      Restrictive lung disease      SLE (systemic lupus erythematosus related syndrome) (H)     dx in 20s; arthritis, serositis (pericarditis and pleuritis)     SLE (systemic lupus erythematosus related syndrome) (H)      Tracheostomy in place (H)        Past Surgical History:   Procedure Laterality Date     ARTHROPLASTY KNEE Right 10/2/2018    Procedure: ARTHROPLASTY KNEE;  RIGHT TOTAL KNEE ARTHROPLASTY ;  Surgeon: Kelsie Hardin MD;  Location: [facility]     ARTHROPLASTY KNEE Left 10/1/2019    Procedure: EXAM UNDER ANESTHEISIA  AND LEFT TOTAL KNEE ARTHROPLASTY;  Surgeon: Kelsie Hardin MD;  Location: [facility] OR     CHOLECYSTECTOMY  04/2004     GYN SURGERY  04/2001    LEEP     GYN SURGERY  1985    removal of uterine fibroids     ORTHOPEDIC SURGERY Right     knee cartilage     ORTHOPEDIC SURGERY Left 2005    foot surgery     ORTHOPEDIC SURGERY Left     knee meniscus     ORTHOPEDIC SURGERY Left     thumb     ORTHOPEDIC SURGERY Right 2008    ulnar release     ORTHOPEDIC SURGERY Left 2017    ulnar release     THORACIC SURGERY      periocardiocentesis       Social History     Socioeconomic History     Marital status:      Spouse name: Not on file     Number of children: Not on file     Years of education: Not on file     Highest education level: Not on file   Occupational History     Not on file   Tobacco Use     Smoking status: Never     Smokeless tobacco: Never   Substance and Sexual Activity     Alcohol use: Not Currently     Drug use: Not Currently     Sexual activity: Not on file   Other Topics Concern     Parent/sibling w/ CABG, MI or angioplasty before 65F 55M? Not Asked   Social History Narrative    .  Had exposure to second hand tobacco smoke as a child.    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  No discharge or erythema, or obvious scleral/conjunctival abnormalities.\",\"RESP: No audible wheeze, cough, or visible cyanosis.  No visible retractions or increased work of breathing.  \",\"SKIN: Visible skin clear. No significant rash, abnormal pigmentation or lesions.\",\"NEURO: Cranial nerves grossly intact.  Mentation and speech appropriate for age.\",\"PSYCH: Mentation appears normal, affect normal/bright, judgement and insight intact, normal speech and appearance well-groomed.    Results:  I reviewed pulmonary function test that was performed on February 10.  This shows normal spirometry and lung volumes with a mild diffusion defect.  There has been a small drop in PFT compared to 6 months ago.    I reviewed results with the patient.      Assessment and plan:   Ms. 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    1.  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  She has had a recent viral URI and is better now.  There has been a small drop in her PFT, which could be attributed to the flares of her lupus that she has had in the past 1.5 months and also to not using the Dulera " consistently.  I recommended that she take Dulera 2 puffs twice a day and gargle with water after use.  She should continue montelukast daily at bedtime.  She will continue prednisone and leflunomide per Dr. Dewey for her lupus.  I encouraged her to continue to use her exercise bike daily and to wear a facemask in public as she is at increased risk due to her immunosuppression.  I will see her back in 3 months with full PFT.  If PFT worsens, then I would get a high-resolution chest CT at that time.  Symptomatically, she appears to be stable.  2.  Mild ILD associated with lupus.  PFT and chest CT have shown mild ILD to date.  If her PFT does not improve at her next visit, then I think a repeat high-resolution chest CT with expiratory images would be reasonable.  To date, because her ILD has been mild we have not needed to treat.  If her ILD worsens in the future, then we could think about Myfortic versus nintedanib depending on the pattern on the HRCT.    I spent 41 minutes reviewing chart, reviewing test results, talking with patient, formulating plan, and documentation on the day of the encounter.

## 2023-02-23 NOTE — NURSING NOTE
Is the patient currently in the state of [place]? YES    Visit mode:VIDEO    If the visit is dropped, the patient can be reconnected by: VIDEO VISIT: Text to cell phone: [phone]    Will anyone else be joining the visit? NO      How would you like to obtain your AVS? MyChart    Are changes needed to the allergy or medication list? NO    Reason for visit: scheduling wrong. Please add tymlos injection to medication list.    Mary Yen VF

## 2023-02-23 NOTE — LETTER
"    2/23/2023         RE: Saira Pyle  [address] [facility] [address]        Dear Colleague,    Thank you for referring your patient, Saira Pyle, to the [facility] [place]. Please see a copy of my visit note below.      [facility]    Reason for Visit  Saira Pyle is a 72 year old year old female who is being seen for Video Visit    HPI  Ms. 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  Both were seen high-resolution chest CT in 2017.  She is followed by Dr. Dewey in rheumatology.  She did not tolerate mycophenolate in the past due to GI side effects.  She had an infusion reaction to rituximab in the past (tightness and burning in her throat).    Today, Ms. Bhaskar Pyle reports that she has had a cold for about 1 week, but she is feeling better today.  Her symptoms include dry cough, sore throat, rhinitis and \"breathing felt bad.\"  She reports that she feels a little short of breath when she walks into the store, but that is stable.  She uses her exercise bike every day for at least half an hour, and she denies dyspnea on exertion when she is biking.  She has had lupus flares in her joints for the past 1.5 months, and prednisone has had to be increased.  She currently takes 5 mg a day, and the dose is slowly coming down.  She also takes leflunomide for her lupus.  For her bronchiolitis, she currently takes Dulera and montelukast.  However, she sometimes forgets to take the Dulera.  When she does use the Dulera, she only uses it once a day.  She denies paroxysmal nocturnal dyspnea.  She endorses cough that occasionally wakes her up at night which is associated with her acid reflux.  She is on a couple medications for her GERD, and medications help.    She did receive the Bivalent COVID-19 " "booster in October.          Current Outpatient Medications   Medication     acyclovir (ZOVIRAX) 400 MG tablet     amLODIPine (NORVASC) 5 MG tablet     Ascorbic Acid (VITAMIN C PO)     calcium carbonate-vitamin D 600-200 MG-UNIT TABS     estradiol (ESTRACE) 0.1 MG/GM vaginal cream     estradiol (VAGIFEM) 10 MCG TABS vaginal tablet     glucosamine-chondroitin 500-400 MG CAPS per capsule     hydroxypropyl methylcellulose (GENTEAL) 0.2 % SOLN ophthalmic solution     leflunomide (ARAVA) 10 MG tablet     melatonin 5 MG tablet     montelukast (SINGULAIR) 10 MG tablet     pimecrolimus (ELIDEL) 1 % cream     predniSONE (DELTASONE) 1 MG tablet     RABEprazole (ACIPHEX) 20 MG EC tablet     rosuvastatin (CRESTOR) 5 MG tablet     triamcinolone (KENALOG) 0.1 % external cream     Vitamin D, Cholecalciferol, 1000 units CAPS     zinc gluconate 50 MG tablet     BREO ELLIPTA 100-25 MCG/INH inhaler     gabapentin (NEURONTIN) 100 MG capsule     LACTOBACILLUS ACID-PECTIN PO     mometasone-formoterol (DULERA) 100-5 MCG/ACT inhaler     order for DME     oxyCODONE (ROXICODONE) 5 MG tablet     senna-docusate (SENOKOT-S/PERICOLACE) 8.6-50 MG tablet     Turmeric 500 MG TABS     No current facility-administered medications for this visit.     Allergies   Allergen Reactions     Acetaminophen Anaphylaxis     Influenza Vaccines Anaphylaxis     Rituximab Hives and Itching     Other reaction(s): Breathing Difficulty     Cephalexin Hives     Amitriptyline      PN: LW Reaction: agitation, irritability     Azithromycin Other (See Comments)     \"systemic organ failure\"     Cephalosporins Hives     Cimetidine      PN: LW Reaction: GI Upset, vomiting     Codeine      PN: LW Reaction: Vomiting     Diazepam      hyperactive     Dronabinol Other (See Comments)     Other reaction(s): Headache  Nausea and vomiting     Fluoxetine Other (See Comments)     extreme agitation, cannot be combined for use with cipro     Metronidazole      Generalized illness     " Metronidazole      Other reaction(s): Other - Describe In Comment Field  Extreme pain in legs     Miconazole      vaginal burning     Morphine Sulfate (Concentrate)      nausea and vomiting     Nitrofurantoin      Multiple organ failure     Nortriptyline Other (See Comments)     hyperactivity     Omeprazole      PN: LW Reaction: GI Upset     Oxycodone Other (See Comments)     Sensitive to taking oxycodone      Polymyxin B      Thimerosal Other (See Comments)     Severe buring, lupus flare     Tioconazole Other (See Comments)     Vaginal burning and bleeding     Erythromycin Rash     malaria  type reaction - fever and hair fell out     Iodine Rash     Needs to have Betadine washed off aftr surgery     Latex Rash     Other reaction(s): Other  redness     Levofloxacin Rash     PN: LW Reaction: Unknown Reaction     Quinolones Rash     happened with levaquin and Ciprofloxacin     Sulfa Drugs Rash     Tetracycline Rash     Past Medical History:   Diagnosis Date     Adverse effect of other specified systemic anti-infectives and antiparasitics, subsequent encounter      Anterior corneal dystrophy      Blepharitis      Bronchiolitis     small airways disease on chest CT 2017, probably related to SLE     Coronary artery disease      Cortical age-related cataract of both eyes      Disseminated retinitis and retinochoroiditis, pigment epitheliopathy      Diverticulitis of colon      GERD (gastroesophageal reflux disease)      Heart murmur     mitral valve disorder     Hypersomnia      ILD (interstitial lung disease) (H)     Mild ILD on chest CT 2017, probably related to SLE     Infection due to 2019 novel coronavirus     3/2022, did not require hospitalization; received monoclonal antibody     Keratopathy      MGD (meibomian gland dysfunction)      Osteopenia      Pericarditis     due to SLE, s/p pericardial window 2006     Plaquenil causing adverse effect in therapeutic use      Recurrent colitis due to Clostridium difficile       Reflux esophagitis      Restrictive lung disease      SLE (systemic lupus erythematosus related syndrome) (H)     dx in 20s; arthritis, serositis (pericarditis and pleuritis)     SLE (systemic lupus erythematosus related syndrome) (H)      Tracheostomy in place (H)        Past Surgical History:   Procedure Laterality Date     ARTHROPLASTY KNEE Right 10/2/2018    Procedure: ARTHROPLASTY KNEE;  RIGHT TOTAL KNEE ARTHROPLASTY ;  Surgeon: Kelsie Hardin MD;  Location: [facility]     ARTHROPLASTY KNEE Left 10/1/2019    Procedure: EXAM UNDER ANESTHEISIA  AND LEFT TOTAL KNEE ARTHROPLASTY;  Surgeon: Kelsie Hardin MD;  Location: [facility] OR     CHOLECYSTECTOMY  04/2004     GYN SURGERY  04/2001    LEEP     GYN SURGERY  1985    removal of uterine fibroids     ORTHOPEDIC SURGERY Right     knee cartilage     ORTHOPEDIC SURGERY Left 2005    foot surgery     ORTHOPEDIC SURGERY Left     knee meniscus     ORTHOPEDIC SURGERY Left     thumb     ORTHOPEDIC SURGERY Right 2008    ulnar release     ORTHOPEDIC SURGERY Left 2017    ulnar release     THORACIC SURGERY      periocardiocentesis       Social History     Socioeconomic History     Marital status:      Spouse name: Not on file     Number of children: Not on file     Years of education: Not on file     Highest education level: Not on file   Occupational History     Not on file   Tobacco Use     Smoking status: Never     Smokeless tobacco: Never   Substance and Sexual Activity     Alcohol use: Not Currently     Drug use: Not Currently     Sexual activity: Not on file   Other Topics Concern     Parent/sibling w/ CABG, MI or angioplasty before 65F 55M? Not Asked   Social History Narrative    .  Had exposure to second hand tobacco smoke as a child.    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  No discharge or erythema, or obvious scleral/conjunctival abnormalities.\",\"RESP: No audible wheeze, cough, or visible cyanosis.  No visible retractions or increased work of breathing.  \",\"SKIN: Visible skin clear. No significant rash, abnormal pigmentation or lesions.\",\"NEURO: Cranial nerves grossly intact.  Mentation and speech appropriate for age.\",\"PSYCH: Mentation appears normal, affect normal/bright, judgement and insight intact, normal speech and appearance well-groomed.    Results:  I reviewed pulmonary function test that was performed on February 10.  This shows normal spirometry and lung volumes with a mild diffusion defect.  There has been a small drop in PFT compared to 6 months ago.    I reviewed results with the patient.      Assessment and plan:   Ms. 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    1.  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  She has had a recent viral URI and is better now.  There has been a small drop in her PFT, which could be attributed to the flares of her lupus that she has had in the past 1.5 months and also to not using the Dulera consistently.  I recommended " that she take Dulera 2 puffs twice a day and gargle with water after use.  She should continue montelukast daily at bedtime.  She will continue prednisone and leflunomide per Dr. Dewey for her lupus.  I encouraged her to continue to use her exercise bike daily and to wear a facemask in public as she is at increased risk due to her immunosuppression.  I will see her back in 3 months with full PFT.  If PFT worsens, then I would get a high-resolution chest CT at that time.  Symptomatically, she appears to be stable.  2.  Mild ILD associated with lupus.  PFT and chest CT have shown mild ILD to date.  If her PFT does not improve at her next visit, then I think a repeat high-resolution chest CT with expiratory images would be reasonable.  To date, because her ILD has been mild we have not needed to treat.  If her ILD worsens in the future, then we could think about Myfortic versus nintedanib depending on the pattern on the HRCT.    I spent 41 minutes reviewing chart, reviewing test results, talking with patient, formulating plan, and documentation on the day of the encounter.    Again, thank you for allowing me to participate in the care of your patient.        Sincerely,        Petra Valentin MD

## 2023-02-27 ENCOUNTER — TELEPHONE (OUTPATIENT)
Dept: PULMONOLOGY | Facility: CLINIC | Age: 73
End: 2023-02-27
Payer: COMMERCIAL

## 2023-02-27 DIAGNOSIS — J84.9 ILD (INTERSTITIAL LUNG DISEASE) (H): ICD-10-CM

## 2023-02-27 DIAGNOSIS — J98.4 SMALL AIRWAYS DISEASE: ICD-10-CM

## 2023-02-27 DIAGNOSIS — J21.9 BRONCHIOLITIS: ICD-10-CM

## 2023-05-02 ENCOUNTER — TELEPHONE (OUTPATIENT)
Dept: PULMONOLOGY | Facility: CLINIC | Age: 73
End: 2023-05-02
Payer: MEDICARE

## 2023-05-02 NOTE — TELEPHONE ENCOUNTER
Pt called to discuss her recent visits to ED and primary care for treatment of fall, s/p concussion and respiratory symptoms. Was treated with amoxicillin for respiratory symptoms and had cxray.   Today she is reporting her cough is better, overall feels her respiratory symptoms are improving. She still feels sore and reported struggling with heartburn issues during time of abx treatment.   Informed patient to contact ILD Clinic if respiratory symptoms demonstrate worsening; pt verbalized understanding.     India Galeano, RN, BSN  ILD Nurse Care Coordinator  (P) [phone]

## 2023-05-06 DIAGNOSIS — J21.9 BRONCHIOLITIS: ICD-10-CM

## 2023-05-08 RX ORDER — MONTELUKAST SODIUM 10 MG/1
TABLET ORAL
Qty: 90 TABLET | Refills: 1 | Status: SHIPPED | OUTPATIENT
Start: 2023-05-08 | End: 2023-11-01

## 2023-06-20 ASSESSMENT — ENCOUNTER SYMPTOMS
STIFFNESS: 1
SPUTUM PRODUCTION: 0
DYSPNEA ON EXERTION: 0
PARALYSIS: 0
MEMORY LOSS: 0
SEIZURES: 0
BACK PAIN: 1
TINGLING: 1
POSTURAL DYSPNEA: 0
NECK PAIN: 1
SHORTNESS OF BREATH: 1
WEAKNESS: 1
SNORES LOUDLY: 0
MUSCLE WEAKNESS: 1
MUSCLE CRAMPS: 1
DISTURBANCES IN COORDINATION: 0
COUGH: 0
ARTHRALGIAS: 1
COUGH DISTURBING SLEEP: 1
SPEECH CHANGE: 0
JOINT SWELLING: 1
NUMBNESS: 0
TREMORS: 0
LOSS OF CONSCIOUSNESS: 0
WHEEZING: 0
HEADACHES: 0
MYALGIAS: 1
DIZZINESS: 1
HEMOPTYSIS: 0

## 2023-06-22 ENCOUNTER — TELEPHONE (OUTPATIENT)
Facility: CLINIC | Age: 73
End: 2023-06-22

## 2023-06-22 ENCOUNTER — OFFICE VISIT (OUTPATIENT)
Dept: PULMONOLOGY | Facility: CLINIC | Age: 73
End: 2023-06-22
Attending: INTERNAL MEDICINE
Payer: COMMERCIAL

## 2023-06-22 VITALS — SYSTOLIC BLOOD PRESSURE: 128 MMHG | HEART RATE: 93 BPM | OXYGEN SATURATION: 97 % | DIASTOLIC BLOOD PRESSURE: 75 MMHG

## 2023-06-22 DIAGNOSIS — J84.9 ILD (INTERSTITIAL LUNG DISEASE) (H): ICD-10-CM

## 2023-06-22 DIAGNOSIS — J21.9 BRONCHIOLITIS: ICD-10-CM

## 2023-06-22 DIAGNOSIS — I25.10 CORONARY ARTERY DISEASE INVOLVING NATIVE CORONARY ARTERY OF NATIVE HEART WITHOUT ANGINA PECTORIS: Primary | ICD-10-CM

## 2023-06-22 PROCEDURE — 94726 PLETHYSMOGRAPHY LUNG VOLUMES: CPT | Performed by: INTERNAL MEDICINE

## 2023-06-22 PROCEDURE — 94729 DIFFUSING CAPACITY: CPT | Performed by: INTERNAL MEDICINE

## 2023-06-22 PROCEDURE — 94375 RESPIRATORY FLOW VOLUME LOOP: CPT | Performed by: INTERNAL MEDICINE

## 2023-06-22 PROCEDURE — 99214 OFFICE O/P EST MOD 30 MIN: CPT | Mod: 25 | Performed by: INTERNAL MEDICINE

## 2023-06-22 PROCEDURE — G0463 HOSPITAL OUTPT CLINIC VISIT: HCPCS | Performed by: INTERNAL MEDICINE

## 2023-06-22 RX ORDER — PANTOPRAZOLE SODIUM 40 MG/1
40 TABLET, DELAYED RELEASE ORAL DAILY
COMMUNITY

## 2023-06-22 ASSESSMENT — PAIN SCALES - GENERAL: PAINLEVEL: NO PAIN (0)

## 2023-06-22 NOTE — NURSING NOTE
Chief Complaint   Patient presents with     Interstitial Lung Disease (ILD)     ILD Follow up      Vitals were taken and medications were reconciled.     Therese NAGELA  11:27 AM

## 2023-06-22 NOTE — LETTER
6/22/2023         RE: Saira Pyle  [address] [facility] [id number]        Dear Colleague,    Thank you for referring your patient, Saira Pyle, to the [facility] AND [facility] [place]. Please see a copy of my visit note below.    [facility] Interstitial Lung Disease [facility]    Reason for Visit  Saira Pyle is a 72 year old year old female who is being seen for Interstitial Lung Disease (ILD) (ILD Follow up )    HPI  Ms. 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  Both were seen on high-resolution chest CT in 2017.  She is followed by Dr. Dewey in rheumatology.  She did not tolerate mycophenolate in the past due to GI side effects.  She had an infusion reaction to rituximab in the past (tightness and burning in her throat).  Her bronchiolitis is being managed with Dulera and montelukast.  She also takes prednisone and leflunomide for her lupus.     Today, she reports that she has noticed more of a lung burning sensation with the bad air quality recently.  She endorses a dry cough at nighttime for about 5 days, and she also reports continued heartburn symptoms despite famotidine and another GERD medication that she does not remember.  She also endorses postnasal drip.  She denies dyspnea on exertion walking up a flight of stairs and denies new skin rashes.  She can walk uphill without experiencing dyspnea on exertion.    She had a bad viral URI in April; she also fell and sustained a concussion and developed pneumonia as well.  She is recovering from that.  She has had multiple chest x-rays and also CT scans of her spine and neck in the past few months.          Current Outpatient Medications   Medication    acyclovir (ZOVIRAX) 400 MG tablet    amLODIPine (NORVASC) 5 MG tablet    Ascorbic Acid (VITAMIN C PO)    calcium carbonate-vitamin D  "600-200 MG-UNIT TABS    estradiol (ESTRACE) 0.1 MG/GM vaginal cream    estradiol (VAGIFEM) 10 MCG TABS vaginal tablet    glucosamine-chondroitin 500-400 MG CAPS per capsule    hydroxypropyl methylcellulose (GENTEAL) 0.2 % SOLN ophthalmic solution    leflunomide (ARAVA) 10 MG tablet    melatonin 5 MG tablet    mometasone-formoterol (DULERA) 100-5 MCG/ACT inhaler    montelukast (SINGULAIR) 10 MG tablet    pantoprazole (PROTONIX) 40 MG EC tablet    pimecrolimus (ELIDEL) 1 % cream    predniSONE (DELTASONE) 1 MG tablet    rosuvastatin (CRESTOR) 5 MG tablet    triamcinolone (KENALOG) 0.1 % external cream    Vitamin D, Cholecalciferol, 1000 units CAPS    zinc gluconate 50 MG tablet    gabapentin (NEURONTIN) 100 MG capsule    LACTOBACILLUS ACID-PECTIN PO    order for DME    oxyCODONE (ROXICODONE) 5 MG tablet    senna-docusate (SENOKOT-S/PERICOLACE) 8.6-50 MG tablet    Turmeric 500 MG TABS     No current facility-administered medications for this visit.     Allergies   Allergen Reactions    Acetaminophen Anaphylaxis    Influenza Vaccines Anaphylaxis    Rituximab Hives and Itching     Other reaction(s): Breathing Difficulty    Cephalexin Hives    Amitriptyline      PN: LW Reaction: agitation, irritability    Azithromycin Other (See Comments)     \"systemic organ failure\"    Cephalosporins Hives    Cimetidine      PN: LW Reaction: GI Upset, vomiting    Codeine      PN: LW Reaction: Vomiting    Diazepam      hyperactive    Dronabinol Other (See Comments)     Other reaction(s): Headache  Nausea and vomiting    Fluoxetine Other (See Comments)     extreme agitation, cannot be combined for use with cipro    Metronidazole      Generalized illness    Metronidazole      Other reaction(s): Other - Describe In Comment Field  Extreme pain in legs    Miconazole      vaginal burning    Morphine Sulfate (Concentrate)      nausea and vomiting    Nitrofurantoin      Multiple organ failure    Nortriptyline Other (See Comments)     hyperactivity "    Omeprazole      PN: LW Reaction: GI Upset    Oxycodone Other (See Comments)     Sensitive to taking oxycodone     Polymyxin B     Thimerosal Other (See Comments)     Severe buring, lupus flare    Tioconazole Other (See Comments)     Vaginal burning and bleeding    Erythromycin Rash     malaria  type reaction - fever and hair fell out    Iodine Rash     Needs to have Betadine washed off aftr surgery    Latex Rash     Other reaction(s): Other  redness    Levofloxacin Rash     PN: LW Reaction: Unknown Reaction    Quinolones Rash     happened with levaquin and Ciprofloxacin    Sulfa Antibiotics Rash    Tetracycline Rash     Past Medical History:   Diagnosis Date    Adverse effect of other specified systemic anti-infectives and antiparasitics, subsequent encounter     Anterior corneal dystrophy     Blepharitis     Bronchiolitis     small airways disease on chest CT 2017, probably related to SLE    Coronary artery disease     Cortical age-related cataract of both eyes     Disseminated retinitis and retinochoroiditis, pigment epitheliopathy     Diverticulitis of colon     GERD (gastroesophageal reflux disease)     Heart murmur     mitral valve disorder    Hypersomnia     ILD (interstitial lung disease) (H)     Mild ILD on chest CT 2017, probably related to SLE    Infection due to 2019 novel coronavirus     3/2022, did not require hospitalization; received monoclonal antibody    Keratopathy     MGD (meibomian gland dysfunction)     Osteopenia     Pericarditis     due to SLE, s/p pericardial window 2006    Plaquenil causing adverse effect in therapeutic use     Recurrent colitis due to Clostridium difficile     Reflux esophagitis     Restrictive lung disease     SLE (systemic lupus erythematosus related syndrome) (H)     dx in 20s; arthritis, serositis (pericarditis and pleuritis)    SLE (systemic lupus erythematosus related syndrome) (H)     Tracheostomy in place (H)        Past Surgical History:   Procedure Laterality  Date    ARTHROPLASTY KNEE Right 10/2/2018    Procedure: ARTHROPLASTY KNEE;  RIGHT TOTAL KNEE ARTHROPLASTY ;  Surgeon: Kelsie Hardin MD;  Location: [facility] OR    ARTHROPLASTY KNEE Left 10/1/2019    Procedure: EXAM UNDER ANESTHEISIA  AND LEFT TOTAL KNEE ARTHROPLASTY;  Surgeon: Kelsie Hardin MD;  Location: [facility] OR    CHOLECYSTECTOMY  04/2004    GYN SURGERY  04/2001    LEEP    GYN SURGERY  1985    removal of uterine fibroids    ORTHOPEDIC SURGERY Right     knee cartilage    ORTHOPEDIC SURGERY Left 2005    foot surgery    ORTHOPEDIC SURGERY Left     knee meniscus    ORTHOPEDIC SURGERY Left     thumb    ORTHOPEDIC SURGERY Right 2008    ulnar release    ORTHOPEDIC SURGERY Left 2017    ulnar release    THORACIC SURGERY      periocardiocentesis       Social History     Socioeconomic History    Marital status:      Spouse name: Not on file    Number of children: Not on file    Years of education: Not on file    Highest education level: Not on file   Occupational History    Not on file   Tobacco Use    Smoking status: Never    Smokeless tobacco: Never   Substance and Sexual Activity    Alcohol use: Not Currently    Drug use: Not Currently    Sexual activity: Not on file   Other Topics Concern    Parent/sibling w/ CABG, MI or angioplasty before 65F 55M? Not Asked   Social History Narrative    .  Had exposure to second hand tobacco smoke as a child.    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     Social Determinants of Health     Financial Resource Strain: Not on file   Food Insecurity: Not on file    Transportation Needs: Not on file   Physical Activity: Not on file   Stress: Not on file   Social Connections: Not on file   Intimate Partner Violence: Not on file   Housing Stability: Not on file       Family History   Problem Relation Age of Onset    Sjogren's Mother     Sjogren's Sister            Vitals: /75 (BP Location: Right arm, Patient Position: Sitting, Cuff Size: Adult Regular)   Pulse 93   SpO2 97%     Exam:   GENERAL APPEARANCE: Well developed, well nourished, alert, and in no apparent distress.  RESP: good air flow throughout.  Bibasilar inspiratory crackles. No rhonchi. No wheezes.  CV: Normal S1, S2, regular rhythm, normal rate. No murmur.  No LE edema.   MS: extremities normal. No clubbing. No cyanosis.  SKIN: no rash on limited exam.  NEURO: Mentation intact, speech normal.  PSYCH: mentation appears normal. and affect normal/bright.    Results:  Recent Results (from the past 168 hour(s))   General PFT Lab (Please always keep checked)    Collection Time: 06/22/23 10:31 AM   Result Value Ref Range    FVC-Pred 2.31 L    FVC-Pre 1.90 L    FVC-%Pred-Pre 82 %    FEV1-Pre 1.56 L    FEV1-%Pred-Pre 85 %    FEV1FVC-Pred 79 %    FEV1FVC-Pre 82 %    FEFMax-Pred 5.12 L/sec    FEFMax-Pre 6.53 L/sec    FEFMax-%Pred-Pre 127 %    FEF2575-Pred 1.64 L/sec    FEF2575-Pre 1.80 L/sec    TFM2893-%Pred-Pre 109 %    ExpTime-Pre 6.52 sec    FIFMax-Pre 5.50 L/sec    VC-Pred 2.67 L    VC-Pre 1.85 L    VC-%Pred-Pre 69 %    IC-Pred 1.98 L    IC-Pre 1.40 L    IC-%Pred-Pre 70 %    ERV-Pred 0.62 L    ERV-Pre 0.45 L    ERV-%Pred-Pre 73 %    FEV1FEV6-Pred 79 %    FEV1FEV6-Pre 82 %    FRCPleth-Pred 2.40 L    FRCPleth-Pre 1.87 L    FRCPleth-%Pred-Pre 78 %    RVPleth-Pred 1.72 L    RVPleth-Pre 1.42 L    RVPleth-%Pred-Pre 82 %    TLCPleth-Pred 4.40 L    TLCPleth-Pre 3.27 L    TLCPleth-%Pred-Pre 74 %    DLCOunc-Pred 17.33 ml/min/mmHg    DLCOunc-Pre 11.86 ml/min/mmHg    DLCOunc-%Pred-Pre 68 %    VA-Pre 2.75 L    VA-%Pred-Pre 68  %    FEV1SVC-Pred 68 %    FEV1SVC-Pre 84 %       Reviewed pulmonary function test that was performed today.  This shows mild restriction with a mild diffusion defect.  FVC is stable, however DLCO has declined from 83% in April 22 to 68% today.  Hemoglobin on May 16 was normal.    I reviewed results with the patient.      Assessment and plan:  Ms. 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   1.  Mild ILD associated with lupus.  PFT today shows a decrease in DLCO compared to last year.  Symptomatically, she has not worsened other than she had a fall with a concussion 2 months ago.  Reasons for the decrease in DLCO could include anemia, however hemoglobin was normal a month ago; worsening ILD; or development of pulmonary hypertension.  We discussed getting a high-resolution chest CT, but she is reluctant to have more radiation exposure because she has had many x-rays and CT scans this year.  She agreed to get an echocardiogram to evaluate for pulmonary hypertension.  If this is normal, then I think the next step would be an HRCT.  She is in agreement with this plan.  If the ILD is worsening, then we could consider other options for treatment including nintedanib versus steroids versus rituximab versus Myfortic.    2.  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  I encouraged her to continue using the Dulera regularly.  She can also use it as needed.  She will also continue montelukast.  I would like to see her back in 6 months with full PFT.  She will continue prednisone and leflunomide per Dr. Dewey for her lupus.    I spent 58 minutes reviewing chart, reviewing test results, talking with and examining patient, formulating plan, and documentation on the day of the encounter.              Again, thank you for allowing me to participate in the care of your patient.        Sincerely,        Petra Valentin MD

## 2023-06-22 NOTE — TELEPHONE ENCOUNTER
Pharmacy requesting to refill Symbicort .  This medication is not on the patient's list.    Pharmacy:   Phone:   Fax:

## 2023-06-23 LAB
DLCOUNC-%PRED-PRE: 68 %
DLCOUNC-PRE: 11.86 ML/MIN/MMHG
DLCOUNC-PRED: 17.33 ML/MIN/MMHG
ERV-%PRED-PRE: 73 %
ERV-PRE: 0.45 L
ERV-PRED: 0.62 L
EXPTIME-PRE: 6.52 SEC
FEF2575-%PRED-PRE: 109 %
FEF2575-PRE: 1.8 L/SEC
FEF2575-PRED: 1.64 L/SEC
FEFMAX-%PRED-PRE: 127 %
FEFMAX-PRE: 6.53 L/SEC
FEFMAX-PRED: 5.12 L/SEC
FEV1-%PRED-PRE: 85 %
FEV1-PRE: 1.56 L
FEV1FEV6-PRE: 82 %
FEV1FEV6-PRED: 79 %
FEV1FVC-PRE: 82 %
FEV1FVC-PRED: 79 %
FEV1SVC-PRE: 84 %
FEV1SVC-PRED: 68 %
FIFMAX-PRE: 5.5 L/SEC
FRCPLETH-%PRED-PRE: 78 %
FRCPLETH-PRE: 1.87 L
FRCPLETH-PRED: 2.4 L
FVC-%PRED-PRE: 82 %
FVC-PRE: 1.9 L
FVC-PRED: 2.31 L
IC-%PRED-PRE: 70 %
IC-PRE: 1.4 L
IC-PRED: 1.98 L
RVPLETH-%PRED-PRE: 82 %
RVPLETH-PRE: 1.42 L
RVPLETH-PRED: 1.72 L
TLCPLETH-%PRED-PRE: 74 %
TLCPLETH-PRE: 3.27 L
TLCPLETH-PRED: 4.4 L
VA-%PRED-PRE: 68 %
VA-PRE: 2.75 L
VC-%PRED-PRE: 69 %
VC-PRE: 1.85 L
VC-PRED: 2.67 L

## 2023-06-23 NOTE — PROGRESS NOTES
[facility] Interstitial Lung Disease Clinic    Reason for Visit  Saira Pyle is a 72 year old year old female who is being seen for Interstitial Lung Disease (ILD) (ILD Follow up )    HPI  Ms. 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  Both were seen on high-resolution chest CT in 2017.  She is followed by Dr. Dewey in rheumatology.  She did not tolerate mycophenolate in the past due to GI side effects.  She had an infusion reaction to rituximab in the past (tightness and burning in her throat).  Her bronchiolitis is being managed with Dulera and montelukast.  She also takes prednisone and leflunomide for her lupus.     Today, she reports that she has noticed more of a lung burning sensation with the bad air quality recently.  She endorses a dry cough at nighttime for about 5 days, and she also reports continued heartburn symptoms despite famotidine and another GERD medication that she does not remember.  She also endorses postnasal drip.  She denies dyspnea on exertion walking up a flight of stairs and denies new skin rashes.  She can walk uphill without experiencing dyspnea on exertion.    She had a bad viral URI in April; she also fell and sustained a concussion and developed pneumonia as well.  She is recovering from that.  She has had multiple chest x-rays and also CT scans of her spine and neck in the past few months.          Current Outpatient Medications   Medication     acyclovir (ZOVIRAX) 400 MG tablet     amLODIPine (NORVASC) 5 MG tablet     Ascorbic Acid (VITAMIN C PO)     calcium carbonate-vitamin D 600-200 MG-UNIT TABS     estradiol (ESTRACE) 0.1 MG/GM vaginal cream     estradiol (VAGIFEM) 10 MCG TABS vaginal tablet     glucosamine-chondroitin 500-400 MG CAPS per capsule     hydroxypropyl methylcellulose (GENTEAL) 0.2 % SOLN ophthalmic solution     leflunomide (ARAVA) 10 MG tablet     melatonin 5 MG  "tablet     mometasone-formoterol (DULERA) 100-5 MCG/ACT inhaler     montelukast (SINGULAIR) 10 MG tablet     pantoprazole (PROTONIX) 40 MG EC tablet     pimecrolimus (ELIDEL) 1 % cream     predniSONE (DELTASONE) 1 MG tablet     rosuvastatin (CRESTOR) 5 MG tablet     triamcinolone (KENALOG) 0.1 % external cream     Vitamin D, Cholecalciferol, 1000 units CAPS     zinc gluconate 50 MG tablet     gabapentin (NEURONTIN) 100 MG capsule     LACTOBACILLUS ACID-PECTIN PO     order for DME     oxyCODONE (ROXICODONE) 5 MG tablet     senna-docusate (SENOKOT-S/PERICOLACE) 8.6-50 MG tablet     Turmeric 500 MG TABS     No current facility-administered medications for this visit.     Allergies   Allergen Reactions     Acetaminophen Anaphylaxis     Influenza Vaccines Anaphylaxis     Rituximab Hives and Itching     Other reaction(s): Breathing Difficulty     Cephalexin Hives     Amitriptyline      PN: LW Reaction: agitation, irritability     Azithromycin Other (See Comments)     \"systemic organ failure\"     Cephalosporins Hives     Cimetidine      PN: LW Reaction: GI Upset, vomiting     Codeine      PN: LW Reaction: Vomiting     Diazepam      hyperactive     Dronabinol Other (See Comments)     Other reaction(s): Headache  Nausea and vomiting     Fluoxetine Other (See Comments)     extreme agitation, cannot be combined for use with cipro     Metronidazole      Generalized illness     Metronidazole      Other reaction(s): Other - Describe In Comment Field  Extreme pain in legs     Miconazole      vaginal burning     Morphine Sulfate (Concentrate)      nausea and vomiting     Nitrofurantoin      Multiple organ failure     Nortriptyline Other (See Comments)     hyperactivity     Omeprazole      PN: LW Reaction: GI Upset     Oxycodone Other (See Comments)     Sensitive to taking oxycodone      Polymyxin B      Thimerosal Other (See Comments)     Severe buring, lupus flare     Tioconazole Other (See Comments)     Vaginal burning and " bleeding     Erythromycin Rash     malaria  type reaction - fever and hair fell out     Iodine Rash     Needs to have Betadine washed off aftr surgery     Latex Rash     Other reaction(s): Other  redness     Levofloxacin Rash     PN: LW Reaction: Unknown Reaction     Quinolones Rash     happened with levaquin and Ciprofloxacin     Sulfa Antibiotics Rash     Tetracycline Rash     Past Medical History:   Diagnosis Date     Adverse effect of other specified systemic anti-infectives and antiparasitics, subsequent encounter      Anterior corneal dystrophy      Blepharitis      Bronchiolitis     small airways disease on chest CT 2017, probably related to SLE     Coronary artery disease      Cortical age-related cataract of both eyes      Disseminated retinitis and retinochoroiditis, pigment epitheliopathy      Diverticulitis of colon      GERD (gastroesophageal reflux disease)      Heart murmur     mitral valve disorder     Hypersomnia      ILD (interstitial lung disease) (H)     Mild ILD on chest CT 2017, probably related to SLE     Infection due to 2019 novel coronavirus     3/2022, did not require hospitalization; received monoclonal antibody     Keratopathy      MGD (meibomian gland dysfunction)      Osteopenia      Pericarditis     due to SLE, s/p pericardial window 2006     Plaquenil causing adverse effect in therapeutic use      Recurrent colitis due to Clostridium difficile      Reflux esophagitis      Restrictive lung disease      SLE (systemic lupus erythematosus related syndrome) (H)     dx in 20s; arthritis, serositis (pericarditis and pleuritis)     SLE (systemic lupus erythematosus related syndrome) (H)      Tracheostomy in place (H)        Past Surgical History:   Procedure Laterality Date     ARTHROPLASTY KNEE Right 10/2/2018    Procedure: ARTHROPLASTY KNEE;  RIGHT TOTAL KNEE ARTHROPLASTY ;  Surgeon: Kelsie Hardin MD;  Location:  OR     ARTHROPLASTY KNEE Left 10/1/2019    Procedure: EXAM UNDER  ANESTHEISIA  AND LEFT TOTAL KNEE ARTHROPLASTY;  Surgeon: Kelsie Hardin MD;  Location: [facility] OR     CHOLECYSTECTOMY  04/2004     GYN SURGERY  04/2001    LEEP     GYN SURGERY  1985    removal of uterine fibroids     ORTHOPEDIC SURGERY Right     knee cartilage     ORTHOPEDIC SURGERY Left 2005    foot surgery     ORTHOPEDIC SURGERY Left     knee meniscus     ORTHOPEDIC SURGERY Left     thumb     ORTHOPEDIC SURGERY Right 2008    ulnar release     ORTHOPEDIC SURGERY Left 2017    ulnar release     THORACIC SURGERY      periocardiocentesis       Social History     Socioeconomic History     Marital status:      Spouse name: Not on file     Number of children: Not on file     Years of education: Not on file     Highest education level: Not on file   Occupational History     Not on file   Tobacco Use     Smoking status: Never     Smokeless tobacco: Never   Substance and Sexual Activity     Alcohol use: Not Currently     Drug use: Not Currently     Sexual activity: Not on file   Other Topics Concern     Parent/sibling w/ CABG, MI or angioplasty before 65F 55M? Not Asked   Social History Narrative    .  Had exposure to second hand tobacco smoke as a child.    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     Social Determinants of Health     Financial Resource Strain: Not on file   Food Insecurity: Not on file   Transportation Needs: Not on file   Physical Activity: Not on file   Stress: Not on file   Social Connections: Not on file   Intimate Partner Violence: Not on file   Housing Stability: Not on file        Family History   Problem Relation Age of Onset     Sjogren's Mother      Sjogren's Sister            Vitals: /75 (BP Location: Right arm, Patient Position: Sitting, Cuff Size: Adult Regular)   Pulse 93   SpO2 97%     Exam:   GENERAL APPEARANCE: Well developed, well nourished, alert, and in no apparent distress.  RESP: good air flow throughout.  Bibasilar inspiratory crackles. No rhonchi. No wheezes.  CV: Normal S1, S2, regular rhythm, normal rate. No murmur.  No LE edema.   MS: extremities normal. No clubbing. No cyanosis.  SKIN: no rash on limited exam.  NEURO: Mentation intact, speech normal.  PSYCH: mentation appears normal. and affect normal/bright.    Results:  Recent Results (from the past 168 hour(s))   General PFT Lab (Please always keep checked)    Collection Time: 06/22/23 10:31 AM   Result Value Ref Range    FVC-Pred 2.31 L    FVC-Pre 1.90 L    FVC-%Pred-Pre 82 %    FEV1-Pre 1.56 L    FEV1-%Pred-Pre 85 %    FEV1FVC-Pred 79 %    FEV1FVC-Pre 82 %    FEFMax-Pred 5.12 L/sec    FEFMax-Pre 6.53 L/sec    FEFMax-%Pred-Pre 127 %    FEF2575-Pred 1.64 L/sec    FEF2575-Pre 1.80 L/sec    RSB5480-%Pred-Pre 109 %    ExpTime-Pre 6.52 sec    FIFMax-Pre 5.50 L/sec    VC-Pred 2.67 L    VC-Pre 1.85 L    VC-%Pred-Pre 69 %    IC-Pred 1.98 L    IC-Pre 1.40 L    IC-%Pred-Pre 70 %    ERV-Pred 0.62 L    ERV-Pre 0.45 L    ERV-%Pred-Pre 73 %    FEV1FEV6-Pred 79 %    FEV1FEV6-Pre 82 %    FRCPleth-Pred 2.40 L    FRCPleth-Pre 1.87 L    FRCPleth-%Pred-Pre 78 %    RVPleth-Pred 1.72 L    RVPleth-Pre 1.42 L    RVPleth-%Pred-Pre 82 %    TLCPleth-Pred 4.40 L    TLCPleth-Pre 3.27 L    TLCPleth-%Pred-Pre 74 %    DLCOunc-Pred 17.33 ml/min/mmHg    DLCOunc-Pre 11.86 ml/min/mmHg    DLCOunc-%Pred-Pre 68 %    VA-Pre 2.75 L    VA-%Pred-Pre 68 %    FEV1SVC-Pred 68 %    FEV1SVC-Pre 84 %       Reviewed pulmonary function test that was performed today.  This shows mild restriction with a mild diffusion defect.  FVC is stable, however DLCO  has declined from 83% in April 22 to 68% today.  Hemoglobin on May 16 was normal.    I reviewed results with the patient.      Assessment and plan:  Ms. 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   1.  Mild ILD associated with lupus.  PFT today shows a decrease in DLCO compared to last year.  Symptomatically, she has not worsened other than she had a fall with a concussion 2 months ago.  Reasons for the decrease in DLCO could include anemia, however hemoglobin was normal a month ago; worsening ILD; or development of pulmonary hypertension.  We discussed getting a high-resolution chest CT, but she is reluctant to have more radiation exposure because she has had many x-rays and CT scans this year.  She agreed to get an echocardiogram to evaluate for pulmonary hypertension.  If this is normal, then I think the next step would be an HRCT.  She is in agreement with this plan.  If the ILD is worsening, then we could consider other options for treatment including nintedanib versus steroids versus rituximab versus Myfortic.    2.  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  I encouraged her to continue using the Dulera regularly.  She can also use it as needed.  She will also continue montelukast.  I would like to see her back in 6 months with full PFT.  She will continue prednisone and leflunomide per Dr. Dewey for her lupus.    I spent 58 minutes reviewing chart, talking with and examining patient, reviewing test results, formulating plan and documentation on the day of the encounter (excluding PFT review).

## 2023-06-26 NOTE — TELEPHONE ENCOUNTER
Detailed voicemail left for pharmacy staff at [facility] informing this medication no longer prescribed by Dr. Valentin; other inhalers are prescribed in place of this (Symbicort). For follow up questions by pharmacy, provided direct call back number to ILD RN line.     India Galeano, RN, BSN  ILD Nurse Care Coordinator  (P) [phone]

## 2023-08-04 DIAGNOSIS — J84.9 ILD (INTERSTITIAL LUNG DISEASE) (H): Primary | ICD-10-CM

## 2023-08-04 NOTE — PROGRESS NOTES
Spoke to pt re: 7/27/23 echo complete wo contrast results (from [facility] in care everywhere) reviewed by Dr. Valentin and are normal. Explained Dr. Valentin recommends pt get a chest HRCT scan. Okay if pt wants to wait 1-2 months. Pt expressed understanding and is agreeable to HRCT chest, prefers [facility]. Shree aware and will fax order.     Erica Bejarano, RN, BSN  ILD Nurse   [phone]

## 2023-08-15 ENCOUNTER — TELEPHONE (OUTPATIENT)
Dept: PULMONOLOGY | Facility: CLINIC | Age: 73
End: 2023-08-15
Payer: COMMERCIAL

## 2023-08-15 NOTE — CONFIDENTIAL NOTE
Prior Authorization Retail Medication Request    Medication/Dose: mometasone-formoterol (DULERA) 100-5 MCG/ACT inhaler   ICD code (if different than what is on RX):    Previously Tried and Failed:    Rationale:    Insurance Name:    Insurance ID:        Pharmacy Information (if different than what is on RX)  Name:    Phone:

## 2023-08-20 NOTE — TELEPHONE ENCOUNTER
PA Initiation    Medication: MOMETASONE FURO-FORMOTEROL -5 MCG/ACT IN AERO  Insurance Company: [employer] - Phone [phone] Fax [phone]  Pharmacy Filling the Rx: [id number] [place] [facility] - [address]  Filling Pharmacy Phone: [phone]  Filling Pharmacy Fax: [phone]  Start Date: 8/20/2023

## 2023-08-22 ENCOUNTER — TELEPHONE (OUTPATIENT)
Dept: PULMONOLOGY | Facility: CLINIC | Age: 73
End: 2023-08-22
Payer: COMMERCIAL

## 2023-08-22 NOTE — TELEPHONE ENCOUNTER
PRIOR AUTHORIZATION DENIED    Medication: MOMETASONE FURO-FORMOTEROL -5 MCG/ACT IN AERO    Insurance Company: [employer] - Phone [phone] Fax [phone]    Denial Date: 8/21/2023    Denial Rational: Patient needs to try and fail Flovent HFA or Flovent Diskus.     Appeal Information:

## 2023-08-22 NOTE — TELEPHONE ENCOUNTER
----- Message from Lala Saeed RN sent at 8/22/2023 11:46 AM CDT -----  Regarding: Jeanette Lambert,    Pt's insurance is denying Dulera inhaler. Denial Rational: Patient needs to try and fail Flovent HFA or Flovent Diskus.     Would you like to try either?    Thanks, Lala

## 2023-08-23 ENCOUNTER — TELEPHONE (OUTPATIENT)
Dept: PULMONOLOGY | Facility: CLINIC | Age: 73
End: 2023-08-23
Payer: COMMERCIAL

## 2023-08-23 DIAGNOSIS — J21.9 BRONCHIOLITIS: ICD-10-CM

## 2023-08-23 DIAGNOSIS — J98.4 SMALL AIRWAYS DISEASE: ICD-10-CM

## 2023-08-23 DIAGNOSIS — J84.9 ILD (INTERSTITIAL LUNG DISEASE) (H): Primary | ICD-10-CM

## 2023-08-23 RX ORDER — FLUTICASONE PROPIONATE 110 UG/1
1 AEROSOL, METERED RESPIRATORY (INHALATION) 2 TIMES DAILY
Qty: 12 G | Refills: 1 | Status: SHIPPED | OUTPATIENT
Start: 2023-08-23 | End: 2023-08-23

## 2023-08-23 RX ORDER — FLUTICASONE PROPIONATE 110 UG/1
1 AEROSOL, METERED RESPIRATORY (INHALATION) 2 TIMES DAILY
Qty: 12 G | Refills: 1 | Status: SHIPPED | OUTPATIENT
Start: 2023-08-23 | End: 2024-02-15

## 2023-08-23 NOTE — TELEPHONE ENCOUNTER
----- Message from Petra Valentin MD sent at 8/23/2023 12:00 PM CDT -----  Regarding: RE: Dulera  HFA is preferable.  Please warn her that she might notice sore throat or hoarse voice or thrush.        ----- Message -----  From: Erica Beajrano RN  Sent: 8/23/2023   9:25 AM CDT  To: Petra Valentin MD; #  Subject: RE: Dulera                                       Hi Dr. Valentin,     Let us know if you would like to rx the recommended inhalers by pt insurance Flovent HFA or Flovent Diskus since they denied Dulera inhaler. We can order that today.     Erica     ----- Message -----  From: Lala Saeed RN  Sent: 8/22/2023   3:50 PM CDT  To: Petra Valentin MD; #  Subject: RE: Dulera                                       She's not tried either. Do you have a preference for HFA vs Diskus?    Also, she is wondering what your interpretation is of her HRCT done on 8/17.    ThanksLala  ----- Message -----  From: Petra Valentin MD  Sent: 8/22/2023   3:37 PM CDT  To: Lala Saeed RN; #  Subject: RE: Dulera                                       I don't remember if she's tried it before.  Please ask her.          ----- Message -----  From: Lala Saeed RN  Sent: 8/22/2023  11:47 AM CDT  To: Petra Valentin MD; #  Subject: Dulera                                           Hi Petra,    Pt's insurance is denying Dulera inhaler. Denial Rational: Patient needs to try and fail Flovent HFA or Flovent Diskus.     Would you like to try either?    Lala Bennett

## 2023-09-08 ENCOUNTER — TELEPHONE (OUTPATIENT)
Dept: PULMONOLOGY | Facility: CLINIC | Age: 73
End: 2023-09-08
Payer: COMMERCIAL

## 2023-09-12 ENCOUNTER — TELEPHONE (OUTPATIENT)
Dept: PULMONOLOGY | Facility: CLINIC | Age: 73
End: 2023-09-12
Payer: COMMERCIAL

## 2023-09-12 NOTE — CONFIDENTIAL NOTE
Prior Authorization Retail Medication Request    Medication/Dose: mometasone-formoterol (DULERA) 100-5 MCG/ACT inhaler   ICD code (if different than what is on RX):    Previously Tried and Failed:  fluticasone (FLOVENT HFA) 110 MCG/ACT inhaler   Rationale:  Fluticasone caused irritation to throat and increased frequency of cough. Was not effective for patient.     Insurance Name:    Insurance ID:        Pharmacy Information (if different than what is on RX)  Name:    Phone:

## 2023-09-21 NOTE — TELEPHONE ENCOUNTER
Yes there is an address.  With this plan because they require patient to sign, if patient puts down other contact info for the provider, I am unable to get any information regarding status or decision on the appeal as they are unable to verify if I am the correct person due to HIPAA. If patient filled out and sent to the insurance plan and insurance plan is reviewing it, I cannot get the decision.  The patient should provide this form to the clinic and then it can be provided to the PA team to fill out the rest and send in with the letter.

## 2023-09-21 NOTE — TELEPHONE ENCOUNTER
Patient also needs to sign the appeal form.  This is contained in the denial letter that was mailed to the patient by insurance plan.  Otherwise I can provide a copy to the clinic for patient to sign.

## 2023-10-17 ENCOUNTER — TELEPHONE (OUTPATIENT)
Dept: PULMONOLOGY | Facility: CLINIC | Age: 73
End: 2023-10-17
Payer: COMMERCIAL

## 2023-10-17 DIAGNOSIS — J98.4 SMALL AIRWAYS DISEASE: Primary | ICD-10-CM

## 2023-10-17 RX ORDER — ALBUTEROL SULFATE 90 UG/1
2 AEROSOL, METERED RESPIRATORY (INHALATION) EVERY 4 HOURS PRN
Qty: 18 G | Refills: 1 | Status: SHIPPED | OUTPATIENT
Start: 2023-10-17

## 2023-10-26 ENCOUNTER — OFFICE VISIT (OUTPATIENT)
Dept: PULMONOLOGY | Facility: CLINIC | Age: 73
End: 2023-10-26
Attending: PHYSICIAN ASSISTANT
Payer: COMMERCIAL

## 2023-10-26 VITALS — HEART RATE: 80 BPM | SYSTOLIC BLOOD PRESSURE: 118 MMHG | DIASTOLIC BLOOD PRESSURE: 72 MMHG | OXYGEN SATURATION: 97 %

## 2023-10-26 DIAGNOSIS — J84.9 ILD (INTERSTITIAL LUNG DISEASE) (H): Primary | ICD-10-CM

## 2023-10-26 DIAGNOSIS — J84.9 ILD (INTERSTITIAL LUNG DISEASE) (H): ICD-10-CM

## 2023-10-26 PROCEDURE — 99214 OFFICE O/P EST MOD 30 MIN: CPT | Mod: 25 | Performed by: INTERNAL MEDICINE

## 2023-10-26 PROCEDURE — 99213 OFFICE O/P EST LOW 20 MIN: CPT | Performed by: INTERNAL MEDICINE

## 2023-10-26 PROCEDURE — 94375 RESPIRATORY FLOW VOLUME LOOP: CPT | Performed by: INTERNAL MEDICINE

## 2023-10-26 PROCEDURE — 94729 DIFFUSING CAPACITY: CPT | Performed by: INTERNAL MEDICINE

## 2023-10-26 PROCEDURE — 94726 PLETHYSMOGRAPHY LUNG VOLUMES: CPT | Performed by: INTERNAL MEDICINE

## 2023-10-26 RX ORDER — METHOCARBAMOL 500 MG/1
500 TABLET, FILM COATED ORAL
COMMUNITY
Start: 2023-02-12

## 2023-10-26 ASSESSMENT — PAIN SCALES - GENERAL: PAINLEVEL: NO PAIN (0)

## 2023-10-26 NOTE — NURSING NOTE
Chief Complaint   Patient presents with    Interstitial Lung Disease (ILD)     ILD follow up      Vitals were taken and medications were reconciled.     Therese Higgins RMA  11:41 AM

## 2023-10-26 NOTE — PATIENT INSTRUCTIONS
-- Will call you regarding you previous CT scan results to discuss changes to your treatment plan  -- Return in 4 months to see Dr. Valentin with plan to repeat lung testing

## 2023-10-26 NOTE — PROGRESS NOTES
[facility]    Reason for Visit  Saira Pyle is a 73 year old year old female who is being seen for Interstitial Lung Disease (ILD) (ILD follow up )  HPI  Ms. 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 She is followed by Dr. Dewey in rheumatology.  She did not tolerate mycophenolate in the past due to GI side effects.  She had an infusion reaction to rituximab in the past (tightness and burning in her throat).  Her bronchiolitis is being managed with albuterol (started on 10/24 per patient) and montelukast, previously on Dulera but insurance refused to continue coverage. She also takes prednisone and leflunomide for her lupus.     Today, she reports that she was having worsened night time cough after Dulera was discontinued that has since improved with starting albuterol.  She now endorses a dry cough intermittently during day that has overall improved with albuterol.  She denies dyspnea on exertion walking up a flight of stairs and denies new skin rashes.  She can walk up hill without experiencing dyspnea on exertion. No other acute complaints when seen today.         Current Outpatient Medications   Medication    acyclovir (ZOVIRAX) 400 MG tablet    albuterol (PROAIR HFA/PROVENTIL HFA/VENTOLIN HFA) 108 (90 Base) MCG/ACT inhaler    amLODIPine (NORVASC) 5 MG tablet    Ascorbic Acid (VITAMIN C PO)    calcium carbonate-vitamin D 600-200 MG-UNIT TABS    estradiol (ESTRACE) 0.1 MG/GM vaginal cream    estradiol (VAGIFEM) 10 MCG TABS vaginal tablet    fluticasone (FLOVENT HFA) 110 MCG/ACT inhaler    glucosamine-chondroitin 500-400 MG CAPS per capsule    hydroxypropyl methylcellulose (GENTEAL) 0.2 % SOLN ophthalmic solution    leflunomide (ARAVA) 10 MG tablet    melatonin 5 MG tablet    methocarbamol (ROBAXIN) 500 MG tablet    mometasone-formoterol (DULERA) 100-5 MCG/ACT inhaler    montelukast  "(SINGULAIR) 10 MG tablet    pantoprazole (PROTONIX) 40 MG EC tablet    pimecrolimus (ELIDEL) 1 % cream    predniSONE (DELTASONE) 1 MG tablet    rosuvastatin (CRESTOR) 5 MG tablet    triamcinolone (KENALOG) 0.1 % external cream    Vitamin D, Cholecalciferol, 1000 units CAPS    zinc gluconate 50 MG tablet    gabapentin (NEURONTIN) 100 MG capsule    LACTOBACILLUS ACID-PECTIN PO    Turmeric 500 MG TABS     No current facility-administered medications for this visit.     Allergies   Allergen Reactions    Acetaminophen Anaphylaxis    Influenza Vaccines Anaphylaxis    Rituximab Hives and Itching     Other reaction(s): Breathing Difficulty    Cephalexin Hives    Amitriptyline      PN: LW Reaction: agitation, irritability    Azithromycin Other (See Comments)     \"systemic organ failure\"    Cephalosporins Hives    Cimetidine      PN: LW Reaction: GI Upset, vomiting    Codeine      PN: LW Reaction: Vomiting    Diazepam      hyperactive    Dronabinol Other (See Comments)     Other reaction(s): Headache  Nausea and vomiting    Fluoxetine Other (See Comments)     extreme agitation, cannot be combined for use with cipro    Metronidazole      Generalized illness    Metronidazole      Other reaction(s): Other - Describe In Comment Field  Extreme pain in legs    Miconazole      vaginal burning    Morphine Sulfate (Concentrate)      nausea and vomiting    Nitrofurantoin      Multiple organ failure    Nortriptyline Other (See Comments)     hyperactivity    Omeprazole      PN: LW Reaction: GI Upset    Oxycodone Other (See Comments)     Sensitive to taking oxycodone     Polymyxin B     Thimerosal (Thiomersal) Other (See Comments)     Severe buring, lupus flare    Tioconazole Other (See Comments)     Vaginal burning and bleeding    Erythromycin Rash     malaria  type reaction - fever and hair fell out    Iodine Rash     Needs to have Betadine washed off aftr surgery    Latex Rash     Other reaction(s): Other  redness    Levofloxacin Rash "     PN: LW Reaction: Unknown Reaction    Quinolones Rash     happened with levaquin and Ciprofloxacin    Sulfa Antibiotics Rash    Tetracycline Rash     Past Medical History:   Diagnosis Date    Adverse effect of other specified systemic anti-infectives and antiparasitics, subsequent encounter     Anterior corneal dystrophy     Blepharitis     Bronchiolitis     small airways disease on chest CT 2017, probably related to SLE    Coronary artery disease     Cortical age-related cataract of both eyes     Disseminated retinitis and retinochoroiditis, pigment epitheliopathy     Diverticulitis of colon     GERD (gastroesophageal reflux disease)     Heart murmur     mitral valve disorder    Hypersomnia     ILD (interstitial lung disease) (H)     Mild ILD on chest CT 2017, probably related to SLE    Infection due to 2019 novel coronavirus     3/2022, did not require hospitalization; received monoclonal antibody    Keratopathy     MGD (meibomian gland dysfunction)     Osteopenia     Pericarditis     due to SLE, s/p pericardial window 2006    Plaquenil causing adverse effect in therapeutic use     Recurrent colitis due to Clostridium difficile     Reflux esophagitis     Restrictive lung disease     SLE (systemic lupus erythematosus related syndrome) (H)     dx in 20s; arthritis, serositis (pericarditis and pleuritis)    SLE (systemic lupus erythematosus related syndrome) (H)     Tracheostomy in place (H)        Past Surgical History:   Procedure Laterality Date    ARTHROPLASTY KNEE Right 10/2/2018    Procedure: ARTHROPLASTY KNEE;  RIGHT TOTAL KNEE ARTHROPLASTY ;  Surgeon: Kelsie Hardin MD;  Location:  OR    ARTHROPLASTY KNEE Left 10/1/2019    Procedure: EXAM UNDER ANESTHEISIA  AND LEFT TOTAL KNEE ARTHROPLASTY;  Surgeon: Kelsie Hardin MD;  Location:  OR    CHOLECYSTECTOMY  04/2004    GYN SURGERY  04/2001    LEEP    GYN SURGERY  1985    removal of uterine fibroids    ORTHOPEDIC SURGERY Right     knee cartilage     ORTHOPEDIC SURGERY Left 2005    foot surgery    ORTHOPEDIC SURGERY Left     knee meniscus    ORTHOPEDIC SURGERY Left     thumb    ORTHOPEDIC SURGERY Right 2008    ulnar release    ORTHOPEDIC SURGERY Left 2017    ulnar release    THORACIC SURGERY      periocardiocentesis       Social History     Socioeconomic History    Marital status:      Spouse name: Not on file    Number of children: Not on file    Years of education: Not on file    Highest education level: Not on file   Occupational History    Not on file   Tobacco Use    Smoking status: Never    Smokeless tobacco: Never   Substance and Sexual Activity    Alcohol use: Not Currently    Drug use: Not Currently    Sexual activity: Not on file   Other Topics Concern    Parent/sibling w/ CABG, MI or angioplasty before 65F 55M? Not Asked   Social History Narrative    .  Had exposure to second hand tobacco smoke as a child.    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  RESP: good air flow throughout.  Marlonasilar  inspiratory crackles. No rhonchi. No wheezes.  CV: Normal S1, S2, regular rhythm, normal rate. No murmur.  No LE edema.   MS: extremities normal. No clubbing. No cyanosis.  SKIN: no rash on limited exam.  NEURO: Mentation intact, speech normal.  PSYCH: mentation appears normal. and affect normal/bright.    Results:  Recent Results (from the past 168 hour(s))   General PFT Lab (Please always keep checked)    Collection Time: 10/26/23 10:41 AM   Result Value Ref Range    FVC-Pred 2.28 L    FVC-Pre 1.79 L    FVC-%Pred-Pre 78 %    FEV1-Pre 1.53 L    FEV1-%Pred-Pre 85 %    FEV1FVC-Pred 79 %    FEV1FVC-Pre 86 %    FEFMax-Pred 5.03 L/sec    FEFMax-Pre 6.69 L/sec    FEFMax-%Pred-Pre 133 %    FEF2575-Pred 1.60 L/sec    FEF2575-Pre 1.70 L/sec    KIL9617-%Pred-Pre 106 %    ExpTime-Pre 4.31 sec    FIFMax-Pre 5.63 L/sec    VC-Pred 2.71 L    VC-Pre 1.82 L    VC-%Pred-Pre 67 %    IC-Pred 1.67 L    IC-Pre 1.50 L    IC-%Pred-Pre 89 %    ERV-Pred 0.84 L    ERV-Pre 0.32 L    ERV-%Pred-Pre 38 %    FEV1FEV6-Pred 79 %    FEV1FEV6-Pre 83 %    FRCPleth-Pred 2.51 L    FRCPleth-Pre 1.75 L    FRCPleth-%Pred-Pre 69 %    RVPleth-Pred 1.92 L    RVPleth-Pre 1.42 L    RVPleth-%Pred-Pre 74 %    TLCPleth-Pred 4.35 L    TLCPleth-Pre 3.25 L    TLCPleth-%Pred-Pre 74 %    DLCOunc-Pred 17.25 ml/min/mmHg    DLCOunc-Pre 10.84 ml/min/mmHg    DLCOunc-%Pred-Pre 62 %    VA-Pre 2.71 L    VA-%Pred-Pre 67 %    FEV1SVC-Pred 66 %    FEV1SVC-Pre 84 %       Reviewed pulmonary function test that was performed today.  Patient continues to have consistent reduction in DLCO.    I reviewed results with the patient.    Assessment and plan:  Ms. 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   1.  Mild ILD associated with lupus.  PFT today shows a decrease in DLCO compared to previous testing.  Symptomatically, she has not significantly worsened.  Reasons for the decrease in DLCO could be due to worsening  ILD. We considered pulmonary hypertension though 7/2023 TTE showed normal right ventricle and atrial pressures, so pulmonary hypertension is unlikely.  She does have an 8/2023 outside chest CT scan that we will have pushed to our system. If 8/2023 CT scan reveals ILD is worsening, then we could consider other options for treatment including nintedanib versus steroids versus rituximab versus Myfortic.    2.  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  I encouraged her to continue using the albuterol regularly as well as continue montelukast.  I would like to see her back in 4 months with full PFT.  She will continue prednisone and leflunomide per Dr. Dewey for her lupus.    Patient seen and discussed with pulmonology staff. Staff addendum to follow.     Lyle Castellanos MD  Medicine PGY3      I saw and evaluated patient with Resident.  Case discussed - agree with note.  I reviewed PFT from today: mild restriction with moderate diffusion defect; FVC and DLCO are down compared to 4/2022.  I reviewed outside chest CT from Aug: not a high res chest CT.  Will get a HRCT now to evaluate the drop in PFT.    I spent 32 minutes reviewing chart, talking with and examining patient, reviewing test results, formulating plan and documentation on the day of the encounter (excluding PFT review).       KESHAWN KELLY M.D.

## 2023-10-26 NOTE — LETTER
10/26/2023         RE: Saira Pyle  [address] [facility] [id number]        Dear Colleague,    Thank you for referring your patient, Saira Pyle, to the [facility] FOR LUNG SCIENCE AND [facility] [place]. Please see a copy of my visit note below.    [facility] Disease [facility]    Reason for Visit  Saira Pyle is a 73 year old year old female who is being seen for Interstitial Lung Disease (ILD) (ILD follow up )  HPI  Ms. 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 She is followed by Dr. Dewey in rheumatology.  She did not tolerate mycophenolate in the past due to GI side effects.  She had an infusion reaction to rituximab in the past (tightness and burning in her throat).  Her bronchiolitis is being managed with albuterol (started on 10/24 per patient) and montelukast, previously on Dulera but insurance refused to continue coverage. She also takes prednisone and leflunomide for her lupus.     Today, she reports that she was having worsened night time cough after Dulera was discontinued that has since improved with starting albuterol.  She now endorses a dry cough intermittently during day that has overall improved with albuterol.  She denies dyspnea on exertion walking up a flight of stairs and denies new skin rashes.  She can walk up hill without experiencing dyspnea on exertion. No other acute complaints when seen today.         Current Outpatient Medications   Medication     acyclovir (ZOVIRAX) 400 MG tablet     albuterol (PROAIR HFA/PROVENTIL HFA/VENTOLIN HFA) 108 (90 Base) MCG/ACT inhaler     amLODIPine (NORVASC) 5 MG tablet     Ascorbic Acid (VITAMIN C PO)     calcium carbonate-vitamin D 600-200 MG-UNIT TABS     estradiol (ESTRACE) 0.1 MG/GM vaginal cream     estradiol (VAGIFEM) 10 MCG TABS vaginal tablet     fluticasone (FLOVENT HFA) 110  "MCG/ACT inhaler     glucosamine-chondroitin 500-400 MG CAPS per capsule     hydroxypropyl methylcellulose (GENTEAL) 0.2 % SOLN ophthalmic solution     leflunomide (ARAVA) 10 MG tablet     melatonin 5 MG tablet     methocarbamol (ROBAXIN) 500 MG tablet     mometasone-formoterol (DULERA) 100-5 MCG/ACT inhaler     montelukast (SINGULAIR) 10 MG tablet     pantoprazole (PROTONIX) 40 MG EC tablet     pimecrolimus (ELIDEL) 1 % cream     predniSONE (DELTASONE) 1 MG tablet     rosuvastatin (CRESTOR) 5 MG tablet     triamcinolone (KENALOG) 0.1 % external cream     Vitamin D, Cholecalciferol, 1000 units CAPS     zinc gluconate 50 MG tablet     gabapentin (NEURONTIN) 100 MG capsule     LACTOBACILLUS ACID-PECTIN PO     Turmeric 500 MG TABS     No current facility-administered medications for this visit.     Allergies   Allergen Reactions     Acetaminophen Anaphylaxis     Influenza Vaccines Anaphylaxis     Rituximab Hives and Itching     Other reaction(s): Breathing Difficulty     Cephalexin Hives     Amitriptyline      PN: LW Reaction: agitation, irritability     Azithromycin Other (See Comments)     \"systemic organ failure\"     Cephalosporins Hives     Cimetidine      PN: LW Reaction: GI Upset, vomiting     Codeine      PN: LW Reaction: Vomiting     Diazepam      hyperactive     Dronabinol Other (See Comments)     Other reaction(s): Headache  Nausea and vomiting     Fluoxetine Other (See Comments)     extreme agitation, cannot be combined for use with cipro     Metronidazole      Generalized illness     Metronidazole      Other reaction(s): Other - Describe In Comment Field  Extreme pain in legs     Miconazole      vaginal burning     Morphine Sulfate (Concentrate)      nausea and vomiting     Nitrofurantoin      Multiple organ failure     Nortriptyline Other (See Comments)     hyperactivity     Omeprazole      PN: LW Reaction: GI Upset     Oxycodone Other (See Comments)     Sensitive to taking oxycodone      Polymyxin B      " Thimerosal (Thiomersal) Other (See Comments)     Severe buring, lupus flare     Tioconazole Other (See Comments)     Vaginal burning and bleeding     Erythromycin Rash     malaria  type reaction - fever and hair fell out     Iodine Rash     Needs to have Betadine washed off aftr surgery     Latex Rash     Other reaction(s): Other  redness     Levofloxacin Rash     PN: LW Reaction: Unknown Reaction     Quinolones Rash     happened with levaquin and Ciprofloxacin     Sulfa Antibiotics Rash     Tetracycline Rash     Past Medical History:   Diagnosis Date     Adverse effect of other specified systemic anti-infectives and antiparasitics, subsequent encounter      Anterior corneal dystrophy      Blepharitis      Bronchiolitis     small airways disease on chest CT 2017, probably related to SLE     Coronary artery disease      Cortical age-related cataract of both eyes      Disseminated retinitis and retinochoroiditis, pigment epitheliopathy      Diverticulitis of colon      GERD (gastroesophageal reflux disease)      Heart murmur     mitral valve disorder     Hypersomnia      ILD (interstitial lung disease) (H)     Mild ILD on chest CT 2017, probably related to SLE     Infection due to 2019 novel coronavirus     3/2022, did not require hospitalization; received monoclonal antibody     Keratopathy      MGD (meibomian gland dysfunction)      Osteopenia      Pericarditis     due to SLE, s/p pericardial window 2006     Plaquenil causing adverse effect in therapeutic use      Recurrent colitis due to Clostridium difficile      Reflux esophagitis      Restrictive lung disease      SLE (systemic lupus erythematosus related syndrome) (H)     dx in 20s; arthritis, serositis (pericarditis and pleuritis)     SLE (systemic lupus erythematosus related syndrome) (H)      Tracheostomy in place (H)        Past Surgical History:   Procedure Laterality Date     ARTHROPLASTY KNEE Right 10/2/2018    Procedure: ARTHROPLASTY KNEE;  RIGHT TOTAL  KNEE ARTHROPLASTY ;  Surgeon: Kelsie Hardin MD;  Location: [facility] OR     ARTHROPLASTY KNEE Left 10/1/2019    Procedure: EXAM UNDER ANESTHEISIA  AND LEFT TOTAL KNEE ARTHROPLASTY;  Surgeon: Kelsie Hardin MD;  Location: [facility] OR     CHOLECYSTECTOMY  04/2004     GYN SURGERY  04/2001    LEEP     GYN SURGERY  1985    removal of uterine fibroids     ORTHOPEDIC SURGERY Right     knee cartilage     ORTHOPEDIC SURGERY Left 2005    foot surgery     ORTHOPEDIC SURGERY Left     knee meniscus     ORTHOPEDIC SURGERY Left     thumb     ORTHOPEDIC SURGERY Right 2008    ulnar release     ORTHOPEDIC SURGERY Left 2017    ulnar release     THORACIC SURGERY      periocardiocentesis       Social History     Socioeconomic History     Marital status:      Spouse name: Not on file     Number of children: Not on file     Years of education: Not on file     Highest education level: Not on file   Occupational History     Not on file   Tobacco Use     Smoking status: Never     Smokeless tobacco: Never   Substance and Sexual Activity     Alcohol use: Not Currently     Drug use: Not Currently     Sexual activity: Not on file   Other Topics Concern     Parent/sibling w/ CABG, MI or angioplasty before 65F 55M? Not Asked   Social History Narrative    .  Had exposure to second hand tobacco smoke as a child.    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  RESP: good air flow throughout.  Bibasilar inspiratory crackles. No rhonchi. No wheezes.  CV: Normal S1, S2, regular rhythm, normal rate. No murmur.  No LE edema.   MS: extremities normal. No clubbing. No cyanosis.  SKIN: no rash on limited exam.  NEURO: Mentation intact, speech normal.  PSYCH: mentation appears normal. and affect normal/bright.    Results:  Recent Results (from the past 168 hour(s))   General PFT Lab (Please always keep checked)    Collection Time: 10/26/23 10:41 AM   Result Value Ref Range    FVC-Pred 2.28 L    FVC-Pre 1.79 L    FVC-%Pred-Pre 78 %    FEV1-Pre 1.53 L    FEV1-%Pred-Pre 85 %    FEV1FVC-Pred 79 %    FEV1FVC-Pre 86 %    FEFMax-Pred 5.03 L/sec    FEFMax-Pre 6.69 L/sec    FEFMax-%Pred-Pre 133 %    FEF2575-Pred 1.60 L/sec    FEF2575-Pre 1.70 L/sec    OOR1989-%Pred-Pre 106 %    ExpTime-Pre 4.31 sec    FIFMax-Pre 5.63 L/sec    VC-Pred 2.71 L    VC-Pre 1.82 L    VC-%Pred-Pre 67 %    IC-Pred 1.67 L    IC-Pre 1.50 L    IC-%Pred-Pre 89 %    ERV-Pred 0.84 L    ERV-Pre 0.32 L    ERV-%Pred-Pre 38 %    FEV1FEV6-Pred 79 %    FEV1FEV6-Pre 83 %    FRCPleth-Pred 2.51 L    FRCPleth-Pre 1.75 L    FRCPleth-%Pred-Pre 69 %    RVPleth-Pred 1.92 L    RVPleth-Pre 1.42 L    RVPleth-%Pred-Pre 74 %    TLCPleth-Pred 4.35 L    TLCPleth-Pre 3.25 L    TLCPleth-%Pred-Pre 74 %    DLCOunc-Pred 17.25 ml/min/mmHg    DLCOunc-Pre 10.84 ml/min/mmHg    DLCOunc-%Pred-Pre 62 %    VA-Pre 2.71 L    VA-%Pred-Pre 67 %    FEV1SVC-Pred 66 %    FEV1SVC-Pre 84 %       Reviewed pulmonary function test that was performed today.  Patient continues to have consistent  reduction in DLCO.    I reviewed results with the patient.    Assessment and plan:  Ms. 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   1.  Mild ILD associated with lupus.  PFT today shows a decrease in DLCO compared to previous testing.  Symptomatically, she has not significantly worsened.  Reasons for the decrease in DLCO could be due to worsening ILD. We considered pulmonary hypertension though 7/2023 TTE showed normal right ventricle and atrial pressures, so pulmonary hypertension is unlikely.  She does have an 8/2023 outside chest CT scan that we will have pushed to our system. If 8/2023 CT scan reveals ILD is worsening, then we could consider other options for treatment including nintedanib versus steroids versus rituximab versus Myfortic.    2.  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  I encouraged her to continue using the albuterol regularly as well as continue montelukast.  I would like to see her back in 4 months with full PFT.  She will continue prednisone and leflunomide per Dr. Dewey for her lupus.    Patient seen and discussed with pulmonology staff. Staff addendum to follow.     Lyle Castellanos MD  Medicine PGY3      I saw and evaluated patient with Resident.  Case discussed - agree with note.  I reviewed PFT from today: mild restriction with moderate diffusion defect; FVC and DLCO are down compared to 4/2022.  I reviewed outside chest CT from Aug: not a high res chest CT.  Will get a HRCT now to evaluate the drop in PFT.    I spent 32 minutes reviewing chart, talking with and examining patient, reviewing test results, formulating plan and documentation on the day of the encounter (excluding PFT review).       KESHAWN KELLY M.D.

## 2023-10-27 LAB
DLCOUNC-%PRED-PRE: 62 %
DLCOUNC-PRE: 10.84 ML/MIN/MMHG
DLCOUNC-PRED: 17.25 ML/MIN/MMHG
ERV-%PRED-PRE: 38 %
ERV-PRE: 0.32 L
ERV-PRED: 0.84 L
EXPTIME-PRE: 4.31 SEC
FEF2575-%PRED-PRE: 106 %
FEF2575-PRE: 1.7 L/SEC
FEF2575-PRED: 1.6 L/SEC
FEFMAX-%PRED-PRE: 133 %
FEFMAX-PRE: 6.69 L/SEC
FEFMAX-PRED: 5.03 L/SEC
FEV1-%PRED-PRE: 85 %
FEV1-PRE: 1.53 L
FEV1FEV6-PRE: 83 %
FEV1FEV6-PRED: 79 %
FEV1FVC-PRE: 86 %
FEV1FVC-PRED: 79 %
FEV1SVC-PRE: 84 %
FEV1SVC-PRED: 66 %
FIFMAX-PRE: 5.63 L/SEC
FRCPLETH-%PRED-PRE: 69 %
FRCPLETH-PRE: 1.75 L
FRCPLETH-PRED: 2.51 L
FVC-%PRED-PRE: 78 %
FVC-PRE: 1.79 L
FVC-PRED: 2.28 L
IC-%PRED-PRE: 89 %
IC-PRE: 1.5 L
IC-PRED: 1.67 L
RVPLETH-%PRED-PRE: 74 %
RVPLETH-PRE: 1.42 L
RVPLETH-PRED: 1.92 L
TLCPLETH-%PRED-PRE: 74 %
TLCPLETH-PRE: 3.25 L
TLCPLETH-PRED: 4.35 L
VA-%PRED-PRE: 67 %
VA-PRE: 2.71 L
VC-%PRED-PRE: 67 %
VC-PRE: 1.82 L
VC-PRED: 2.71 L

## 2023-11-01 DIAGNOSIS — J21.9 BRONCHIOLITIS: ICD-10-CM

## 2023-11-01 RX ORDER — MONTELUKAST SODIUM 10 MG/1
1 TABLET ORAL AT BEDTIME
Qty: 90 TABLET | Refills: 1 | Status: SHIPPED | OUTPATIENT
Start: 2023-11-01 | End: 2024-04-22

## 2023-11-13 ENCOUNTER — TELEPHONE (OUTPATIENT)
Dept: PULMONOLOGY | Facility: CLINIC | Age: 73
End: 2023-11-13
Payer: COMMERCIAL

## 2023-11-13 DIAGNOSIS — J98.4 SMALL AIRWAYS DISEASE: Primary | ICD-10-CM

## 2023-11-13 DIAGNOSIS — J84.9 ILD (INTERSTITIAL LUNG DISEASE) (H): ICD-10-CM

## 2023-11-13 DIAGNOSIS — J21.9 BRONCHIOLITIS: ICD-10-CM

## 2023-11-13 NOTE — TELEPHONE ENCOUNTER
Patient called and left voicemail at 0721 on 11/13 regarding her albuterol  Patient reports her albuterol isn't covering her respiratory symptoms, continues frequent cough and SOB  Pt still has an old Symbicort inhaler that she will use BID in the meantime   Dulera not covered by insurance     Nurse will send message to Dr. Valentin to advise on alternatives

## 2023-11-14 NOTE — CONFIDENTIAL NOTE
Prior Authorization Retail Medication Request    Medication/Dose: Dulera.  Diagnosis and ICD code (if different than what is on RX):    New/renewal/insurance change PA/secondary ins. PA:  Previously Tried and Failed:  Breo, Flovent and Albuterol  Rationale:  ***    Insurance   Primary:   Insurance ID:      Secondary (if applicable):  Insurance ID:      Pharmacy Information (if different than what is on RX)  Name:    Phone:    Fax:

## 2023-11-15 NOTE — TELEPHONE ENCOUNTER
----- Message from Petra Valentin MD sent at 11/15/2023 10:12 AM CST -----  Regarding: RE: albuterol alternative needed  OK, let's try spiriva.          ----- Message -----  From: Lala Saeed RN  Sent: 11/14/2023  11:32 AM CST  To: Petra Valentin MD; #  Subject: RE: albuterol alternative needed                 Hi Dr. Valentin,    Her insurance company states she needs to try and fail 3 inhalers before approving Dulera. She's tried and fail Flovent and Albuterol. She does not know of a 3rd that she's tried and didn't work for her.     Can she try Spiriva Respimat? If that fails then we'll have the 3 and can resubmit for Dulera.    Lala Bennett   ----- Message -----  From: Petra Valentin MD  Sent: 11/13/2023   3:59 PM CST  To: Lenore Wade RN; #  Subject: RE: albuterol alternative needed                 Well, she has failed flovent and albuterol.  Doesn't that meet the criteria for ICS/LABA inhaler?  I think some of the other ILD nurses know the answer to this.          ----- Message -----  From: Lenore Wade, MARCY  Sent: 11/13/2023  11:21 AM CST  To: Petra Valentin MD  Subject: albuterol alternative needed                     Good Morning,     Patient called and left a voicemail this morning regarding her albuterol inhaler. Patient reports using just the albuterol she still has frequent cough and SOB. Patient has been using an old Symbicort inhaler she has.   Dulera was denied by insurance, patient failed the initial alternative of Flovent.     Please advise on alternative therapy option    Thank you  Lenore Wade RN

## 2023-11-16 NOTE — TELEPHONE ENCOUNTER
Central Prior Authorization Team   Phone: [phone]    PA Initiation    Medication: DULERA 100-5 MCG/ACT IN AERO  Insurance Company: Other (see comments)Comment:  [facility]  Pharmacy Filling the Rx: [id number] - [place], [place] - [address]  Filling Pharmacy Phone: [phone]  Filling Pharmacy Fax:    Start Date: 11/16/2023

## 2023-11-16 NOTE — TELEPHONE ENCOUNTER
PRIOR AUTHORIZATION DENIED    Medication: DULERA 100-5 MCG/ACT IN AERO  Insurance Company: Other (see comments)Comment:  [employer]  Denial Date: 11/16/2023  Denial Rational: Insurance must have already had a denial on file              Appeal Information: N/A              Patient Notified: No

## 2023-12-18 ENCOUNTER — TELEPHONE (OUTPATIENT)
Dept: PULMONOLOGY | Facility: CLINIC | Age: 73
End: 2023-12-18
Payer: COMMERCIAL

## 2023-12-18 DIAGNOSIS — J21.9 BRONCHIOLITIS: Primary | ICD-10-CM

## 2023-12-18 DIAGNOSIS — J98.4 SMALL AIRWAYS DISEASE: ICD-10-CM

## 2023-12-18 DIAGNOSIS — J84.9 ILD (INTERSTITIAL LUNG DISEASE) (H): ICD-10-CM

## 2023-12-18 RX ORDER — FLUTICASONE FUROATE AND VILANTEROL 100; 25 UG/1; UG/1
1 POWDER RESPIRATORY (INHALATION) DAILY
Qty: 1 EACH | Refills: 3 | Status: SHIPPED | OUTPATIENT
Start: 2023-12-18 | End: 2024-05-09

## 2023-12-18 NOTE — TELEPHONE ENCOUNTER
Pt called into clinic requesting new inhaler. Pt has been taking Spirivia and does not think it is helping. Pt is not able to go on walks without having to stop to catch breath, walking from parking lot to store now increase sob. Pt would like to go back to [facility] since insurance will not cover dulera. Message sent to Dr. Valentin to advise.   Maris Andrews RN BSN

## 2023-12-20 ENCOUNTER — NURSE TRIAGE (OUTPATIENT)
Dept: NURSING | Facility: CLINIC | Age: 73
End: 2023-12-20
Payer: COMMERCIAL

## 2023-12-20 NOTE — TELEPHONE ENCOUNTER
Caller: Patient    Tested positive for COVID today.   Per chart review, had a [employer] message with pulmonary clinic.  Advised to call to triage line or call to PCP clinic.  Explained the process through [facility] with not having a PCP in our system.  Offered to triage and patient declined.      She hasn't contacted her PCP clinic yet, but is going to now.  If she doesn't get an answer from then, then she will call back for triage and possibly a virtual appointment.     Nelia Arias RN on 12/20/2023 at 3:41 PM      Reason for Disposition   Follow-up information-only call to recent contact, no triage required    Protocols used: Information Only Call - No Triage-A-[place]

## 2024-02-15 ENCOUNTER — OFFICE VISIT (OUTPATIENT)
Dept: PULMONOLOGY | Facility: CLINIC | Age: 74
End: 2024-02-15
Attending: INTERNAL MEDICINE
Payer: COMMERCIAL

## 2024-02-15 VITALS
SYSTOLIC BLOOD PRESSURE: 122 MMHG | WEIGHT: 116 LBS | DIASTOLIC BLOOD PRESSURE: 71 MMHG | OXYGEN SATURATION: 94 % | HEART RATE: 88 BPM | BODY MASS INDEX: 21.9 KG/M2 | HEIGHT: 61 IN

## 2024-02-15 DIAGNOSIS — J84.9 ILD (INTERSTITIAL LUNG DISEASE) (H): ICD-10-CM

## 2024-02-15 DIAGNOSIS — J21.9 BRONCHIOLITIS: Primary | ICD-10-CM

## 2024-02-15 PROCEDURE — 94726 PLETHYSMOGRAPHY LUNG VOLUMES: CPT | Performed by: INTERNAL MEDICINE

## 2024-02-15 PROCEDURE — 99214 OFFICE O/P EST MOD 30 MIN: CPT | Mod: 25 | Performed by: INTERNAL MEDICINE

## 2024-02-15 PROCEDURE — 94729 DIFFUSING CAPACITY: CPT | Performed by: INTERNAL MEDICINE

## 2024-02-15 PROCEDURE — 99213 OFFICE O/P EST LOW 20 MIN: CPT | Performed by: INTERNAL MEDICINE

## 2024-02-15 PROCEDURE — 94375 RESPIRATORY FLOW VOLUME LOOP: CPT | Performed by: INTERNAL MEDICINE

## 2024-02-15 RX ORDER — PREGABALIN 75 MG/1
75 CAPSULE ORAL AT BEDTIME
COMMUNITY
Start: 2023-10-02 | End: 2024-10-01

## 2024-02-15 ASSESSMENT — PAIN SCALES - GENERAL: PAINLEVEL: NO PAIN (0)

## 2024-02-15 NOTE — PROGRESS NOTES
[facility]    Reason for Visit  Saira Pyle is a 73 year old year old female who is being seen for Interstitial Lung Disease (ILD) (ILD follow up )  HPI  Ms. 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   She is followed by Dr. Dewey in rheumatology.  She did not tolerate mycophenolate in the past due to GI side effects.  She had an infusion reaction to rituximab in the past (tightness and burning in her throat).  Her bronchiolitis is being managed with albuterol (started on 10/24/2023 per patient) and montelukast, previously on Dulera but insurance refused to continue coverage.  She is using Breo Ellipta inhaler instead.  She also takes prednisone and leflunomide for her lupus.     She had COVID-19 during [place] time and took Paxlovid. Symptoms included fatigue and lack of appetite. Now back to baseline     Last was seen in ILD clinic on 10/26/2023. She has been tolerating the Breo Ellipta well, and she feels that she has been doing well on this inhaler. Breathing is better with activity. She can walk farther distances such as going from a parking lot to a store. Occasionally will have a dry cough at night.    No new headaches, vision changes, fevers, chills. Occasionally will have chest pain that she attributes to GERD. Gets nausea when she has a headache. No diarrhea, hematochezia, melena. No dysuria. No recent falls at home. Weight up 4 lbs since last visit in ILD clinic. Appetite has been good. Has not been doing specific cardio exercises. Has Raynaud occasionally.     Current medications include:   - Breo Ellipta daily  - montelukast 10mg daily  - albuterol inhaler q4h prn (not using)  - leflunomide 10mg daily  - prednisone 3mg daily    Social:   - she is planning to go to [place] this coming fall for a her 10-year wedding anniversary       Current Outpatient Medications  "  Medication    acyclovir (ZOVIRAX) 400 MG tablet    amLODIPine (NORVASC) 5 MG tablet    Ascorbic Acid (VITAMIN C PO)    calcium carbonate-vitamin D 600-200 MG-UNIT TABS    estradiol (ESTRACE) 0.1 MG/GM vaginal cream    estradiol (VAGIFEM) 10 MCG TABS vaginal tablet    fluticasone-vilanterol (BREO ELLIPTA) 100-25 MCG/ACT inhaler    glucosamine-chondroitin 500-400 MG CAPS per capsule    hydroxypropyl methylcellulose (GENTEAL) 0.2 % SOLN ophthalmic solution    leflunomide (ARAVA) 10 MG tablet    melatonin 5 MG tablet    methocarbamol (ROBAXIN) 500 MG tablet    montelukast (SINGULAIR) 10 MG tablet    pantoprazole (PROTONIX) 40 MG EC tablet    pimecrolimus (ELIDEL) 1 % cream    predniSONE (DELTASONE) 1 MG tablet    pregabalin (LYRICA) 75 MG capsule    rosuvastatin (CRESTOR) 5 MG tablet    triamcinolone (KENALOG) 0.1 % external cream    Vitamin D, Cholecalciferol, 1000 units CAPS    zinc gluconate 50 MG tablet    albuterol (PROAIR HFA/PROVENTIL HFA/VENTOLIN HFA) 108 (90 Base) MCG/ACT inhaler    fluticasone (FLOVENT HFA) 110 MCG/ACT inhaler    gabapentin (NEURONTIN) 100 MG capsule    LACTOBACILLUS ACID-PECTIN PO    tiotropium (SPIRIVA RESPIMAT) 2.5 MCG/ACT inhaler    Turmeric 500 MG TABS     No current facility-administered medications for this visit.     Allergies   Allergen Reactions    Acetaminophen Anaphylaxis    Influenza Vaccines Anaphylaxis    Rituximab Hives and Itching     Other reaction(s): Breathing Difficulty    Cephalexin Hives    Amitriptyline      PN: LW Reaction: agitation, irritability    Azithromycin Other (See Comments)     \"systemic organ failure\"    Cephalosporins Hives    Cimetidine      PN: LW Reaction: GI Upset, vomiting    Codeine      PN: LW Reaction: Vomiting    Diazepam      hyperactive    Dronabinol Other (See Comments)     Other reaction(s): Headache  Nausea and vomiting    Fluoxetine Other (See Comments)     extreme agitation, cannot be combined for use with cipro    Metronidazole      " Generalized illness    Metronidazole      Other reaction(s): Other - Describe In Comment Field  Extreme pain in legs    Miconazole      vaginal burning    Morphine Sulfate (Concentrate)      nausea and vomiting    Nitrofurantoin      Multiple organ failure    Nortriptyline Other (See Comments)     hyperactivity    Omeprazole      PN: LW Reaction: GI Upset    Oxycodone Other (See Comments)     Sensitive to taking oxycodone     Polymyxin B     Thimerosal (Thiomersal) Other (See Comments)     Severe buring, lupus flare    Tioconazole Other (See Comments)     Vaginal burning and bleeding    Erythromycin Rash     malaria  type reaction - fever and hair fell out    Iodine Rash     Needs to have Betadine washed off aftr surgery    Latex Rash     Other reaction(s): Other  redness    Levofloxacin Rash     PN: LW Reaction: Unknown Reaction    Quinolones Rash     happened with levaquin and Ciprofloxacin    Sulfa Antibiotics Rash    Tetracycline Rash     Past Medical History:   Diagnosis Date    Adverse effect of other specified systemic anti-infectives and antiparasitics, subsequent encounter     Anterior corneal dystrophy     Blepharitis     Bronchiolitis     small airways disease on chest CT 2017, probably related to SLE    Coronary artery disease     Cortical age-related cataract of both eyes     Disseminated retinitis and retinochoroiditis, pigment epitheliopathy     Diverticulitis of colon     GERD (gastroesophageal reflux disease)     Heart murmur     mitral valve disorder    Hypersomnia     ILD (interstitial lung disease) (H)     Mild ILD on chest CT 2017, probably related to SLE    Infection due to 2019 novel coronavirus     3/2022, did not require hospitalization; received monoclonal antibody    Keratopathy     MGD (meibomian gland dysfunction)     Osteopenia     Pericarditis     due to SLE, s/p pericardial window 2006    Plaquenil causing adverse effect in therapeutic use     Recurrent colitis due to Clostridium  difficile     Reflux esophagitis     Restrictive lung disease     SLE (systemic lupus erythematosus related syndrome) (H)     dx in 20s; arthritis, serositis (pericarditis and pleuritis)    SLE (systemic lupus erythematosus related syndrome) (H)     Tracheostomy in place (H)        Past Surgical History:   Procedure Laterality Date    ARTHROPLASTY KNEE Right 10/2/2018    Procedure: ARTHROPLASTY KNEE;  RIGHT TOTAL KNEE ARTHROPLASTY ;  Surgeon: Kelsie Hardin MD;  Location: [facility]    ARTHROPLASTY KNEE Left 10/1/2019    Procedure: EXAM UNDER ANESTHEISIA  AND LEFT TOTAL KNEE ARTHROPLASTY;  Surgeon: Kelsie Hardin MD;  Location: [facility] OR    CHOLECYSTECTOMY  04/2004    GYN SURGERY  04/2001    LEEP    GYN SURGERY  1985    removal of uterine fibroids    ORTHOPEDIC SURGERY Right     knee cartilage    ORTHOPEDIC SURGERY Left 2005    foot surgery    ORTHOPEDIC SURGERY Left     knee meniscus    ORTHOPEDIC SURGERY Left     thumb    ORTHOPEDIC SURGERY Right 2008    ulnar release    ORTHOPEDIC SURGERY Left 2017    ulnar release    THORACIC SURGERY      periocardiocentesis       Social History     Socioeconomic History    Marital status:      Spouse name: Not on file    Number of children: Not on file    Years of education: Not on file    Highest education level: Not on file   Occupational History    Not on file   Tobacco Use    Smoking status: Never    Smokeless tobacco: Never   Substance and Sexual Activity    Alcohol use: Not Currently    Drug use: Not Currently    Sexual activity: Not on file   Other Topics Concern    Parent/sibling w/ CABG, MI or angioplasty before 65F 55M? Not Asked   Social History Narrative    .  Had exposure to second hand tobacco smoke as a child.    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     Social Determinants of Health     Financial Resource Strain: Not on file   Food Insecurity: Not on file   Transportation Needs: Not on file   Physical Activity: Not on file   Stress: Not on file   Social Connections: Not on file   Interpersonal Safety: Not on file   Housing Stability: Not on file       Family History   Problem Relation Age of Onset    Sjogren's Mother     Sjogren's Sister          Vitals: /71 (BP Location: Right arm, Patient Position: Sitting)   Pulse 88   Ht 1.537 m (5' 0.5\")   Wt 52.6 kg (116 lb)   SpO2 94%   BMI 22.28 kg/m      Exam:   GENERAL APPEARANCE: Well developed, well nourished, alert, and in no apparent distress.  RESP: good air flow throughout.  Bilateral inspiratory crackles in all lung fields. No rhonchi. No wheezes.  CV: Normal S1, S2, regular rhythm, normal rate. No murmur.  No LE edema.   MS: bony changes in hand joints as well as some effusion in joints of hands. No clubbing. No cyanosis.  SKIN: no rash on limited exam.  NEURO: Mentation intact, speech normal.  PSYCH: mentation appears normal. and affect normal/bright.    Results:  Recent Results (from the past 168 hour(s))   General PFT Lab (Please always keep checked)    Collection Time: 02/15/24 10:33 AM   Result Value Ref Range    FVC-Pred 2.27 L    FVC-Pre 1.80 L    FVC-%Pred-Pre 79 %    FEV1-Pre 1.56 L    FEV1-%Pred-Pre 86 %    FEV1FVC-Pred 79 %    FEV1FVC-Pre 86 %    FEFMax-Pred 5.01 L/sec    FEFMax-Pre 6.57 L/sec    FEFMax-%Pred-Pre 131 %    FEF2575-Pred 1.59 L/sec    FEF2575-Pre 2.08 L/sec    DAP7968-%Pred-Pre 130 %    ExpTime-Pre 4.09 sec    FIFMax-Pre 4.86 L/sec    VC-Pred 2.71 L    VC-Pre 1.77 L    VC-%Pred-Pre 65 %    IC-Pred 1.67 L    IC-Pre 1.46 L    IC-%Pred-Pre 87 %    ERV-Pred 0.83 L    " ERV-Pre 0.31 L    ERV-%Pred-Pre 36 %    FEV1FEV6-Pred 79 %    FEV1FEV6-Pre 86 %    FRCPleth-Pred 2.51 L    FRCPleth-Pre 1.52 L    FRCPleth-%Pred-Pre 60 %    RVPleth-Pred 1.93 L    RVPleth-Pre 1.22 L    RVPleth-%Pred-Pre 63 %    TLCPleth-Pred 4.35 L    TLCPleth-Pre 2.98 L    TLCPleth-%Pred-Pre 68 %    DLCOunc-Pred 17.23 ml/min/mmHg    DLCOunc-Pre 11.92 ml/min/mmHg    DLCOunc-%Pred-Pre 69 %    VA-Pre 2.74 L    VA-%Pred-Pre 68 %    FEV1SVC-Pred 66 %    FEV1SVC-Pre 88 %       Reviewed pulmonary function test that was performed today.  Patient continues to have consistent reduction in DLCO, though better than last time (69% predicted today, 62% predicted prior). PFT stable overall.     I reviewed results with the patient.        Assessment and plan:  Ms. 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   1.  Mild ILD associated with lupus.  PFTs stable and clinically, patient doing well with regard to symptoms. Will hold off on additional treatments at this time. Follow-up in 4 months with PFTs.    2.  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 Breathing has been better since she switched inhalers to Breo Ellipta. She is also on montelukast. She will continue these medications. Encouraged regular aerobic activity 30 min per day. She will continue prednisone and leflunomide per Dr. Dewey for her lupus.    3. Health Maintenance. Encouraged RSV vaccine.     Patient seen and discussed with pulmonology staff.     Moshe Winters MD  IM PGY3    I saw and evaluated patient with Resident.  Case discussed - agree with note.  I reviewed PFT that was done today.  This shows mild restriction with a mild diffusion defect, stable.    I spent 30 minutes reviewing chart, talking with and examining patient, reviewing test results, formulating plan and documentation on the day of the encounter (excluding PFT review).      KESHAWN KELLY,  M.D.

## 2024-02-15 NOTE — LETTER
2/15/2024         RE: Saira Pyle  [address] [place] [address]        Dear Colleague,    Thank you for referring your patient, Saira Pyle, to the [facility] [place]. Please see a copy of my visit note below.    [facility]    Reason for Visit  Saira Pyle is a 73 year old year old female who is being seen for Interstitial Lung Disease (ILD) (ILD follow up )  HPI  Ms. 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   She is followed by Dr. Dewey in rheumatology.  She did not tolerate mycophenolate in the past due to GI side effects.  She had an infusion reaction to rituximab in the past (tightness and burning in her throat).  Her bronchiolitis is being managed with albuterol (started on 10/24/2023 per patient) and montelukast, previously on Dulera but insurance refused to continue coverage.  She is using Breo Ellipta inhaler instead.  She also takes prednisone and leflunomide for her lupus.     She had COVID-19 during [place] time and took Paxlovid. Symptoms included fatigue and lack of appetite. Now back to baseline     Last was seen in ILD clinic on 10/26/2023. She has been tolerating the Breo Ellipta well, and she feels that she has been doing well on this inhaler. Breathing is better with activity. She can walk farther distances such as going from a parking lot to a store. Occasionally will have a dry cough at night.    No new headaches, vision changes, fevers, chills. Occasionally will have chest pain that she attributes to GERD. Gets nausea when she has a headache. No diarrhea, hematochezia, melena. No dysuria. No recent falls at home. Weight up 4 lbs since last visit in ILD clinic. Appetite has been good. Has not been doing specific cardio exercises. Has Raynaud occasionally.     Current  "medications include:   - Breo Ellipta daily  - montelukast 10mg daily  - albuterol inhaler q4h prn (not using)  - leflunomide 10mg daily  - prednisone 3mg daily    Social:   - she is planning to go to [place] this coming fall for a her 10-year wedding anniversary       Current Outpatient Medications   Medication    acyclovir (ZOVIRAX) 400 MG tablet    amLODIPine (NORVASC) 5 MG tablet    Ascorbic Acid (VITAMIN C PO)    calcium carbonate-vitamin D 600-200 MG-UNIT TABS    estradiol (ESTRACE) 0.1 MG/GM vaginal cream    estradiol (VAGIFEM) 10 MCG TABS vaginal tablet    fluticasone-vilanterol (BREO ELLIPTA) 100-25 MCG/ACT inhaler    glucosamine-chondroitin 500-400 MG CAPS per capsule    hydroxypropyl methylcellulose (GENTEAL) 0.2 % SOLN ophthalmic solution    leflunomide (ARAVA) 10 MG tablet    melatonin 5 MG tablet    methocarbamol (ROBAXIN) 500 MG tablet    montelukast (SINGULAIR) 10 MG tablet    pantoprazole (PROTONIX) 40 MG EC tablet    pimecrolimus (ELIDEL) 1 % cream    predniSONE (DELTASONE) 1 MG tablet    pregabalin (LYRICA) 75 MG capsule    rosuvastatin (CRESTOR) 5 MG tablet    triamcinolone (KENALOG) 0.1 % external cream    Vitamin D, Cholecalciferol, 1000 units CAPS    zinc gluconate 50 MG tablet    albuterol (PROAIR HFA/PROVENTIL HFA/VENTOLIN HFA) 108 (90 Base) MCG/ACT inhaler    fluticasone (FLOVENT HFA) 110 MCG/ACT inhaler    gabapentin (NEURONTIN) 100 MG capsule    LACTOBACILLUS ACID-PECTIN PO    tiotropium (SPIRIVA RESPIMAT) 2.5 MCG/ACT inhaler    Turmeric 500 MG TABS     No current facility-administered medications for this visit.     Allergies   Allergen Reactions    Acetaminophen Anaphylaxis    Influenza Vaccines Anaphylaxis    Rituximab Hives and Itching     Other reaction(s): Breathing Difficulty    Cephalexin Hives    Amitriptyline      PN: LW Reaction: agitation, irritability    Azithromycin Other (See Comments)     \"systemic organ failure\"    Cephalosporins Hives    Cimetidine      PN: LW Reaction: " GI Upset, vomiting    Codeine      PN: LW Reaction: Vomiting    Diazepam      hyperactive    Dronabinol Other (See Comments)     Other reaction(s): Headache  Nausea and vomiting    Fluoxetine Other (See Comments)     extreme agitation, cannot be combined for use with cipro    Metronidazole      Generalized illness    Metronidazole      Other reaction(s): Other - Describe In Comment Field  Extreme pain in legs    Miconazole      vaginal burning    Morphine Sulfate (Concentrate)      nausea and vomiting    Nitrofurantoin      Multiple organ failure    Nortriptyline Other (See Comments)     hyperactivity    Omeprazole      PN: LW Reaction: GI Upset    Oxycodone Other (See Comments)     Sensitive to taking oxycodone     Polymyxin B     Thimerosal (Thiomersal) Other (See Comments)     Severe buring, lupus flare    Tioconazole Other (See Comments)     Vaginal burning and bleeding    Erythromycin Rash     malaria  type reaction - fever and hair fell out    Iodine Rash     Needs to have Betadine washed off aftr surgery    Latex Rash     Other reaction(s): Other  redness    Levofloxacin Rash     PN: LW Reaction: Unknown Reaction    Quinolones Rash     happened with levaquin and Ciprofloxacin    Sulfa Antibiotics Rash    Tetracycline Rash     Past Medical History:   Diagnosis Date    Adverse effect of other specified systemic anti-infectives and antiparasitics, subsequent encounter     Anterior corneal dystrophy     Blepharitis     Bronchiolitis     small airways disease on chest CT 2017, probably related to SLE    Coronary artery disease     Cortical age-related cataract of both eyes     Disseminated retinitis and retinochoroiditis, pigment epitheliopathy     Diverticulitis of colon     GERD (gastroesophageal reflux disease)     Heart murmur     mitral valve disorder    Hypersomnia     ILD (interstitial lung disease) (H)     Mild ILD on chest CT 2017, probably related to SLE    Infection due to 2019 novel coronavirus      3/2022, did not require hospitalization; received monoclonal antibody    Keratopathy     MGD (meibomian gland dysfunction)     Osteopenia     Pericarditis     due to SLE, s/p pericardial window 2006    Plaquenil causing adverse effect in therapeutic use     Recurrent colitis due to Clostridium difficile     Reflux esophagitis     Restrictive lung disease     SLE (systemic lupus erythematosus related syndrome) (H)     dx in 20s; arthritis, serositis (pericarditis and pleuritis)    SLE (systemic lupus erythematosus related syndrome) (H)     Tracheostomy in place (H)        Past Surgical History:   Procedure Laterality Date    ARTHROPLASTY KNEE Right 10/2/2018    Procedure: ARTHROPLASTY KNEE;  RIGHT TOTAL KNEE ARTHROPLASTY ;  Surgeon: Kelsie Hardin MD;  Location: [facility]    ARTHROPLASTY KNEE Left 10/1/2019    Procedure: EXAM UNDER ANESTHEISIA  AND LEFT TOTAL KNEE ARTHROPLASTY;  Surgeon: Kelsie Hardin MD;  Location: [facility]    CHOLECYSTECTOMY  04/2004    GYN SURGERY  04/2001    LEE    GYN SURGERY  1985    removal of uterine fibroids    ORTHOPEDIC SURGERY Right     knee cartilage    ORTHOPEDIC SURGERY Left 2005    foot surgery    ORTHOPEDIC SURGERY Left     knee meniscus    ORTHOPEDIC SURGERY Left     thumb    ORTHOPEDIC SURGERY Right 2008    ulnar release    ORTHOPEDIC SURGERY Left 2017    ulnar release    THORACIC SURGERY      periocardiocentesis       Social History     Socioeconomic History    Marital status:      Spouse name: Not on file    Number of children: Not on file    Years of education: Not on file    Highest education level: Not on file   Occupational History    Not on file   Tobacco Use    Smoking status: Never    Smokeless tobacco: Never   Substance and Sexual Activity    Alcohol use: Not Currently    Drug use: Not Currently    Sexual activity: Not on file   Other Topics Concern    Parent/sibling w/ CABG, MI or angioplasty before 65F 55M? Not Asked   Social History Narrative    .   "Had exposure to second hand tobacco smoke as a child.    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     Social Determinants of Health     Financial Resource Strain: Not on file   Food Insecurity: Not on file   Transportation Needs: Not on file   Physical Activity: Not on file   Stress: Not on file   Social Connections: Not on file   Interpersonal Safety: Not on file   Housing Stability: Not on file       Family History   Problem Relation Age of Onset    Sjogren's Mother     Sjogren's Sister          Vitals: /71 (BP Location: Right arm, Patient Position: Sitting)   Pulse 88   Ht 1.537 m (5' 0.5\")   Wt 52.6 kg (116 lb)   SpO2 94%   BMI 22.28 kg/m      Exam:   GENERAL APPEARANCE: Well developed, well nourished, alert, and in no apparent distress.  RESP: good air flow throughout.  Bilateral inspiratory crackles in all lung fields. No rhonchi. No wheezes.  CV: Normal S1, S2, regular rhythm, normal rate. No murmur.  No LE edema.   MS: bony changes in hand joints as well as some effusion in joints of hands. No clubbing. No cyanosis.  SKIN: no rash on limited exam.  NEURO: Mentation intact, speech normal.  PSYCH: mentation appears normal. and affect normal/bright.    Results:  Recent Results (from the past 168 hour(s))   General PFT Lab (Please always keep checked)    Collection Time: 02/15/24 10:33 AM   Result Value Ref Range    FVC-Pred 2.27 L    FVC-Pre 1.80 L    FVC-%Pred-Pre 79 %    FEV1-Pre 1.56 L    FEV1-%Pred-Pre 86 %    FEV1FVC-Pred 79 %    FEV1FVC-Pre 86 %    FEFMax-Pred 5.01 L/sec    FEFMax-Pre 6.57 " L/sec    FEFMax-%Pred-Pre 131 %    FEF2575-Pred 1.59 L/sec    FEF2575-Pre 2.08 L/sec    TLO7017-%Pred-Pre 130 %    ExpTime-Pre 4.09 sec    FIFMax-Pre 4.86 L/sec    VC-Pred 2.71 L    VC-Pre 1.77 L    VC-%Pred-Pre 65 %    IC-Pred 1.67 L    IC-Pre 1.46 L    IC-%Pred-Pre 87 %    ERV-Pred 0.83 L    ERV-Pre 0.31 L    ERV-%Pred-Pre 36 %    FEV1FEV6-Pred 79 %    FEV1FEV6-Pre 86 %    FRCPleth-Pred 2.51 L    FRCPleth-Pre 1.52 L    FRCPleth-%Pred-Pre 60 %    RVPleth-Pred 1.93 L    RVPleth-Pre 1.22 L    RVPleth-%Pred-Pre 63 %    TLCPleth-Pred 4.35 L    TLCPleth-Pre 2.98 L    TLCPleth-%Pred-Pre 68 %    DLCOunc-Pred 17.23 ml/min/mmHg    DLCOunc-Pre 11.92 ml/min/mmHg    DLCOunc-%Pred-Pre 69 %    VA-Pre 2.74 L    VA-%Pred-Pre 68 %    FEV1SVC-Pred 66 %    FEV1SVC-Pre 88 %       Reviewed pulmonary function test that was performed today.  Patient continues to have consistent reduction in DLCO, though better than last time (69% predicted today, 62% predicted prior). PFT stable overall.     I reviewed results with the patient.        Assessment and plan:  Ms. 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   1.  Mild ILD associated with lupus.  PFTs stable and clinically, patient doing well with regard to symptoms. Will hold off on additional treatments at this time. Follow-up in 4 months with PFTs.    2.  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 Breathing has been better since she switched inhalers to Breo Ellipta. She is also on montelukast. She will continue these medications. Encouraged regular aerobic activity 30 min per day. She will continue prednisone and leflunomide per Dr. Dewey for her lupus.    3. Health Maintenance. Encouraged RSV vaccine.     Patient seen and discussed with pulmonology staff.     Moshe Winters MD  IM PGY3    I saw and evaluated patient with Resident.  Case discussed - agree with note.  I reviewed PFT that was done  today.  This shows mild restriction with a mild diffusion defect, stable.    I spent 30 minutes reviewing chart, talking with and examining patient, reviewing test results, formulating plan and documentation on the day of the encounter (excluding PFT review).      KESHAWN KELLY M.D.

## 2024-02-15 NOTE — NURSING NOTE
Chief Complaint   Patient presents with    Interstitial Lung Disease (ILD)     ILD follow up      Vitals were taken and medications were reconciled.     Therese NAGELA  11:19 AM

## 2024-02-16 LAB
DLCOUNC-%PRED-PRE: 69 %
DLCOUNC-PRE: 11.92 ML/MIN/MMHG
DLCOUNC-PRED: 17.23 ML/MIN/MMHG
ERV-%PRED-PRE: 36 %
ERV-PRE: 0.31 L
ERV-PRED: 0.83 L
EXPTIME-PRE: 4.09 SEC
FEF2575-%PRED-PRE: 130 %
FEF2575-PRE: 2.08 L/SEC
FEF2575-PRED: 1.59 L/SEC
FEFMAX-%PRED-PRE: 131 %
FEFMAX-PRE: 6.57 L/SEC
FEFMAX-PRED: 5.01 L/SEC
FEV1-%PRED-PRE: 86 %
FEV1-PRE: 1.56 L
FEV1FEV6-PRE: 86 %
FEV1FEV6-PRED: 79 %
FEV1FVC-PRE: 86 %
FEV1FVC-PRED: 79 %
FEV1SVC-PRE: 88 %
FEV1SVC-PRED: 66 %
FIFMAX-PRE: 4.86 L/SEC
FRCPLETH-%PRED-PRE: 60 %
FRCPLETH-PRE: 1.52 L
FRCPLETH-PRED: 2.51 L
FVC-%PRED-PRE: 79 %
FVC-PRE: 1.8 L
FVC-PRED: 2.27 L
IC-%PRED-PRE: 87 %
IC-PRE: 1.46 L
IC-PRED: 1.67 L
RVPLETH-%PRED-PRE: 63 %
RVPLETH-PRE: 1.22 L
RVPLETH-PRED: 1.93 L
TLCPLETH-%PRED-PRE: 68 %
TLCPLETH-PRE: 2.98 L
TLCPLETH-PRED: 4.35 L
VA-%PRED-PRE: 68 %
VA-PRE: 2.74 L
VC-%PRED-PRE: 65 %
VC-PRE: 1.77 L
VC-PRED: 2.71 L

## 2024-04-19 DIAGNOSIS — J21.9 BRONCHIOLITIS: ICD-10-CM

## 2024-04-22 RX ORDER — MONTELUKAST SODIUM 10 MG/1
1 TABLET ORAL
Qty: 90 TABLET | Refills: 0 | Status: SHIPPED | OUTPATIENT
Start: 2024-04-22 | End: 2024-07-31

## 2024-05-09 DIAGNOSIS — J21.9 BRONCHIOLITIS: ICD-10-CM

## 2024-05-09 DIAGNOSIS — J98.4 SMALL AIRWAYS DISEASE: ICD-10-CM

## 2024-05-09 DIAGNOSIS — J84.9 ILD (INTERSTITIAL LUNG DISEASE) (H): ICD-10-CM

## 2024-05-09 RX ORDER — FLUTICASONE FUROATE AND VILANTEROL TRIFENATATE 100; 25 UG/1; UG/1
1 POWDER RESPIRATORY (INHALATION) DAILY
Qty: 60 EACH | Refills: 3 | Status: SHIPPED | OUTPATIENT
Start: 2024-05-09

## 2024-05-25 ENCOUNTER — HEALTH MAINTENANCE LETTER (OUTPATIENT)
Age: 74
End: 2024-05-25

## 2024-06-06 ENCOUNTER — OFFICE VISIT (OUTPATIENT)
Dept: PULMONOLOGY | Facility: CLINIC | Age: 74
End: 2024-06-06
Attending: INTERNAL MEDICINE
Payer: COMMERCIAL

## 2024-06-06 VITALS
WEIGHT: 114 LBS | HEIGHT: 61 IN | DIASTOLIC BLOOD PRESSURE: 80 MMHG | SYSTOLIC BLOOD PRESSURE: 138 MMHG | OXYGEN SATURATION: 96 % | HEART RATE: 83 BPM | BODY MASS INDEX: 21.52 KG/M2

## 2024-06-06 DIAGNOSIS — J84.9 ILD (INTERSTITIAL LUNG DISEASE) (H): Primary | ICD-10-CM

## 2024-06-06 DIAGNOSIS — J21.9 BRONCHIOLITIS: ICD-10-CM

## 2024-06-06 DIAGNOSIS — J84.9 ILD (INTERSTITIAL LUNG DISEASE) (H): ICD-10-CM

## 2024-06-06 PROCEDURE — 94375 RESPIRATORY FLOW VOLUME LOOP: CPT | Performed by: INTERNAL MEDICINE

## 2024-06-06 PROCEDURE — 94726 PLETHYSMOGRAPHY LUNG VOLUMES: CPT | Performed by: INTERNAL MEDICINE

## 2024-06-06 PROCEDURE — 99215 OFFICE O/P EST HI 40 MIN: CPT | Mod: 25 | Performed by: INTERNAL MEDICINE

## 2024-06-06 PROCEDURE — 94729 DIFFUSING CAPACITY: CPT | Performed by: INTERNAL MEDICINE

## 2024-06-06 PROCEDURE — 99213 OFFICE O/P EST LOW 20 MIN: CPT | Performed by: INTERNAL MEDICINE

## 2024-06-06 RX ORDER — ROTIGOTINE 2 MG/24H
2 PATCH, EXTENDED RELEASE TRANSDERMAL
COMMUNITY
Start: 2024-05-31

## 2024-06-06 ASSESSMENT — PAIN SCALES - GENERAL: PAINLEVEL: NO PAIN (0)

## 2024-06-06 NOTE — PROGRESS NOTES
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   She is followed by Dr. Dewey in rheumatology.  She did not tolerate mycophenolate in the past due to GI side effects.  She had an infusion reaction to rituximab in the past (tightness and burning in her throat).  Her bronchiolitis is being managed with albuterol (started on 10/24/2023) and montelukast and Breo Ellipta inhaler.  She also takes prednisone and leflunomide for her lupus.  She had COVID-19 during [place] time and took Paxlovid.     Today, Saira reports that she is doing well.  She sometimes feels short of breath if she is riding her exercise bike at a fast pace.  Otherwise, her breathing feels fine.  She does endorse chest tightness if she walks quickly.  She can walk up a hill without feeling short of breath and denies paroxysmal nocturnal dyspnea, fevers, new skin rashes or syncope.  She does endorse a cough occasionally during the night.  She reports that she had a joint flare a couple weeks ago, and prednisone was increased to 10 mg daily.  She is currently at 5 mg daily and will taper down to 3 mg daily.    She and her  are planning a trip to [place] in September/October.  She will try to get the COVID-19 vaccine before they leave on their trip.  She does not get the flu vaccine because she had a bad reaction in the past.            Current Outpatient Medications   Medication Sig Dispense Refill    acyclovir (ZOVIRAX) 400 MG tablet Take 400 mg by mouth every morning       amLODIPine (NORVASC) 5 MG tablet Take 5 mg by mouth daily as needed For Raynauds Syndrome. Used seasonally when temperature is below 40 degrees.      Ascorbic Acid (VITAMIN C PO) Take  500 mg by mouth every morning       calcium carbonate-vitamin D 600-200 MG-UNIT TABS Take 1 tablet by mouth daily       estradiol (ESTRACE) 0.1 MG/GM vaginal cream Place 1 g vaginally once a week       estradiol (VAGIFEM) 10 MCG TABS vaginal tablet Place 10 mcg vaginally once a week (with Estrace cream.)      fluticasone-vilanterol (BREO ELLIPTA) 100-25 MCG/ACT inhaler Inhale 1 puff into the lungs daily 60 each 3    glucosamine-chondroitin 500-400 MG CAPS per capsule Take 1 capsule by mouth daily      hydroxypropyl methylcellulose (GENTEAL) 0.2 % SOLN ophthalmic solution Place 1 drop into both eyes At Bedtime       leflunomide (ARAVA) 10 MG tablet Take 10 mg by mouth every morning       melatonin 5 MG tablet Take 5 mg by mouth At Bedtime (Takes 2 x 5mg = 10mg)      methocarbamol (ROBAXIN) 500 MG tablet Take 500 mg by mouth      montelukast (SINGULAIR) 10 MG tablet TAKE ONE TABLET BY MOUTH AT BEDTIME 90 tablet 0    NEUPRO 2 MG/24HR 24 hr patch Place 2 mg onto the skin      pantoprazole (PROTONIX) 40 MG EC tablet Take 40 mg by mouth daily      pimecrolimus (ELIDEL) 1 % cream Apply topically daily as needed       predniSONE (DELTASONE) 1 MG tablet Take 3 mg by mouth daily (with breakfast)       pregabalin (LYRICA) 75 MG capsule Take 75 mg by mouth at bedtime      rosuvastatin (CRESTOR) 5 MG tablet Take 5 mg by mouth See Admin Instructions Takes 5 mg  for 2 consecutive days then 1 day off.      triamcinolone (KENALOG) 0.1 % external cream Apply topically 2 times daily as needed for irritation      Vitamin D, Cholecalciferol, 1000 units CAPS Take 1,000 Units by mouth 2 times daily       zinc gluconate 50 MG tablet Take 50 mg by mouth daily       albuterol (PROAIR HFA/PROVENTIL HFA/VENTOLIN HFA) 108 (90 Base) MCG/ACT inhaler Inhale 2 puffs into the lungs every 4 hours as needed for shortness of breath, wheezing or cough 18 g 1    gabapentin (NEURONTIN) 100 MG capsule Take 300 mg by mouth At Bedtime (Takes 3 x 100mg =  "300mg)      LACTOBACILLUS ACID-PECTIN PO Take 1 tablet by mouth daily      Turmeric 500 MG TABS Take 500 mg by mouth every morning       No current facility-administered medications for this visit.     Allergies   Allergen Reactions    Acetaminophen Anaphylaxis    Influenza Vaccines Anaphylaxis    Rituximab Hives and Itching     Other reaction(s): Breathing Difficulty    Cephalexin Hives    Amitriptyline      PN: LW Reaction: agitation, irritability    Azithromycin Other (See Comments)     \"systemic organ failure\"    Cephalosporins Hives    Cimetidine      PN: LW Reaction: GI Upset, vomiting    Codeine      PN: LW Reaction: Vomiting    Diazepam      hyperactive    Dronabinol Other (See Comments)     Other reaction(s): Headache  Nausea and vomiting    Fluoxetine Other (See Comments)     extreme agitation, cannot be combined for use with cipro    Metronidazole      Generalized illness    Metronidazole      Other reaction(s): Other - Describe In Comment Field  Extreme pain in legs    Miconazole      vaginal burning    Morphine Sulfate (Concentrate)      nausea and vomiting    Nitrofurantoin      Multiple organ failure    Nortriptyline Other (See Comments)     hyperactivity    Omeprazole      PN: LW Reaction: GI Upset    Oxycodone Other (See Comments)     Sensitive to taking oxycodone     Polymyxin B     Thimerosal (Thiomersal) Other (See Comments)     Severe buring, lupus flare    Tioconazole Other (See Comments)     Vaginal burning and bleeding    Tramadol Other (See Comments) and Unknown     Other Reaction(s): too strong, amnesia    Too strong, amnesia    Erythromycin Rash     malaria  type reaction - fever and hair fell out    Iodine Rash     Needs to have Betadine washed off aftr surgery    Latex Rash     Other reaction(s): Other  redness    Levofloxacin Rash     PN: LW Reaction: Unknown Reaction    Quinolones Rash     happened with levaquin and Ciprofloxacin    Sulfa Antibiotics Rash    Tetracycline Rash     Past " Medical History:   Diagnosis Date    Adverse effect of other specified systemic anti-infectives and antiparasitics, subsequent encounter     Anterior corneal dystrophy     Blepharitis     Bronchiolitis     small airways disease on chest CT 2017, probably related to SLE    Coronary artery disease     Cortical age-related cataract of both eyes     Disseminated retinitis and retinochoroiditis, pigment epitheliopathy     Diverticulitis of colon     GERD (gastroesophageal reflux disease)     Heart murmur     mitral valve disorder    Hypersomnia     ILD (interstitial lung disease) (H)     Mild ILD on chest CT 2017, probably related to SLE    Infection due to 2019 novel coronavirus     3/2022, did not require hospitalization; received monoclonal antibody    Keratopathy     MGD (meibomian gland dysfunction)     Osteopenia     Pericarditis     due to SLE, s/p pericardial window 2006    Plaquenil causing adverse effect in therapeutic use     Recurrent colitis due to Clostridium difficile     Reflux esophagitis     Restrictive lung disease     SLE (systemic lupus erythematosus related syndrome) (H)     dx in 20s; arthritis, serositis (pericarditis and pleuritis)    SLE (systemic lupus erythematosus related syndrome) (H)     Tracheostomy in place (H)        Past Surgical History:   Procedure Laterality Date    ARTHROPLASTY KNEE Right 10/2/2018    Procedure: ARTHROPLASTY KNEE;  RIGHT TOTAL KNEE ARTHROPLASTY ;  Surgeon: Kelsie Hardin MD;  Location:  OR    ARTHROPLASTY KNEE Left 10/1/2019    Procedure: EXAM UNDER ANESTHEISIA  AND LEFT TOTAL KNEE ARTHROPLASTY;  Surgeon: Kelsie Hardin MD;  Location:  OR    CHOLECYSTECTOMY  04/2004    GYN SURGERY  04/2001    LEEP    GYN SURGERY  1985    removal of uterine fibroids    ORTHOPEDIC SURGERY Right     knee cartilage    ORTHOPEDIC SURGERY Left 2005    foot surgery    ORTHOPEDIC SURGERY Left     knee meniscus    ORTHOPEDIC SURGERY Left     thumb    ORTHOPEDIC SURGERY Right  2008    ulnar release    ORTHOPEDIC SURGERY Left 2017    ulnar release    THORACIC SURGERY      periocardiocentesis       Social History     Socioeconomic History    Marital status:      Spouse name: Not on file    Number of children: Not on file    Years of education: Not on file    Highest education level: Not on file   Occupational History    Not on file   Tobacco Use    Smoking status: Never    Smokeless tobacco: Never   Substance and Sexual Activity    Alcohol use: Not Currently    Drug use: Not Currently    Sexual activity: Not on file   Other Topics Concern    Parent/sibling w/ CABG, MI or angioplasty before 65F 55M? Not Asked   Social History Narrative    .  Had exposure to second hand tobacco smoke as a child.    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     Social Determinants of Health     Financial Resource Strain: Low Risk  (2/14/2024)    Received [facility] [employer] [facility], [employer] [facility]    Financial Resource Strain     Difficulty of Paying Living Expenses: 3     Difficulty of Paying Living Expenses: Not on file   Food Insecurity: No Food Insecurity (2/14/2024)    [facility], [employer] [facility] [employer] [facility]    Food Insecurity     Worried About Running Out of Food in the Last Year: 1   Transportation Needs: No Transportation Needs (2/14/2024)    Received from [employer] [facility], [employer] [facility] [employer] [facility]    Transportation Needs  "    Lack of Transportation (Medical): 1   Physical Activity: Not on file   Stress: Not on file   Social Connections: Unknown (1/1/2022)    Received from [employer] [facility], [employer] [facility]    Social Connections     Frequency of Communication with Friends and Family: Not on file   Interpersonal Safety: Not on file   Housing Stability: Low Risk  (2/14/2024)    Received from [employer] [facility], [employer] [facility]    Housing Stability     Unable to Pay for Housing in the Last Year: 1       Family History   Problem Relation Age of Onset    Sjogren's Mother     Sjogren's Sister            Vitals: /80 (BP Location: Right arm, Patient Position: Sitting, Cuff Size: Adult Regular)   Pulse 83   Ht 1.537 m (5' 0.5\")   Wt 51.7 kg (114 lb)   SpO2 96%   BMI 21.90 kg/m      Exam:   GENERAL APPEARANCE: Well developed, well nourished, alert, and in no apparent distress.  RESP: good air flow throughout.  Bilateral inspiratory crackles. No rhonchi. No wheezes.  Occasional coarse inspiratory squeaks.  CV: Normal S1, S2, regular rhythm, normal rate. No murmur.  No LE edema.   MS: bony changes in hand joints. No clubbing. No cyanosis.  SKIN: no rash on limited exam.  NEURO: Mentation intact, speech normal.  PSYCH: mentation appears normal. and affect normal/bright.      Results:  Recent Results (from the past 168 hour(s))   General PFT Lab (Please always keep checked)    Collection Time: 06/06/24 10:33 AM   Result Value Ref Range    FVC-Pred 2.26 L    FVC-Pre 1.77 L    FVC-%Pred-Pre 78 %    FEV1-Pre 1.51 L    FEV1-%Pred-Pre 84 %    FEV1FVC-Pred 79 %    FEV1FVC-Pre 86 %    FEFMax-Pred 4.98 L/sec    FEFMax-Pre 6.81 L/sec    FEFMax-%Pred-Pre 136 %    FEF2575-Pred 1.58 L/sec    FEF2575-Pre 1.95 L/sec    SOO9332-%Pred-Pre 123 %    ExpTime-Pre 5.77 sec    FIFMax-Pre 5.60 L/sec    VC-Pred 2.70 L    VC-Pre 1.71 L    VC-%Pred-Pre " 63 %    IC-Pred 1.66 L    IC-Pre 1.40 L    IC-%Pred-Pre 84 %    ERV-Pred 0.83 L    ERV-Pre 0.31 L    ERV-%Pred-Pre 37 %    FEV1FEV6-Pred 79 %    FEV1FEV6-Pre 86 %    FRCPleth-Pred 2.51 L    FRCPleth-Pre 1.84 L    FRCPleth-%Pred-Pre 73 %    RVPleth-Pred 1.93 L    RVPleth-Pre 1.53 L    RVPleth-%Pred-Pre 79 %    TLCPleth-Pred 4.35 L    TLCPleth-Pre 3.24 L    TLCPleth-%Pred-Pre 74 %    DLCOunc-Pred 17.21 ml/min/mmHg    DLCOunc-Pre 12.23 ml/min/mmHg    DLCOunc-%Pred-Pre 71 %    VA-Pre 2.77 L    VA-%Pred-Pre 68 %    FEV1SVC-Pred 66 %    FEV1SVC-Pre 88 %       I reviewed pulmonary function test that was performed today.  This shows mild restriction with a mild diffusion defect.  Lung function is stable over the past year and a half.    I reviewed results with the patient.        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   1.  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  Symptoms are stable, and she is tolerating her medications well.  We will continue montelukast 10 mg daily and Breo Ellipta inhaler.  She continues on prednisone 5 mg daily and leflunomide for her lupus, and those medications are managed by her rheumatologist.  She will return in 4 months with full PFT.  2.  ILD associated with lupus.  PFT is stable, and her pulmonary symptoms are minimal.  I would not start any new medications for her ILD unless she develops new symptoms or worsening PFT.  If that occurs, then I would do high-resolution chest CT first to evaluate.  There are options for medication treatment if needed in the future, including antifibrotic therapy versus immunosuppression.  She will return to clinic in 4 months with full PFT.  I have encouraged her to continue riding her exercise bike daily.  3.  Healthcare maintenance.  I encouraged her to get another COVID-19 vaccine now.  The longitudinal plan of care for the  diagnosis(es)/condition(s) as documented were addressed during this visit. Due to the added complexity in care, I will continue to support Saira in the subsequent management and with ongoing continuity of care.  I spent 41 minutes reviewing chart, talking with and examining patient, reviewing test results, formulating plan and documentation on the day of the encounter (excluding PFT review).

## 2024-06-06 NOTE — NURSING NOTE
Chief Complaint   Patient presents with    Interstitial Lung Disease (ILD)     6 month follow up      Vitals were taken and medications were reconciled.    Therese Higgins RMA  10:51 AM

## 2024-06-06 NOTE — LETTER
6/6/2024      Saira Pyle  [address]  [facility] [address]      Dear Colleague,    Thank you for referring your patient, Saira Pyle, to the [facility] FOR LUNG SCIENCE AND [facility] [place]. Please see a copy of my visit note below.    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   She is followed by Dr. Dewey in rheumatology.  She did not tolerate mycophenolate in the past due to GI side effects.  She had an infusion reaction to rituximab in the past (tightness and burning in her throat).  Her bronchiolitis is being managed with albuterol (started on 10/24/2023) and montelukast and Breo Ellipta inhaler.  She also takes prednisone and leflunomide for her lupus.  She had COVID-19 during [place] time and took Paxlovid.     Today, Saira reports that she is doing well.  She sometimes feels short of breath if she is riding her exercise bike at a fast pace.  Otherwise, her breathing feels fine.  She does endorse chest tightness if she walks quickly.  She can walk up a hill without feeling short of breath and denies paroxysmal nocturnal dyspnea, fevers, new skin rashes or syncope.  She does endorse a cough occasionally during the night.  She reports that she had a joint flare a couple weeks ago, and prednisone was increased to 10 mg daily.  She is currently at 5 mg daily and will taper down to 3 mg daily.    She and her  are planning a trip to [place] in September/October.  She will try to get the COVID-19 vaccine before they leave on their trip.  She does not get the flu vaccine because she had a bad reaction in the past.            Current Outpatient Medications    Medication Sig Dispense Refill     acyclovir (ZOVIRAX) 400 MG tablet Take 400 mg by mouth every morning        amLODIPine (NORVASC) 5 MG tablet Take 5 mg by mouth daily as needed For Raynauds Syndrome. Used seasonally when temperature is below 40 degrees.       Ascorbic Acid (VITAMIN C PO) Take 500 mg by mouth every morning        calcium carbonate-vitamin D 600-200 MG-UNIT TABS Take 1 tablet by mouth daily        estradiol (ESTRACE) 0.1 MG/GM vaginal cream Place 1 g vaginally once a week        estradiol (VAGIFEM) 10 MCG TABS vaginal tablet Place 10 mcg vaginally once a week (with Estrace cream.)       fluticasone-vilanterol (BREO ELLIPTA) 100-25 MCG/ACT inhaler Inhale 1 puff into the lungs daily 60 each 3     glucosamine-chondroitin 500-400 MG CAPS per capsule Take 1 capsule by mouth daily       hydroxypropyl methylcellulose (GENTEAL) 0.2 % SOLN ophthalmic solution Place 1 drop into both eyes At Bedtime        leflunomide (ARAVA) 10 MG tablet Take 10 mg by mouth every morning        melatonin 5 MG tablet Take 5 mg by mouth At Bedtime (Takes 2 x 5mg = 10mg)       methocarbamol (ROBAXIN) 500 MG tablet Take 500 mg by mouth       montelukast (SINGULAIR) 10 MG tablet TAKE ONE TABLET BY MOUTH AT BEDTIME 90 tablet 0     NEUPRO 2 MG/24HR 24 hr patch Place 2 mg onto the skin       pantoprazole (PROTONIX) 40 MG EC tablet Take 40 mg by mouth daily       pimecrolimus (ELIDEL) 1 % cream Apply topically daily as needed        predniSONE (DELTASONE) 1 MG tablet Take 3 mg by mouth daily (with breakfast)        pregabalin (LYRICA) 75 MG capsule Take 75 mg by mouth at bedtime       rosuvastatin (CRESTOR) 5 MG tablet Take 5 mg by mouth See Admin Instructions Takes 5 mg  for 2 consecutive days then 1 day off.       triamcinolone (KENALOG) 0.1 % external cream Apply topically 2 times daily as needed for irritation       Vitamin D, Cholecalciferol, 1000 units CAPS Take 1,000 Units by mouth 2 times daily        zinc gluconate 50 MG  "tablet Take 50 mg by mouth daily        albuterol (PROAIR HFA/PROVENTIL HFA/VENTOLIN HFA) 108 (90 Base) MCG/ACT inhaler Inhale 2 puffs into the lungs every 4 hours as needed for shortness of breath, wheezing or cough 18 g 1     gabapentin (NEURONTIN) 100 MG capsule Take 300 mg by mouth At Bedtime (Takes 3 x 100mg = 300mg)       LACTOBACILLUS ACID-PECTIN PO Take 1 tablet by mouth daily       Turmeric 500 MG TABS Take 500 mg by mouth every morning       No current facility-administered medications for this visit.     Allergies   Allergen Reactions     Acetaminophen Anaphylaxis     Influenza Vaccines Anaphylaxis     Rituximab Hives and Itching     Other reaction(s): Breathing Difficulty     Cephalexin Hives     Amitriptyline      PN: LW Reaction: agitation, irritability     Azithromycin Other (See Comments)     \"systemic organ failure\"     Cephalosporins Hives     Cimetidine      PN: LW Reaction: GI Upset, vomiting     Codeine      PN: LW Reaction: Vomiting     Diazepam      hyperactive     Dronabinol Other (See Comments)     Other reaction(s): Headache  Nausea and vomiting     Fluoxetine Other (See Comments)     extreme agitation, cannot be combined for use with cipro     Metronidazole      Generalized illness     Metronidazole      Other reaction(s): Other - Describe In Comment Field  Extreme pain in legs     Miconazole      vaginal burning     Morphine Sulfate (Concentrate)      nausea and vomiting     Nitrofurantoin      Multiple organ failure     Nortriptyline Other (See Comments)     hyperactivity     Omeprazole      PN: LW Reaction: GI Upset     Oxycodone Other (See Comments)     Sensitive to taking oxycodone      Polymyxin B      Thimerosal (Thiomersal) Other (See Comments)     Severe buring, lupus flare     Tioconazole Other (See Comments)     Vaginal burning and bleeding     Tramadol Other (See Comments) and Unknown     Other Reaction(s): too strong, amnesia    Too strong, amnesia     Erythromycin Rash     " malaria  type reaction - fever and hair fell out     Iodine Rash     Needs to have Betadine washed off aftr surgery     Latex Rash     Other reaction(s): Other  redness     Levofloxacin Rash     PN: LW Reaction: Unknown Reaction     Quinolones Rash     happened with levaquin and Ciprofloxacin     Sulfa Antibiotics Rash     Tetracycline Rash     Past Medical History:   Diagnosis Date     Adverse effect of other specified systemic anti-infectives and antiparasitics, subsequent encounter      Anterior corneal dystrophy      Blepharitis      Bronchiolitis     small airways disease on chest CT 2017, probably related to SLE     Coronary artery disease      Cortical age-related cataract of both eyes      Disseminated retinitis and retinochoroiditis, pigment epitheliopathy      Diverticulitis of colon      GERD (gastroesophageal reflux disease)      Heart murmur     mitral valve disorder     Hypersomnia      ILD (interstitial lung disease) (H)     Mild ILD on chest CT 2017, probably related to SLE     Infection due to 2019 novel coronavirus     3/2022, did not require hospitalization; received monoclonal antibody     Keratopathy      MGD (meibomian gland dysfunction)      Osteopenia      Pericarditis     due to SLE, s/p pericardial window 2006     Plaquenil causing adverse effect in therapeutic use      Recurrent colitis due to Clostridium difficile      Reflux esophagitis      Restrictive lung disease      SLE (systemic lupus erythematosus related syndrome) (H)     dx in 20s; arthritis, serositis (pericarditis and pleuritis)     SLE (systemic lupus erythematosus related syndrome) (H)      Tracheostomy in place (H)        Past Surgical History:   Procedure Laterality Date     ARTHROPLASTY KNEE Right 10/2/2018    Procedure: ARTHROPLASTY KNEE;  RIGHT TOTAL KNEE ARTHROPLASTY ;  Surgeon: Kelsie Hardin MD;  Location:  OR     ARTHROPLASTY KNEE Left 10/1/2019    Procedure: EXAM UNDER ANESTHEISIA  AND LEFT TOTAL KNEE  ARTHROPLASTY;  Surgeon: Kelsie Hardin MD;  Location: [facility] OR     CHOLECYSTECTOMY  04/2004     GYN SURGERY  04/2001    LEEP     GYN SURGERY  1985    removal of uterine fibroids     ORTHOPEDIC SURGERY Right     knee cartilage     ORTHOPEDIC SURGERY Left 2005    foot surgery     ORTHOPEDIC SURGERY Left     knee meniscus     ORTHOPEDIC SURGERY Left     thumb     ORTHOPEDIC SURGERY Right 2008    ulnar release     ORTHOPEDIC SURGERY Left 2017    ulnar release     THORACIC SURGERY      periocardiocentesis       Social History     Socioeconomic History     Marital status:      Spouse name: Not on file     Number of children: Not on file     Years of education: Not on file     Highest education level: Not on file   Occupational History     Not on file   Tobacco Use     Smoking status: Never     Smokeless tobacco: Never   Substance and Sexual Activity     Alcohol use: Not Currently     Drug use: Not Currently     Sexual activity: Not on file   Other Topics Concern     Parent/sibling w/ CABG, MI or angioplasty before 65F 55M? Not Asked   Social History Narrative    .  Had exposure to second hand tobacco smoke as a child.    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     Social Determinants of Health     Financial Resource Strain: Low Risk  (2/14/2024)    [facility] [employer] [facility]    Financial Resource Strain      Difficulty of Paying Living Expenses: 3      Difficulty of Paying Living Expenses: Not on file   Food Insecurity: No  "Food Insecurity (2/14/2024)    Received from [facility], [facility]    Food Insecurity      Worried About Running Out of Food in the Last Year: 1   Transportation Needs: No Transportation Needs (2/14/2024)    Received from [facility], [facility]    Transportation Needs      Lack of Transportation (Medical): 1   Physical Activity: Not on file   Stress: Not on file   Social Connections: Unknown (1/1/2022)    Received from [facility], [facility]    Social Connections      Frequency of Communication with Friends and Family: Not on file   Interpersonal Safety: Not on file   Housing Stability: Low Risk  (2/14/2024)    Received from [facility], [facility]    Housing Stability      Unable to Pay for Housing in the Last Year: 1       Family History   Problem Relation Age of Onset     Sjogren's Mother      Sjogren's Sister            Vitals: /80 (BP Location: Right arm, Patient Position: Sitting, Cuff Size: Adult Regular)   Pulse 83   Ht 1.537 m (5' 0.5\")   Wt 51.7 kg (114 lb)   SpO2 96%   BMI 21.90 kg/m      Exam:   GENERAL APPEARANCE: Well developed, well nourished, alert, and in no apparent distress.  RESP: good air flow throughout.  Bilateral inspiratory crackles. No rhonchi. No wheezes.  Occasional coarse inspiratory squeaks.  CV: Normal S1, S2, regular rhythm, normal rate. No murmur.  No LE edema.   MS: bony changes in hand joints. No clubbing. No cyanosis.  SKIN: no rash on limited exam.  NEURO: Mentation intact, speech normal.  PSYCH: mentation appears normal. and affect normal/bright.      Results:  Recent Results (from the past 168 hour(s))   General PFT Lab (Please always keep checked)    Collection Time: 06/06/24 10:33 AM   Result Value " Ref Range    FVC-Pred 2.26 L    FVC-Pre 1.77 L    FVC-%Pred-Pre 78 %    FEV1-Pre 1.51 L    FEV1-%Pred-Pre 84 %    FEV1FVC-Pred 79 %    FEV1FVC-Pre 86 %    FEFMax-Pred 4.98 L/sec    FEFMax-Pre 6.81 L/sec    FEFMax-%Pred-Pre 136 %    FEF2575-Pred 1.58 L/sec    FEF2575-Pre 1.95 L/sec    ABC3516-%Pred-Pre 123 %    ExpTime-Pre 5.77 sec    FIFMax-Pre 5.60 L/sec    VC-Pred 2.70 L    VC-Pre 1.71 L    VC-%Pred-Pre 63 %    IC-Pred 1.66 L    IC-Pre 1.40 L    IC-%Pred-Pre 84 %    ERV-Pred 0.83 L    ERV-Pre 0.31 L    ERV-%Pred-Pre 37 %    FEV1FEV6-Pred 79 %    FEV1FEV6-Pre 86 %    FRCPleth-Pred 2.51 L    FRCPleth-Pre 1.84 L    FRCPleth-%Pred-Pre 73 %    RVPleth-Pred 1.93 L    RVPleth-Pre 1.53 L    RVPleth-%Pred-Pre 79 %    TLCPleth-Pred 4.35 L    TLCPleth-Pre 3.24 L    TLCPleth-%Pred-Pre 74 %    DLCOunc-Pred 17.21 ml/min/mmHg    DLCOunc-Pre 12.23 ml/min/mmHg    DLCOunc-%Pred-Pre 71 %    VA-Pre 2.77 L    VA-%Pred-Pre 68 %    FEV1SVC-Pred 66 %    FEV1SVC-Pre 88 %       I reviewed pulmonary function test that was performed today.  This shows mild restriction with a mild diffusion defect.  Lung function is stable over the past year and a half.    I reviewed results with the patient.        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   1.  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  Symptoms are stable, and she is tolerating her medications well.  We will continue montelukast 10 mg daily and Breo Ellipta inhaler.  She continues on prednisone 5 mg daily and leflunomide for her lupus, and those medications are managed by her rheumatologist.  She will return in 4 months with full PFT.  2.  ILD associated with lupus.  PFT is stable, and her pulmonary symptoms are minimal.  I would not start any new medications for her ILD unless she develops new symptoms or worsening PFT.  If that occurs, then I would do high-resolution  chest CT first to evaluate.  There are options for medication treatment if needed in the future, including antifibrotic therapy versus immunosuppression.  She will return to clinic in 4 months with full PFT.  I have encouraged her to continue riding her exercise bike daily.  3.  Healthcare maintenance.  I encouraged her to get another COVID-19 vaccine now.  The longitudinal plan of care for the diagnosis(es)/condition(s) as documented were addressed during this visit. Due to the added complexity in care, I will continue to support Saira in the subsequent management and with ongoing continuity of care.  I spent 41 minutes reviewing chart, talking with and examining patient, reviewing test results, formulating plan and documentation on the day of the encounter (excluding PFT review).            Again, thank you for allowing me to participate in the care of your patient.        Sincerely,        Petra Valentin MD

## 2024-06-07 LAB
DLCOUNC-%PRED-PRE: 71 %
DLCOUNC-PRE: 12.23 ML/MIN/MMHG
DLCOUNC-PRED: 17.21 ML/MIN/MMHG
ERV-%PRED-PRE: 37 %
ERV-PRE: 0.31 L
ERV-PRED: 0.83 L
EXPTIME-PRE: 5.77 SEC
FEF2575-%PRED-PRE: 123 %
FEF2575-PRE: 1.95 L/SEC
FEF2575-PRED: 1.58 L/SEC
FEFMAX-%PRED-PRE: 136 %
FEFMAX-PRE: 6.81 L/SEC
FEFMAX-PRED: 4.98 L/SEC
FEV1-%PRED-PRE: 84 %
FEV1-PRE: 1.51 L
FEV1FEV6-PRE: 86 %
FEV1FEV6-PRED: 79 %
FEV1FVC-PRE: 86 %
FEV1FVC-PRED: 79 %
FEV1SVC-PRE: 88 %
FEV1SVC-PRED: 66 %
FIFMAX-PRE: 5.6 L/SEC
FRCPLETH-%PRED-PRE: 73 %
FRCPLETH-PRE: 1.84 L
FRCPLETH-PRED: 2.51 L
FVC-%PRED-PRE: 78 %
FVC-PRE: 1.77 L
FVC-PRED: 2.26 L
IC-%PRED-PRE: 84 %
IC-PRE: 1.4 L
IC-PRED: 1.66 L
RVPLETH-%PRED-PRE: 79 %
RVPLETH-PRE: 1.53 L
RVPLETH-PRED: 1.93 L
TLCPLETH-%PRED-PRE: 74 %
TLCPLETH-PRE: 3.24 L
TLCPLETH-PRED: 4.35 L
VA-%PRED-PRE: 68 %
VA-PRE: 2.77 L
VC-%PRED-PRE: 63 %
VC-PRE: 1.71 L
VC-PRED: 2.7 L

## 2024-07-31 DIAGNOSIS — J21.9 BRONCHIOLITIS: ICD-10-CM

## 2024-07-31 RX ORDER — MONTELUKAST SODIUM 10 MG/1
1 TABLET ORAL
Qty: 90 TABLET | Refills: 0 | Status: SHIPPED | OUTPATIENT
Start: 2024-07-31

## 2024-10-21 ENCOUNTER — TRANSFERRED RECORDS (OUTPATIENT)
Dept: HEALTH INFORMATION MANAGEMENT | Facility: CLINIC | Age: 74
End: 2024-10-21
Payer: COMMERCIAL

## 2024-10-24 ENCOUNTER — OFFICE VISIT (OUTPATIENT)
Dept: PULMONOLOGY | Facility: CLINIC | Age: 74
End: 2024-10-24
Attending: INTERNAL MEDICINE
Payer: COMMERCIAL

## 2024-10-24 ENCOUNTER — LAB (OUTPATIENT)
Dept: LAB | Facility: CLINIC | Age: 74
End: 2024-10-24
Payer: COMMERCIAL

## 2024-10-24 VITALS
BODY MASS INDEX: 23.36 KG/M2 | HEIGHT: 60 IN | RESPIRATION RATE: 18 BRPM | SYSTOLIC BLOOD PRESSURE: 128 MMHG | OXYGEN SATURATION: 96 % | DIASTOLIC BLOOD PRESSURE: 80 MMHG | HEART RATE: 84 BPM | WEIGHT: 119 LBS

## 2024-10-24 DIAGNOSIS — J21.9 BRONCHIOLITIS: ICD-10-CM

## 2024-10-24 DIAGNOSIS — J84.9 ILD (INTERSTITIAL LUNG DISEASE) (H): ICD-10-CM

## 2024-10-24 DIAGNOSIS — J84.9 ILD (INTERSTITIAL LUNG DISEASE) (H): Primary | ICD-10-CM

## 2024-10-24 PROCEDURE — 99213 OFFICE O/P EST LOW 20 MIN: CPT | Performed by: INTERNAL MEDICINE

## 2024-10-24 PROCEDURE — 94150 VITAL CAPACITY TEST: CPT | Performed by: INTERNAL MEDICINE

## 2024-10-24 PROCEDURE — 94375 RESPIRATORY FLOW VOLUME LOOP: CPT | Performed by: INTERNAL MEDICINE

## 2024-10-24 PROCEDURE — 94729 DIFFUSING CAPACITY: CPT | Performed by: INTERNAL MEDICINE

## 2024-10-24 PROCEDURE — 99000 SPECIMEN HANDLING OFFICE-LAB: CPT | Performed by: PATHOLOGY

## 2024-10-24 PROCEDURE — 94726 PLETHYSMOGRAPHY LUNG VOLUMES: CPT | Performed by: INTERNAL MEDICINE

## 2024-10-24 PROCEDURE — 99215 OFFICE O/P EST HI 40 MIN: CPT | Mod: 25 | Performed by: INTERNAL MEDICINE

## 2024-10-24 PROCEDURE — 80193 DRUG ASSAY LEFLUNOMIDE: CPT | Mod: 90 | Performed by: PATHOLOGY

## 2024-10-24 PROCEDURE — 36415 COLL VENOUS BLD VENIPUNCTURE: CPT | Mod: 90 | Performed by: PATHOLOGY

## 2024-10-24 ASSESSMENT — PAIN SCALES - GENERAL: PAINLEVEL_OUTOF10: NO PAIN (0)

## 2024-10-24 NOTE — LETTER
10/24/2024      Saira Pyle  [address] [facility] [id number]      Dear Colleague,    Thank you for referring your patient, Saira Pyle, to the [facility] AND [facility] [place]. Please see a copy of my visit note below.    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 She is followed by Dr. Dewey in rheumatology.  She did not tolerate mycophenolate in the past due to GI side effects. She had an infusion reaction to rituximab in the past (tightness and burning in her throat). Her bronchiolitis is being managed with albuterol (started on 10/24/2023), montelukast, and Breo Ellipta inhaler. She also takes prednisone and leflunomide for her lupus. She had COVID-19 during Eliel time 2023 and took Paxlovid. She also takes famotidine and pantoprazole to manage GERD symptoms.    Today, Saira reports that she is doing well. Her lung symptoms feel stable, and she feels as if her dyspnea may be improved. She does not normally cough, except occasionally at night if GERD symptoms are worsened. She does have dry eyes and mouth. She denies any rashes. She exercises in the evening by riding a recumbent bike. She also recently went to [place], where she walked a lot with many hills and did not have issues. Her joint pain has not worsened, except her lower back. She states lower back spasms have prompted her to seek a nerve ablation, which she received for her upper back in the past and provided relief. She had 2 falls last year, with concussions, and has lingering headaches. She has not had any falls this year. Overall, she states her lung symptoms are stable, perhaps improved.  She continues to use albuterol inhaler as needed, Breo/Ellipta (100-25 MCG/ACT) 1x/day, prednisone 3mg/day, montelukast 10mg/day, and leflunomide 10mg/day.    Saira reports that Dr. Dewey is thinking about stopping the leflunomide and changing to a different DMARD.    ROS did not reveal any other pertinent positives.          Current Outpatient Medications   Medication Sig Dispense Refill     acyclovir (ZOVIRAX) 400 MG tablet Take 400 mg by mouth every morning        amLODIPine (NORVASC) 5 MG tablet Take 5 mg by mouth daily as needed For Raynauds Syndrome. Used seasonally when temperature is below 40 degrees.       Ascorbic Acid (VITAMIN C PO) Take 500 mg by mouth every morning        calcium carbonate-vitamin D 600-200 MG-UNIT TABS Take 1 tablet by mouth daily        estradiol (ESTRACE) 0.1 MG/GM vaginal cream Place 1 g vaginally once a week        estradiol (VAGIFEM) 10 MCG TABS vaginal tablet Place 10 mcg vaginally once a week (with Estrace cream.)       fluticasone-vilanterol (BREO ELLIPTA) 100-25 MCG/ACT inhaler Inhale 1 puff into the lungs daily 60 each 3     glucosamine-chondroitin 500-400 MG CAPS per capsule Take 1 capsule by mouth daily       hydroxypropyl methylcellulose (GENTEAL) 0.2 % SOLN ophthalmic solution Place 1 drop into both eyes At Bedtime        leflunomide (ARAVA) 10 MG tablet Take 10 mg by mouth every morning        melatonin 5 MG tablet Take 5 mg by mouth At Bedtime (Takes 2 x 5mg = 10mg)       methocarbamol (ROBAXIN) 500 MG tablet Take 750 mg by mouth.       montelukast (SINGULAIR) 10 MG tablet TAKE ONE TABLET BY MOUTH AT BEDTIME 90 tablet 0     NEUPRO 2 MG/24HR 24 hr patch Place 2 mg onto the skin       pantoprazole (PROTONIX) 40 MG EC tablet Take 40 mg by mouth daily       pimecrolimus (ELIDEL) 1 % cream Apply topically daily as needed        predniSONE (DELTASONE) 1 MG tablet Take 3 mg by mouth daily (with breakfast)        rosuvastatin (CRESTOR) 5 MG tablet Take 5 mg by mouth See  "Admin Instructions Takes 5 mg  for 2 consecutive days then 1 day off.       triamcinolone (KENALOG) 0.1 % external cream Apply topically 2 times daily as needed for irritation       Vitamin D, Cholecalciferol, 1000 units CAPS Take 1,000 Units by mouth 2 times daily        zinc gluconate 50 MG tablet Take 50 mg by mouth daily        albuterol (PROAIR HFA/PROVENTIL HFA/VENTOLIN HFA) 108 (90 Base) MCG/ACT inhaler Inhale 2 puffs into the lungs every 4 hours as needed for shortness of breath, wheezing or cough 18 g 1     gabapentin (NEURONTIN) 100 MG capsule Take 300 mg by mouth At Bedtime (Takes 3 x 100mg = 300mg)       LACTOBACILLUS ACID-PECTIN PO Take 1 tablet by mouth daily       pregabalin (LYRICA) 75 MG capsule Take 75 mg by mouth at bedtime       Turmeric 500 MG TABS Take 500 mg by mouth every morning       No current facility-administered medications for this visit.     Allergies   Allergen Reactions     Acetaminophen Anaphylaxis     Influenza Vaccines Anaphylaxis     Rituximab Hives and Itching     Other reaction(s): Breathing Difficulty     Cephalexin Hives     Amitriptyline      PN: LW Reaction: agitation, irritability     Azithromycin Other (See Comments)     \"systemic organ failure\"     Cephalosporins Hives     Cimetidine      PN: LW Reaction: GI Upset, vomiting     Codeine      PN: LW Reaction: Vomiting     Diazepam      hyperactive     Dronabinol Other (See Comments)     Other reaction(s): Headache  Nausea and vomiting     Fluoxetine Other (See Comments)     extreme agitation, cannot be combined for use with cipro     Metronidazole      Generalized illness     Metronidazole      Other reaction(s): Other - Describe In Comment Field  Extreme pain in legs     Miconazole      vaginal burning     Morphine Sulfate (Concentrate)      nausea and vomiting     Nitrofurantoin      Multiple organ failure     Nortriptyline Other (See Comments)     hyperactivity     Omeprazole      PN: LW Reaction: GI Upset     " Oxycodone Other (See Comments)     Sensitive to taking oxycodone      Polymyxin B      Thimerosal (Thiomersal) Other (See Comments)     Severe buring, lupus flare     Tioconazole Other (See Comments)     Vaginal burning and bleeding     Tramadol Other (See Comments) and Unknown     Other Reaction(s): too strong, amnesia    Too strong, amnesia     Erythromycin Rash     malaria  type reaction - fever and hair fell out     Iodine Rash     Needs to have Betadine washed off aftr surgery     Latex Rash     Other reaction(s): Other  redness     Levofloxacin Rash     PN: LW Reaction: Unknown Reaction     Quinolones Rash     happened with levaquin and Ciprofloxacin     Sulfa Antibiotics Rash     Tetracycline Rash     Past Medical History:   Diagnosis Date     Adverse effect of other specified systemic anti-infectives and antiparasitics, subsequent encounter      Anterior corneal dystrophy      Blepharitis      Bronchiolitis     small airways disease on chest CT 2017, probably related to SLE     Coronary artery disease      Cortical age-related cataract of both eyes      Disseminated retinitis and retinochoroiditis, pigment epitheliopathy      Diverticulitis of colon      GERD (gastroesophageal reflux disease)      Heart murmur     mitral valve disorder     Hypersomnia      ILD (interstitial lung disease) (H)     Mild ILD on chest CT 2017, probably related to SLE     Infection due to 2019 novel coronavirus     3/2022, did not require hospitalization; received monoclonal antibody     Keratopathy      MGD (meibomian gland dysfunction)      Osteopenia      Pericarditis     due to SLE, s/p pericardial window 2006     Plaquenil causing adverse effect in therapeutic use      Recurrent colitis due to Clostridium difficile      Reflux esophagitis      Restrictive lung disease      SLE (systemic lupus erythematosus related syndrome) (H)     dx in 20s; arthritis, serositis (pericarditis and pleuritis)     SLE (systemic lupus  erythematosus related syndrome) (H)      Tracheostomy in place (H)        Past Surgical History:   Procedure Laterality Date     ARTHROPLASTY KNEE Right 10/2/2018    Procedure: ARTHROPLASTY KNEE;  RIGHT TOTAL KNEE ARTHROPLASTY ;  Surgeon: Kelsie Hardin MD;  Location: [facility] OR     ARTHROPLASTY KNEE Left 10/1/2019    Procedure: EXAM UNDER ANESTHEISIA  AND LEFT TOTAL KNEE ARTHROPLASTY;  Surgeon: Kelsie Hardin MD;  Location: [facility] OR     CHOLECYSTECTOMY  04/2004     GYN SURGERY  04/2001    LEEP     GYN SURGERY  1985    removal of uterine fibroids     ORTHOPEDIC SURGERY Right     knee cartilage     ORTHOPEDIC SURGERY Left 2005    foot surgery     ORTHOPEDIC SURGERY Left     knee meniscus     ORTHOPEDIC SURGERY Left     thumb     ORTHOPEDIC SURGERY Right 2008    ulnar release     ORTHOPEDIC SURGERY Left 2017    ulnar release     THORACIC SURGERY      periocardiocentesis       Social History     Socioeconomic History     Marital status:      Spouse name: Not on file     Number of children: Not on file     Years of education: Not on file     Highest education level: Not on file   Occupational History     Not on file   Tobacco Use     Smoking status: Never     Smokeless tobacco: Never   Substance and Sexual Activity     Alcohol use: Not Currently     Drug use: Not Currently     Sexual activity: Not on file   Other Topics Concern     Parent/sibling w/ CABG, MI or angioplasty before 65F 55M? Not Asked   Social History Narrative    .  Had exposure to second hand tobacco smoke as a child.    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     Social Drivers of Health     Financial Resource Strain: Low Risk  (2/14/2024)    [facility], [facility]    Financial Resource Strain      Difficulty of Paying Living Expenses: 3      Difficulty of Paying Living Expenses: Not on file   Food Insecurity: No Food Insecurity (2/14/2024)    Received from [facility]    Food Insecurity      Do you worry your food will run out before you are able to buy more?: 1   Transportation Needs: No Transportation Needs (2/14/2024)    Received from [facility], [facility]    Transportation Needs      Lack of Transportation (Medical): 1   Physical Activity: Not on file   Stress: Not on file   Social Connections: Unknown (1/1/2022)    Received from [facility], [facility]    Social Connections      Frequency of Communication with Friends and Family: Not on file   Interpersonal Safety: Not on file   Housing Stability: Low Risk  (2/14/2024)    [facility]    Housing Stability      What is your housing situation today?: 1       Family History   Problem Relation Age of Onset     Sjogren's Mother      Sjogren's Sister          Vitals: /80   Pulse 84   Resp 18   Ht 1.524 m (5')   Wt 54 kg (119 lb)   SpO2 96%   BMI 23.24 kg/m      Exam:   GENERAL APPEARANCE: Well developed, well nourished, alert, and in no apparent distress.  RESP: Good air flow throughout.  + Bibasilar inspiratory crackles. No rhonchi. No wheezes.  No inspiratory squeaks.  CV: Normal S1, S2, regular rhythm, normal rate. No murmur.  No LE edema.   MS: +Bony changes in hand joints bilaterally, prominent in wrists, MCP, and PIP joints. No clubbing. No cyanosis.  SKIN: No rash on limited  exam.  NEURO: Mentation intact, speech normal.  PSYCH: Mentation appears normal. Normal/bright affect.      Results:  Recent Results (from the past week)   General PFT Lab (Please always keep checked)    Collection Time: 10/24/24 10:21 AM   Result Value Ref Range    FVC-Pred 2.25 L    FVC-Pre 1.67 L    FVC-%Pred-Pre 74 %    FEV1-Pre 1.47 L    FEV1-%Pred-Pre 83 %    FEV1FVC-Pred 79 %    FEV1FVC-Pre 88 %    FEFMax-Pred 4.95 L/sec    FEFMax-Pre 6.77 L/sec    FEFMax-%Pred-Pre 136 %    FEF2575-Pred 1.57 L/sec    FEF2575-Pre 2.32 L/sec    TMP4619-%Pred-Pre 147 %    ExpTime-Pre 5.36 sec    FIFMax-Pre 4.85 L/sec    VC-Pred 2.70 L    VC-Pre 1.70 L    VC-%Pred-Pre 63 %    IC-Pred 1.65 L    IC-Pre 1.50 L    IC-%Pred-Pre 90 %    ERV-Pred 0.83 L    ERV-Pre 0.20 L    ERV-%Pred-Pre 24 %    FEV1FEV6-Pred 79 %    FEV1FEV6-Pre 89 %    FRCPleth-Pred 2.51 L    FRCPleth-Pre 1.76 L    FRCPleth-%Pred-Pre 69 %    RVPleth-Pred 1.94 L    RVPleth-Pre 1.56 L    RVPleth-%Pred-Pre 80 %    TLCPleth-Pred 4.35 L    TLCPleth-Pre 3.26 L    TLCPleth-%Pred-Pre 74 %    DLCOunc-Pred 17.19 ml/min/mmHg    DLCOunc-Pre 11.12 ml/min/mmHg    DLCOunc-%Pred-Pre 64 %    VA-Pre 2.66 L    VA-%Pred-Pre 66 %    FEV1SVC-Pred 66 %    FEV1SVC-Pre 87 %       We reviewed the pulmonary function test that was performed today.  This shows mild restriction with a mild diffusion defect.  FVC has been trending down.    We reviewed results with the patient.    Cristi Dubose, Medical Student  [employer]          Assessment and plan:  Saira Pyle is a 74-year-old with mild ILD and bronchiolitis associated with lupus who is here for follow-up.   1.  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  Symptoms are stable, and we will continue montelukast 10 mg daily and Breo Ellipta inhaler.  Her prednisone dose currently is 3 mg daily, which is managed by Dr. Dewey in rheumatology.  She also continues on leflunomide, although  they are discussing whether to change to a different DMARD.  My recommendation is to continue prednisone at 3 mg daily and not taper further until we figure out whether Saira's ILD is worsening.  2.  ILD associated with lupus.  I am concerned because FVC has decreased from 82% in June 2023 to 74% today.  That is a significant drop.  I would like a repeat HRCT, and she prefers to do that at [facility] instead of coming back to our clinic next week.  If this shows worsening ILD, then I would definitely stop the leflunomide, although it would be unusual for it to worsen ILD this far out into the treatment course.  I will check a leflunomide metabolite (teriflunomide) blood level today.  If it is elevated, that could correlate with leflunomide lung toxicity.  I also would not taper the prednisone any further until we figure out whether Saira's ILD has worsened.  If the HRCT shows more inflammatory changes, then options would be increasing prednisone again and adding mycophenolate.  If the HRCT shows increased fibrotic changes, then I would consider nintedanib.  I would recommend stopping leflunomide if the HRCT shows worsening ILD.    If the HRCT shows no change compared to last year, then I would continue regular monitoring with a repeat PFT.  I did encourage Saira to continue exercise.  We will let her know the results of the HRCT.  We could set up a video visit to discuss treatment options if the HRCT shows new changes or worsening ILD.  Otherwise, she will return in 3 months with full PFT.  3.  Healthcare maintenance.  She recently received an updated COVID booster, as well as RSV, shingles, and pneumonia vaccines.  She does not get the flu vaccine because she got really sick from it in the past.  I saw and evaluated the patient with Cristi Dubose MD/PhD student, confirming key aspects of the history and exam. I personally reviewed the recent PFT and other labs. The above note reflects our detailed  discussion of the findings, assessment and plan, and I personally wrote the assessment and plan.  I reviewed today's PFT: Mild restriction with a mild diffusion defect.  FVC has trended down over the past year.  The longitudinal plan of care for the diagnosis(es)/condition(s) as documented were addressed during this visit. Due to the added complexity in care, I will continue to support Saira in the subsequent management and with ongoing continuity of care.  I spent 49 minutes reviewing chart, talking with and examining patient, reviewing test results, formulating plan and documentation on the day of the encounter (excluding PFT review).            Again, thank you for allowing me to participate in the care of your patient.        Sincerely,        Petra Valentin MD

## 2024-10-24 NOTE — NURSING NOTE
Chief Complaint   Patient presents with    Interstitial Lung Disease (ILD)     Follow up visit     Jase Green CMA CMA at 11:10 AM on 10/24/2024

## 2024-10-24 NOTE — PROGRESS NOTES
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 She is followed by Dr. Dewey in rheumatology.  She did not tolerate mycophenolate in the past due to GI side effects. She had an infusion reaction to rituximab in the past (tightness and burning in her throat). Her bronchiolitis is being managed with albuterol (started on 10/24/2023), montelukast, and Breo Ellipta inhaler. She also takes prednisone and leflunomide for her lupus. She had COVID-19 during Christmas time 2023 and took Paxlovid. She also takes famotidine and pantoprazole to manage GERD symptoms.    Today, Saira reports that she is doing well. Her lung symptoms feel stable, and she feels as if her dyspnea may be improved. She does not normally cough, except occasionally at night if GERD symptoms are worsened. She does have dry eyes and mouth. She denies any rashes. She exercises in the evening by riding a recumbent bike. She also recently went to [place], where she walked a lot with many hills and did not have issues. Her joint pain has not worsened, except her lower back. She states lower back spasms have prompted her to seek a nerve ablation, which she received for her upper back in the past and provided relief. She had 2 falls last year, with concussions, and has lingering headaches. She has not had any falls this year. Overall, she states her lung symptoms are stable, perhaps improved. She continues to use albuterol inhaler as needed, Breo/Ellipta (100-25 MCG/ACT) 1x/day, prednisone 3mg/day, montelukast 10mg/day, and leflunomide 10mg/day.    Saira reports that Dr. Dewey is thinking about stopping the leflunomide and changing to a different DMARD.    ROS did not reveal any  other pertinent positives.          Current Outpatient Medications   Medication Sig Dispense Refill    acyclovir (ZOVIRAX) 400 MG tablet Take 400 mg by mouth every morning       amLODIPine (NORVASC) 5 MG tablet Take 5 mg by mouth daily as needed For Raynauds Syndrome. Used seasonally when temperature is below 40 degrees.      Ascorbic Acid (VITAMIN C PO) Take 500 mg by mouth every morning       calcium carbonate-vitamin D 600-200 MG-UNIT TABS Take 1 tablet by mouth daily       estradiol (ESTRACE) 0.1 MG/GM vaginal cream Place 1 g vaginally once a week       estradiol (VAGIFEM) 10 MCG TABS vaginal tablet Place 10 mcg vaginally once a week (with Estrace cream.)      fluticasone-vilanterol (BREO ELLIPTA) 100-25 MCG/ACT inhaler Inhale 1 puff into the lungs daily 60 each 3    glucosamine-chondroitin 500-400 MG CAPS per capsule Take 1 capsule by mouth daily      hydroxypropyl methylcellulose (GENTEAL) 0.2 % SOLN ophthalmic solution Place 1 drop into both eyes At Bedtime       leflunomide (ARAVA) 10 MG tablet Take 10 mg by mouth every morning       melatonin 5 MG tablet Take 5 mg by mouth At Bedtime (Takes 2 x 5mg = 10mg)      methocarbamol (ROBAXIN) 500 MG tablet Take 750 mg by mouth.      montelukast (SINGULAIR) 10 MG tablet TAKE ONE TABLET BY MOUTH AT BEDTIME 90 tablet 0    NEUPRO 2 MG/24HR 24 hr patch Place 2 mg onto the skin      pantoprazole (PROTONIX) 40 MG EC tablet Take 40 mg by mouth daily      pimecrolimus (ELIDEL) 1 % cream Apply topically daily as needed       predniSONE (DELTASONE) 1 MG tablet Take 3 mg by mouth daily (with breakfast)       rosuvastatin (CRESTOR) 5 MG tablet Take 5 mg by mouth See Admin Instructions Takes 5 mg  for 2 consecutive days then 1 day off.      triamcinolone (KENALOG) 0.1 % external cream Apply topically 2 times daily as needed for irritation      Vitamin D, Cholecalciferol, 1000 units CAPS Take 1,000 Units by mouth 2 times daily       zinc gluconate 50 MG tablet Take 50 mg by mouth  "daily       albuterol (PROAIR HFA/PROVENTIL HFA/VENTOLIN HFA) 108 (90 Base) MCG/ACT inhaler Inhale 2 puffs into the lungs every 4 hours as needed for shortness of breath, wheezing or cough 18 g 1    gabapentin (NEURONTIN) 100 MG capsule Take 300 mg by mouth At Bedtime (Takes 3 x 100mg = 300mg)      LACTOBACILLUS ACID-PECTIN PO Take 1 tablet by mouth daily      pregabalin (LYRICA) 75 MG capsule Take 75 mg by mouth at bedtime      Turmeric 500 MG TABS Take 500 mg by mouth every morning       No current facility-administered medications for this visit.     Allergies   Allergen Reactions    Acetaminophen Anaphylaxis    Influenza Vaccines Anaphylaxis    Rituximab Hives and Itching     Other reaction(s): Breathing Difficulty    Cephalexin Hives    Amitriptyline      PN: LW Reaction: agitation, irritability    Azithromycin Other (See Comments)     \"systemic organ failure\"    Cephalosporins Hives    Cimetidine      PN: LW Reaction: GI Upset, vomiting    Codeine      PN: LW Reaction: Vomiting    Diazepam      hyperactive    Dronabinol Other (See Comments)     Other reaction(s): Headache  Nausea and vomiting    Fluoxetine Other (See Comments)     extreme agitation, cannot be combined for use with cipro    Metronidazole      Generalized illness    Metronidazole      Other reaction(s): Other - Describe In Comment Field  Extreme pain in legs    Miconazole      vaginal burning    Morphine Sulfate (Concentrate)      nausea and vomiting    Nitrofurantoin      Multiple organ failure    Nortriptyline Other (See Comments)     hyperactivity    Omeprazole      PN: LW Reaction: GI Upset    Oxycodone Other (See Comments)     Sensitive to taking oxycodone     Polymyxin B     Thimerosal (Thiomersal) Other (See Comments)     Severe buring, lupus flare    Tioconazole Other (See Comments)     Vaginal burning and bleeding    Tramadol Other (See Comments) and Unknown     Other Reaction(s): too strong, amnesia    Too strong, amnesia    " Erythromycin Rash     malaria  type reaction - fever and hair fell out    Iodine Rash     Needs to have Betadine washed off aftr surgery    Latex Rash     Other reaction(s): Other  redness    Levofloxacin Rash     PN: LW Reaction: Unknown Reaction    Quinolones Rash     happened with levaquin and Ciprofloxacin    Sulfa Antibiotics Rash    Tetracycline Rash     Past Medical History:   Diagnosis Date    Adverse effect of other specified systemic anti-infectives and antiparasitics, subsequent encounter     Anterior corneal dystrophy     Blepharitis     Bronchiolitis     small airways disease on chest CT 2017, probably related to SLE    Coronary artery disease     Cortical age-related cataract of both eyes     Disseminated retinitis and retinochoroiditis, pigment epitheliopathy     Diverticulitis of colon     GERD (gastroesophageal reflux disease)     Heart murmur     mitral valve disorder    Hypersomnia     ILD (interstitial lung disease) (H)     Mild ILD on chest CT 2017, probably related to SLE    Infection due to 2019 novel coronavirus     3/2022, did not require hospitalization; received monoclonal antibody    Keratopathy     MGD (meibomian gland dysfunction)     Osteopenia     Pericarditis     due to SLE, s/p pericardial window 2006    Plaquenil causing adverse effect in therapeutic use     Recurrent colitis due to Clostridium difficile     Reflux esophagitis     Restrictive lung disease     SLE (systemic lupus erythematosus related syndrome) (H)     dx in 20s; arthritis, serositis (pericarditis and pleuritis)    SLE (systemic lupus erythematosus related syndrome) (H)     Tracheostomy in place (H)        Past Surgical History:   Procedure Laterality Date    ARTHROPLASTY KNEE Right 10/2/2018    Procedure: ARTHROPLASTY KNEE;  RIGHT TOTAL KNEE ARTHROPLASTY ;  Surgeon: Kelsie Hardin MD;  Location:  OR    ARTHROPLASTY KNEE Left 10/1/2019    Procedure: EXAM UNDER ANESTHEISIA  AND LEFT TOTAL KNEE ARTHROPLASTY;   Surgeon: Kelsie Hardin MD;  Location: [facility] OR    CHOLECYSTECTOMY  04/2004    GYN SURGERY  04/2001    LEEP    GYN SURGERY  1985    removal of uterine fibroids    ORTHOPEDIC SURGERY Right     knee cartilage    ORTHOPEDIC SURGERY Left 2005    foot surgery    ORTHOPEDIC SURGERY Left     knee meniscus    ORTHOPEDIC SURGERY Left     thumb    ORTHOPEDIC SURGERY Right 2008    ulnar release    ORTHOPEDIC SURGERY Left 2017    ulnar release    THORACIC SURGERY      periocardiocentesis       Social History     Socioeconomic History    Marital status:      Spouse name: Not on file    Number of children: Not on file    Years of education: Not on file    Highest education level: Not on file   Occupational History    Not on file   Tobacco Use    Smoking status: Never    Smokeless tobacco: Never   Substance and Sexual Activity    Alcohol use: Not Currently    Drug use: Not Currently    Sexual activity: Not on file   Other Topics Concern    Parent/sibling w/ CABG, MI or angioplasty before 65F 55M? Not Asked   Social History Narrative    .  Had exposure to second hand tobacco smoke as a child.    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     Social Drivers of Health     Financial Resource Strain: Low Risk  (2/14/2024)    Received from [employer] [facility], [employer] & [employer] [facility]    Financial Resource Strain     Difficulty of Paying Living Expenses: 3     Difficulty of Paying Living Expenses: Not on file   Food Insecurity: No Food Insecurity (2/14/2024)    [facility] from  [facility]    Food Insecurity     Do you worry your food will run out before you are able to buy more?: 1   Transportation Needs: No Transportation Needs (2/14/2024)    Received from [facility] [facility], [facility]    Transportation Needs     Lack of Transportation (Medical): 1   Physical Activity: Not on file   Stress: Not on file   Social Connections: Unknown (1/1/2022)    [facility], [facility]    Social Connections     Frequency of Communication with Friends and Family: Not on file   Interpersonal Safety: Not on file   Housing Stability: Low Risk  (2/14/2024)    [facility]    Housing Stability     What is your housing situation today?: 1       Family History   Problem Relation Age of Onset    Sjogren's Mother     Sjogren's Sister          Vitals: /80   Pulse 84   Resp 18   Ht 1.524 m (5')   Wt 54 kg (119 lb)   SpO2 96%   BMI 23.24 kg/m      Exam:   GENERAL APPEARANCE: Well developed, well nourished, alert, and in no apparent distress.  RESP: Good air flow throughout.  + Bibasilar inspiratory crackles. No rhonchi. No wheezes.  No inspiratory squeaks.  CV: Normal S1, S2, regular rhythm, normal rate. No murmur.  No LE edema.   MS: +Bony changes in hand joints bilaterally, prominent in wrists, MCP, and PIP joints. No clubbing. No cyanosis.  SKIN: No rash on limited exam.  NEURO: Mentation intact, speech normal.  PSYCH: Mentation appears normal. Normal/bright affect.      Results:  Recent Results (from the past week)   General PFT Lab (Please always keep checked)    Collection Time: 10/24/24 10:21 AM   Result Value Ref Range    FVC-Pred 2.25 L    FVC-Pre 1.67 L    FVC-%Pred-Pre 74 %    FEV1-Pre 1.47 L    FEV1-%Pred-Pre 83 %    FEV1FVC-Pred 79 %    FEV1FVC-Pre 88 %    FEFMax-Pred 4.95  L/sec    FEFMax-Pre 6.77 L/sec    FEFMax-%Pred-Pre 136 %    FEF2575-Pred 1.57 L/sec    FEF2575-Pre 2.32 L/sec    YVM3218-%Pred-Pre 147 %    ExpTime-Pre 5.36 sec    FIFMax-Pre 4.85 L/sec    VC-Pred 2.70 L    VC-Pre 1.70 L    VC-%Pred-Pre 63 %    IC-Pred 1.65 L    IC-Pre 1.50 L    IC-%Pred-Pre 90 %    ERV-Pred 0.83 L    ERV-Pre 0.20 L    ERV-%Pred-Pre 24 %    FEV1FEV6-Pred 79 %    FEV1FEV6-Pre 89 %    FRCPleth-Pred 2.51 L    FRCPleth-Pre 1.76 L    FRCPleth-%Pred-Pre 69 %    RVPleth-Pred 1.94 L    RVPleth-Pre 1.56 L    RVPleth-%Pred-Pre 80 %    TLCPleth-Pred 4.35 L    TLCPleth-Pre 3.26 L    TLCPleth-%Pred-Pre 74 %    DLCOunc-Pred 17.19 ml/min/mmHg    DLCOunc-Pre 11.12 ml/min/mmHg    DLCOunc-%Pred-Pre 64 %    VA-Pre 2.66 L    VA-%Pred-Pre 66 %    FEV1SVC-Pred 66 %    FEV1SVC-Pre 87 %       We reviewed the pulmonary function test that was performed today.  This shows mild restriction with a mild diffusion defect.  FVC has been trending down.    We reviewed results with the patient.    Cristi Dubose, Medical Student  [employer]          Assessment and plan:  Saira Pyle is a 74-year-old with mild ILD and bronchiolitis associated with lupus who is here for follow-up.   1.  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  Symptoms are stable, and we will continue montelukast 10 mg daily and Breo Ellipta inhaler.  Her prednisone dose currently is 3 mg daily, which is managed by Dr. Dewey in rheumatology.  She also continues on leflunomide, although they are discussing whether to change to a different DMARD.  My recommendation is to continue prednisone at 3 mg daily and not taper further until we figure out whether Saira's ILD is worsening.  2.  ILD associated with lupus.  I am concerned because FVC has decreased from 82% in June 2023 to 74% today.  That is a significant drop.  I would like a repeat HRCT, and she prefers to do that at [facility] instead of coming  back to our clinic next week.  If this shows worsening ILD, then I would definitely stop the leflunomide, although it would be unusual for it to worsen ILD this far out into the treatment course.  I will check a leflunomide metabolite (teriflunomide) blood level today.  If it is elevated, that could correlate with leflunomide lung toxicity.  I also would not taper the prednisone any further until we figure out whether Saira's ILD has worsened.  If the HRCT shows more inflammatory changes, then options would be increasing prednisone again and adding mycophenolate.  If the HRCT shows increased fibrotic changes, then I would consider nintedanib.  I would recommend stopping leflunomide if the HRCT shows worsening ILD.    If the HRCT shows no change compared to last year, then I would continue regular monitoring with a repeat PFT.  I did encourage Saira to continue exercise.  We will let her know the results of the HRCT.  We could set up a video visit to discuss treatment options if the HRCT shows new changes or worsening ILD.  Otherwise, she will return in 3 months with full PFT.  3.  Healthcare maintenance.  She recently received an updated COVID booster, as well as RSV, shingles, and pneumonia vaccines.  She does not get the flu vaccine because she got really sick from it in the past.  I saw and evaluated the patient with Cristi Dubose MD/PhD student, confirming key aspects of the history and exam. I personally reviewed the recent PFT and other labs. The above note reflects our detailed discussion of the findings, assessment and plan, and I personally wrote the assessment and plan.  I reviewed today's PFT: Mild restriction with a mild diffusion defect.  FVC has trended down over the past year.  The longitudinal plan of care for the diagnosis(es)/condition(s) as documented were addressed during this visit. Due to the added complexity in care, I will continue to support Saira in the subsequent management and  with ongoing continuity of care.  I spent 49 minutes reviewing chart, talking with and examining patient, reviewing test results, formulating plan and documentation on the day of the encounter (excluding PFT review).

## 2024-10-25 LAB
DLCOUNC-%PRED-PRE: 64 %
DLCOUNC-PRE: 11.12 ML/MIN/MMHG
DLCOUNC-PRED: 17.19 ML/MIN/MMHG
ERV-%PRED-PRE: 24 %
ERV-PRE: 0.2 L
ERV-PRED: 0.83 L
EXPTIME-PRE: 5.36 SEC
FEF2575-%PRED-PRE: 147 %
FEF2575-PRE: 2.32 L/SEC
FEF2575-PRED: 1.57 L/SEC
FEFMAX-%PRED-PRE: 136 %
FEFMAX-PRE: 6.77 L/SEC
FEFMAX-PRED: 4.95 L/SEC
FEV1-%PRED-PRE: 83 %
FEV1-PRE: 1.47 L
FEV1FEV6-PRE: 89 %
FEV1FEV6-PRED: 79 %
FEV1FVC-PRE: 88 %
FEV1FVC-PRED: 79 %
FEV1SVC-PRE: 87 %
FEV1SVC-PRED: 66 %
FIFMAX-PRE: 4.85 L/SEC
FRCPLETH-%PRED-PRE: 69 %
FRCPLETH-PRE: 1.76 L
FRCPLETH-PRED: 2.51 L
FVC-%PRED-PRE: 74 %
FVC-PRE: 1.67 L
FVC-PRED: 2.25 L
IC-%PRED-PRE: 90 %
IC-PRE: 1.5 L
IC-PRED: 1.65 L
RVPLETH-%PRED-PRE: 80 %
RVPLETH-PRE: 1.56 L
RVPLETH-PRED: 1.94 L
TLCPLETH-%PRED-PRE: 74 %
TLCPLETH-PRE: 3.26 L
TLCPLETH-PRED: 4.35 L
VA-%PRED-PRE: 66 %
VA-PRE: 2.66 L
VC-%PRED-PRE: 63 %
VC-PRE: 1.7 L
VC-PRED: 2.7 L

## 2024-10-27 DIAGNOSIS — J21.9 BRONCHIOLITIS: ICD-10-CM

## 2024-10-28 LAB — TERIFLUNOMIDE SERPL-MCNC: 35.25 UG/ML

## 2024-10-28 RX ORDER — MONTELUKAST SODIUM 10 MG/1
1 TABLET ORAL
Qty: 90 TABLET | Refills: 0 | Status: SHIPPED | OUTPATIENT
Start: 2024-10-28

## 2024-10-30 ENCOUNTER — TELEPHONE (OUTPATIENT)
Dept: PULMONOLOGY | Facility: CLINIC | Age: 74
End: 2024-10-30
Payer: COMMERCIAL

## 2024-10-30 NOTE — TELEPHONE ENCOUNTER
Spoke to pt, explained Dr. Valentin message below. She states her rheumatologist thought Dr. Valentin might want to change the med. Pt expressed understanding/agreed to plan.     Erica RN, BSN  ILD Nurse   [phone]    ----- Message from Petra Valentin sent at 10/30/2024 11:08 AM CDT -----  Regarding: lab result  Please let her know that the leflunomide level is not high, which makes leflunomide lung toxicity a little less likely.  But, I would still recommend that she continue to talk with her rheumatologist about changing from leflunomide to another DMARD.    [facility]  ----- Message -----  From: Lab, Background User  Sent: 10/28/2024   3:02 PM CDT  To: Petra Valentin MD

## 2024-11-04 ENCOUNTER — TRANSFERRED RECORDS (OUTPATIENT)
Dept: HEALTH INFORMATION MANAGEMENT | Facility: CLINIC | Age: 74
End: 2024-11-04
Payer: COMMERCIAL

## 2024-11-17 DIAGNOSIS — J98.4 SMALL AIRWAYS DISEASE: ICD-10-CM

## 2024-11-17 DIAGNOSIS — J84.9 ILD (INTERSTITIAL LUNG DISEASE) (H): ICD-10-CM

## 2024-11-17 DIAGNOSIS — J21.9 BRONCHIOLITIS: ICD-10-CM

## 2024-11-18 RX ORDER — FLUTICASONE FUROATE AND VILANTEROL TRIFENATATE 100; 25 UG/1; UG/1
1 POWDER RESPIRATORY (INHALATION) DAILY
Qty: 60 EACH | Refills: 2 | Status: SHIPPED | OUTPATIENT
Start: 2024-11-18

## 2025-02-01 DIAGNOSIS — J21.9 BRONCHIOLITIS: ICD-10-CM

## 2025-02-03 RX ORDER — MONTELUKAST SODIUM 10 MG/1
1 TABLET ORAL
Qty: 90 TABLET | Refills: 0 | Status: SHIPPED | OUTPATIENT
Start: 2025-02-03

## 2025-02-19 ENCOUNTER — TRANSFERRED RECORDS (OUTPATIENT)
Dept: HEALTH INFORMATION MANAGEMENT | Facility: CLINIC | Age: 75
End: 2025-02-19
Payer: COMMERCIAL

## 2025-03-19 DIAGNOSIS — J21.9 BRONCHIOLITIS: ICD-10-CM

## 2025-03-19 DIAGNOSIS — J98.4 SMALL AIRWAYS DISEASE: ICD-10-CM

## 2025-03-19 DIAGNOSIS — J84.9 ILD (INTERSTITIAL LUNG DISEASE) (H): ICD-10-CM

## 2025-03-19 RX ORDER — FLUTICASONE FUROATE AND VILANTEROL TRIFENATATE 100; 25 UG/1; UG/1
1 POWDER RESPIRATORY (INHALATION) DAILY
Qty: 60 EACH | Refills: 0 | Status: SHIPPED | OUTPATIENT
Start: 2025-03-19

## 2025-04-01 ENCOUNTER — TRANSFERRED RECORDS (OUTPATIENT)
Dept: HEALTH INFORMATION MANAGEMENT | Facility: CLINIC | Age: 75
End: 2025-04-01
Payer: COMMERCIAL

## 2025-04-25 DIAGNOSIS — J21.9 BRONCHIOLITIS: ICD-10-CM

## 2025-04-25 DIAGNOSIS — J84.9 ILD (INTERSTITIAL LUNG DISEASE) (H): ICD-10-CM

## 2025-04-25 DIAGNOSIS — J98.4 SMALL AIRWAYS DISEASE: ICD-10-CM

## 2025-04-28 RX ORDER — FLUTICASONE FUROATE AND VILANTEROL TRIFENATATE 100; 25 UG/1; UG/1
1 POWDER RESPIRATORY (INHALATION) DAILY
Qty: 60 EACH | Refills: 0 | Status: SHIPPED | OUTPATIENT
Start: 2025-04-28

## 2025-05-04 DIAGNOSIS — J21.9 BRONCHIOLITIS: ICD-10-CM

## 2025-05-05 RX ORDER — MONTELUKAST SODIUM 10 MG/1
1 TABLET ORAL
Qty: 90 TABLET | Refills: 0 | Status: SHIPPED | OUTPATIENT
Start: 2025-05-05

## 2025-05-24 ENCOUNTER — HEALTH MAINTENANCE LETTER (OUTPATIENT)
Age: 75
End: 2025-05-24

## 2025-06-05 DIAGNOSIS — J21.9 BRONCHIOLITIS: ICD-10-CM

## 2025-06-05 DIAGNOSIS — J84.9 ILD (INTERSTITIAL LUNG DISEASE) (H): ICD-10-CM

## 2025-06-05 DIAGNOSIS — J98.4 SMALL AIRWAYS DISEASE: ICD-10-CM

## 2025-06-05 RX ORDER — FLUTICASONE FUROATE AND VILANTEROL TRIFENATATE 100; 25 UG/1; UG/1
1 POWDER RESPIRATORY (INHALATION) DAILY
Qty: 60 EACH | Refills: 1 | Status: SHIPPED | OUTPATIENT
Start: 2025-06-05

## 2025-06-10 ENCOUNTER — TRANSFERRED RECORDS (OUTPATIENT)
Dept: HEALTH INFORMATION MANAGEMENT | Facility: CLINIC | Age: 75
End: 2025-06-10
Payer: COMMERCIAL

## 2025-07-03 ENCOUNTER — OFFICE VISIT (OUTPATIENT)
Dept: PULMONOLOGY | Facility: CLINIC | Age: 75
End: 2025-07-03
Attending: INTERNAL MEDICINE
Payer: COMMERCIAL

## 2025-07-03 VITALS
SYSTOLIC BLOOD PRESSURE: 114 MMHG | BODY MASS INDEX: 22.28 KG/M2 | OXYGEN SATURATION: 95 % | WEIGHT: 118 LBS | HEART RATE: 83 BPM | DIASTOLIC BLOOD PRESSURE: 71 MMHG | HEIGHT: 61 IN

## 2025-07-03 DIAGNOSIS — J84.9 ILD (INTERSTITIAL LUNG DISEASE) (H): ICD-10-CM

## 2025-07-03 DIAGNOSIS — J84.9 ILD (INTERSTITIAL LUNG DISEASE) (H): Primary | ICD-10-CM

## 2025-07-03 LAB
DLCOUNC-%PRED-PRE: 61 %
DLCOUNC-PRE: 10.46 ML/MIN/MMHG
DLCOUNC-PRED: 17.15 ML/MIN/MMHG
ERV-%PRED-PRE: 40 %
ERV-PRE: 0.33 L
ERV-PRED: 0.82 L
EXPTIME-PRE: 3.84 SEC
FEF2575-%PRED-PRE: 139 %
FEF2575-PRE: 2.17 L/SEC
FEF2575-PRED: 1.55 L/SEC
FEFMAX-%PRED-PRE: 131 %
FEFMAX-PRE: 6.46 L/SEC
FEFMAX-PRED: 4.9 L/SEC
FEV1-%PRED-PRE: 83 %
FEV1-PRE: 1.48 L
FEV1FEV6-PRE: 87 %
FEV1FEV6-PRED: 79 %
FEV1FVC-PRE: 87 %
FEV1FVC-PRED: 79 %
FEV1SVC-PRE: 84 %
FEV1SVC-PRED: 65 %
FIFMAX-PRE: 4.98 L/SEC
FRCPLETH-%PRED-PRE: 60 %
FRCPLETH-PRE: 1.55 L
FRCPLETH-PRED: 2.58 L
FVC-%PRED-PRE: 76 %
FVC-PRE: 1.7 L
FVC-PRED: 2.23 L
IC-%PRED-PRE: 87 %
IC-PRE: 1.42 L
IC-PRED: 1.63 L
RVPLETH-%PRED-PRE: 62 %
RVPLETH-PRE: 1.22 L
RVPLETH-PRED: 1.95 L
TLCPLETH-%PRED-PRE: 68 %
TLCPLETH-PRE: 2.97 L
TLCPLETH-PRED: 4.35 L
VA-%PRED-PRE: 65 %
VA-PRE: 2.6 L
VC-%PRED-PRE: 65 %
VC-PRE: 1.75 L
VC-PRED: 2.69 L

## 2025-07-03 PROCEDURE — 99212 OFFICE O/P EST SF 10 MIN: CPT | Performed by: INTERNAL MEDICINE

## 2025-07-03 RX ORDER — BELIMUMAB 200 MG/ML
200 SOLUTION SUBCUTANEOUS WEEKLY
COMMUNITY
Start: 2025-06-20

## 2025-07-03 ASSESSMENT — PAIN SCALES - GENERAL: PAINLEVEL_OUTOF10: MODERATE PAIN (4)

## 2025-07-03 NOTE — PROGRESS NOTES
"[facility]    Reason for Visit  Saira Pyle is a 74 year old year old female who is being seen for No chief complaint on file.    HPI  Saira Pyle is a 74 year old with ILD and bronchiolitis associated with lupus who is here for follow-up. She is followed by Dr. Dewey in rheumatology.  She did not tolerate mycophenolate in the past due to GI side effects. She had an infusion reaction to rituximab in the past (tightness and burning in her throat). Her bronchiolitis is being managed with albuterol (started on 10/24/2023), montelukast, and Breo Ellipta inhaler. She also takes prednisone and leflunomide and belimumab (Benlysta) for her lupus.  She also takes famotidine and pantoprazole to manage GERD symptoms.     Today, Saira reports that her dyspnea on exertion is stable.  For example, she feels \"a little bit\" short of breath when she walks uphill or when she walks fast.  She denies paroxysmal nocturnal dyspnea.  She occasionally coughs at nighttime but not during the day.  She does still sometimes get heartburn symptoms at nighttime despite famotidine and pantoprazole.  She denies new skin rashes or wheezing.  She does try to walk a little bit every day.    Her current prednisone dose is 3 mg daily, and she continues to take her other medications.  She had foot surgery about a month ago.          Current Outpatient Medications   Medication Sig Dispense Refill    acyclovir (ZOVIRAX) 400 MG tablet Take 400 mg by mouth every morning       amLODIPine (NORVASC) 5 MG tablet Take 5 mg by mouth daily as needed For Raynauds Syndrome. Used seasonally when temperature is below 40 degrees.      Ascorbic Acid (VITAMIN C PO) Take 500 mg by mouth every morning       BENLYSTA subcutaneous injection (prefilled autoinjector) Inject 200 mg subcutaneously once a week.      calcium carbonate-vitamin D 600-200 MG-UNIT TABS Take 1 tablet by mouth daily       estradiol " (ESTRACE) 0.1 MG/GM vaginal cream Place 1 g vaginally once a week       estradiol (VAGIFEM) 10 MCG TABS vaginal tablet Place 10 mcg vaginally once a week (with Estrace cream.)      fluticasone-vilanterol (BREO ELLIPTA) 100-25 MCG/ACT inhaler Inhale 1 puff into the lungs daily 60 each 1    glucosamine-chondroitin 500-400 MG CAPS per capsule Take 1 capsule by mouth daily      hydroxypropyl methylcellulose (GENTEAL) 0.2 % SOLN ophthalmic solution Place 1 drop into both eyes At Bedtime       melatonin 5 MG tablet Take 5 mg by mouth At Bedtime (Takes 2 x 5mg = 10mg)      montelukast (SINGULAIR) 10 MG tablet TAKE ONE TABLET BY MOUTH AT BEDTIME 90 tablet 0    pantoprazole (PROTONIX) 40 MG EC tablet Take 40 mg by mouth daily      pimecrolimus (ELIDEL) 1 % cream Apply topically daily as needed       predniSONE (DELTASONE) 1 MG tablet Take 3 mg by mouth daily (with breakfast)       rosuvastatin (CRESTOR) 5 MG tablet Take 5 mg by mouth See Admin Instructions Takes 5 mg  for 2 consecutive days then 1 day off.      triamcinolone (KENALOG) 0.1 % external cream Apply topically 2 times daily as needed for irritation      Vitamin D, Cholecalciferol, 1000 units CAPS Take 1,000 Units by mouth 2 times daily       zinc gluconate 50 MG tablet Take 50 mg by mouth daily       gabapentin (NEURONTIN) 100 MG capsule Take 300 mg by mouth At Bedtime (Takes 3 x 100mg = 300mg)      leflunomide (ARAVA) 10 MG tablet Take 10 mg by mouth every morning       methocarbamol (ROBAXIN) 500 MG tablet Take 750 mg by mouth.      NEUPRO 2 MG/24HR 24 hr patch Place 2 mg onto the skin      pregabalin (LYRICA) 75 MG capsule Take 75 mg by mouth at bedtime       No current facility-administered medications for this visit.     Allergies   Allergen Reactions    Acetaminophen Anaphylaxis    Influenza Vaccines Anaphylaxis    Rituximab Hives and Itching     Other reaction(s): Breathing Difficulty    Cephalexin Hives    Amitriptyline      PN: LW Reaction: agitation,  "irritability    Azithromycin Other (See Comments)     \"systemic organ failure\"    Cephalosporins Hives    Cimetidine      PN: LW Reaction: GI Upset, vomiting    Codeine      PN: LW Reaction: Vomiting    Diazepam      hyperactive    Dronabinol Other (See Comments)     Other reaction(s): Headache  Nausea and vomiting    Fluoxetine Other (See Comments)     extreme agitation, cannot be combined for use with cipro    Metronidazole      Generalized illness    Metronidazole      Other reaction(s): Other - Describe In Comment Field  Extreme pain in legs    Miconazole      vaginal burning    Morphine Sulfate (Concentrate)      nausea and vomiting    Nitrofurantoin      Multiple organ failure    Nortriptyline Other (See Comments)     hyperactivity    Omeprazole      PN: LW Reaction: GI Upset    Oxycodone Other (See Comments)     Sensitive to taking oxycodone     Polymyxin B     Thimerosal (Thiomersal) Other (See Comments)     Severe buring, lupus flare    Tioconazole Other (See Comments)     Vaginal burning and bleeding    Tramadol Other (See Comments) and Unknown     Other Reaction(s): too strong, amnesia    Too strong, amnesia    Erythromycin Rash     malaria  type reaction - fever and hair fell out    Iodine Rash     Needs to have Betadine washed off aftr surgery    Latex Rash     Other reaction(s): Other  redness    Levofloxacin Rash     PN: LW Reaction: Unknown Reaction    Quinolones Rash     happened with levaquin and Ciprofloxacin    Sulfa Antibiotics Rash    Tetracycline Rash     Past Medical History:   Diagnosis Date    Adverse effect of other specified systemic anti-infectives and antiparasitics, subsequent encounter     Anterior corneal dystrophy     Blepharitis     Bronchiolitis     small airways disease on chest CT 2017, probably related to SLE    Coronary artery disease     Cortical age-related cataract of both eyes     Disseminated retinitis and retinochoroiditis, pigment epitheliopathy     Diverticulitis of " colon     GERD (gastroesophageal reflux disease)     Heart murmur     mitral valve disorder    Hypersomnia     ILD (interstitial lung disease) (H)     Mild ILD on chest CT 2017, probably related to SLE    Infection due to 2019 novel coronavirus     3/2022, did not require hospitalization; received monoclonal antibody    Keratopathy     MGD (meibomian gland dysfunction)     Osteopenia     Pericarditis     due to SLE, s/p pericardial window 2006    Plaquenil causing adverse effect in therapeutic use     Recurrent colitis due to Clostridium difficile     Reflux esophagitis     Restrictive lung disease     SLE (systemic lupus erythematosus related syndrome) (H)     dx in 20s; arthritis, serositis (pericarditis and pleuritis)    SLE (systemic lupus erythematosus related syndrome) (H)     Tracheostomy in place (H)        Past Surgical History:   Procedure Laterality Date    ARTHROPLASTY KNEE Right 10/2/2018    Procedure: ARTHROPLASTY KNEE;  RIGHT TOTAL KNEE ARTHROPLASTY ;  Surgeon: Kelsie Hardin MD;  Location:  OR    ARTHROPLASTY KNEE Left 10/1/2019    Procedure: EXAM UNDER ANESTHEISIA  AND LEFT TOTAL KNEE ARTHROPLASTY;  Surgeon: Kelsie Hardin MD;  Location:  OR    CHOLECYSTECTOMY  04/2004    GYN SURGERY  04/2001    LEEP    GYN SURGERY  1985    removal of uterine fibroids    ORTHOPEDIC SURGERY Right     knee cartilage    ORTHOPEDIC SURGERY Left 2005    foot surgery    ORTHOPEDIC SURGERY Left     knee meniscus    ORTHOPEDIC SURGERY Left     thumb    ORTHOPEDIC SURGERY Right 2008    ulnar release    ORTHOPEDIC SURGERY Left 2017    ulnar release    THORACIC SURGERY      periocardiocentesis       Social History     Socioeconomic History    Marital status:      Spouse name: Not on file    Number of children: Not on file    Years of education: Not on file    Highest education level: Not on file   Occupational History    Not on file   Tobacco Use    Smoking status: Never    Smokeless tobacco: Never    Substance and Sexual Activity    Alcohol use: Not Currently    Drug use: Not Currently    Sexual activity: Not on file   Other Topics Concern    Parent/sibling w/ CABG, MI or angioplasty before 65F 55M? Not Asked   Social History Narrative    .  Had exposure to second hand tobacco smoke as a child.    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     Social Drivers of Health     Financial Resource Strain: Low Risk  (2/14/2024)    [facility]    Financial Resource Strain     Difficulty of Paying Living Expenses: 3     Difficulty of Paying Living Expenses: Not on file   Food Insecurity: No Food Insecurity (2/14/2024)    [facility]    Food Insecurity     Do you worry your food will run out before you are able to buy more?: 1   Transportation Needs: No Transportation Needs (2/14/2024)    [facility]    Transportation Needs     Does lack of transportation keep you from medical appointments?: 1     Does lack of transportation keep you from work, meetings or getting things that you need?: 1   Physical Activity: Not on file   Stress: Not on file   Social Connections: Unknown (1/1/2022)    [facility]    Social Connections     Frequency of Communication with Friends and Family: Not on file   Interpersonal Safety: Not on file   Housing Stability: Low Risk  (2/14/2024)    [facility]     "Housing Stability     What is your housing situation today?: 1       Family History   Problem Relation Age of Onset    Sjogren's Mother     Sjogren's Sister            Vitals: /71 (BP Location: Right arm, Patient Position: Sitting, Cuff Size: Adult Regular)   Pulse 83   Ht 1.537 m (5' 0.5\")   Wt 53.5 kg (118 lb)   SpO2 95%   BMI 22.67 kg/m      Exam:   GENERAL APPEARANCE: Well developed, well nourished, alert, and in no apparent distress.  RESP: Good air flow throughout.  + Bibasilar inspiratory crackles. No rhonchi. No wheezes.  No inspiratory squeaks.  CV: Normal S1, S2, regular rhythm, normal rate. No murmur.  No LE edema.   MS: +Bony changes in hand joints bilaterally. No clubbing. No cyanosis.  SKIN: No rash on limited exam.  NEURO: Mentation intact, speech normal.  PSYCH: Mentation appears normal. Normal/bright affect.      Results:  Recent Results (from the past week)   General PFT Lab (Please always keep checked)    Collection Time: 07/03/25  9:28 AM   Result Value Ref Range    FVC-Pred 2.23 L    FVC-Pre 1.70 L    FVC-%Pred-Pre 76 %    FEV1-Pre 1.48 L    FEV1-%Pred-Pre 83 %    FEV1FVC-Pred 79 %    FEV1FVC-Pre 87 %    FEFMax-Pred 4.90 L/sec    FEFMax-Pre 6.46 L/sec    FEFMax-%Pred-Pre 131 %    FEF2575-Pred 1.55 L/sec    FEF2575-Pre 2.17 L/sec    IVB8477-%Pred-Pre 139 %    ExpTime-Pre 3.84 sec    FIFMax-Pre 4.98 L/sec    VC-Pred 2.69 L    VC-Pre 1.75 L    VC-%Pred-Pre 65 %    IC-Pred 1.63 L    IC-Pre 1.42 L    IC-%Pred-Pre 87 %    ERV-Pred 0.82 L    ERV-Pre 0.33 L    ERV-%Pred-Pre 40 %    FEV1FEV6-Pred 79 %    FEV1FEV6-Pre 87 %    FRCPleth-Pred 2.58 L    FRCPleth-Pre 1.55 L    FRCPleth-%Pred-Pre 60 %    RVPleth-Pred 1.95 L    RVPleth-Pre 1.22 L    RVPleth-%Pred-Pre 62 %    TLCPleth-Pred 4.35 L    TLCPleth-Pre 2.97 L    TLCPleth-%Pred-Pre 68 %    DLCOunc-Pred 17.15 ml/min/mmHg    DLCOunc-Pre 10.46 ml/min/mmHg    DLCOunc-%Pred-Pre 61 %    VA-Pre 2.60 L    VA-%Pred-Pre 65 %    FEV1SVC-Pred 65 %    " FEV1SVC-Pre 84 %       I reviewed pulmonary function test that was performed today.  This shows mild restriction with a moderate diffusion defect.  Lung function remains stable although down from her baseline in 2022..    She had an outside chest CT in November 2024, but we do not have the images.  The outside report states that the ILD looks similar compared to August 2023.    The leflunomide metabolite level in October 2024 was not elevated.    I reviewed results with the patient.          Assessment and plan:  Saira Pyle is a 74 year old with ILD and bronchiolitis associated with lupus who is here for follow-up.  1.  ILD associated with lupus.  Although the PFT is stable, it is down from her prior baseline in 2023.  Symptomatically, her dyspnea on exertion appears stable.  She had an outside chest CT scan last fall, and we will get the images so that we can review with Dr. Crespo.  She is currently on leflunomide and belimumab for her lupus.  Whether these are treating her ILD is unclear.  Reviewing her chest CT will give me a better idea of whether she needs to start an antifibrotic.  Leflunomide metabolite level was not elevated when I checked it last fall.  We will contact her after we review outside chest CT scan.  She will return in 4 months with full PFT.  2.  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  Dyspnea on exertion remains stable.  She will continue montelukast 10 mg daily and Breo Ellipta inhaler.  Her prednisone dose remains at 3 mg daily, and this is managed by Dr. Dewey in rheumatology.  I would not taper her prednisone dose any further at this time.    The longitudinal plan of care for the diagnosis(es)/condition(s) as documented were addressed during this visit. Due to the added complexity in care, I will continue to support Saira in the subsequent management and with ongoing continuity of care.  I spent 47 minutes reviewing chart, reviewing test results, talking  with and examining patient, formulating plan, and documentation on the day of the encounter.

## 2025-07-03 NOTE — LETTER
"7/3/2025      Saira Pyle  [address] [facility] [address]      Dear Colleague,    Thank you for referring your patient, Saira Pyle, to the [facility] AND [facility]. Please see a copy of my visit note below.    [facility]    Reason for Visit  Saira Pyle is a 74 year old year old female who is being seen for No chief complaint on file.    HPI  Saira Pyle is a 74 year old with ILD and bronchiolitis associated with lupus who is here for follow-up. She is followed by Dr. Dewey in rheumatology.  She did not tolerate mycophenolate in the past due to GI side effects. She had an infusion reaction to rituximab in the past (tightness and burning in her throat). Her bronchiolitis is being managed with albuterol (started on 10/24/2023), montelukast, and Breo Ellipta inhaler. She also takes prednisone and leflunomide and belimumab (Benlysta) for her lupus.  She also takes famotidine and pantoprazole to manage GERD symptoms.     Today, Saira reports that her dyspnea on exertion is stable.  For example, she feels \"a little bit\" short of breath when she walks uphill or when she walks fast.  She denies paroxysmal nocturnal dyspnea.  She occasionally coughs at nighttime but not during the day.  She does still sometimes get heartburn symptoms at nighttime despite famotidine and pantoprazole.  She denies new skin rashes or wheezing.  She does try to walk a little bit every day.    Her current prednisone dose is 3 mg daily, and she continues to take her other medications.  She had foot surgery about a month ago.          Current Outpatient Medications   Medication Sig Dispense Refill     acyclovir (ZOVIRAX) 400 MG tablet Take 400 mg by mouth every morning        amLODIPine (NORVASC) 5 MG tablet Take 5 mg by mouth daily as needed For Raynauds Syndrome. Used seasonally when temperature " is below 40 degrees.       Ascorbic Acid (VITAMIN C PO) Take 500 mg by mouth every morning        BENLYSTA subcutaneous injection (prefilled autoinjector) Inject 200 mg subcutaneously once a week.       calcium carbonate-vitamin D 600-200 MG-UNIT TABS Take 1 tablet by mouth daily        estradiol (ESTRACE) 0.1 MG/GM vaginal cream Place 1 g vaginally once a week        estradiol (VAGIFEM) 10 MCG TABS vaginal tablet Place 10 mcg vaginally once a week (with Estrace cream.)       fluticasone-vilanterol (BREO ELLIPTA) 100-25 MCG/ACT inhaler Inhale 1 puff into the lungs daily 60 each 1     glucosamine-chondroitin 500-400 MG CAPS per capsule Take 1 capsule by mouth daily       hydroxypropyl methylcellulose (GENTEAL) 0.2 % SOLN ophthalmic solution Place 1 drop into both eyes At Bedtime        melatonin 5 MG tablet Take 5 mg by mouth At Bedtime (Takes 2 x 5mg = 10mg)       montelukast (SINGULAIR) 10 MG tablet TAKE ONE TABLET BY MOUTH AT BEDTIME 90 tablet 0     pantoprazole (PROTONIX) 40 MG EC tablet Take 40 mg by mouth daily       pimecrolimus (ELIDEL) 1 % cream Apply topically daily as needed        predniSONE (DELTASONE) 1 MG tablet Take 3 mg by mouth daily (with breakfast)        rosuvastatin (CRESTOR) 5 MG tablet Take 5 mg by mouth See Admin Instructions Takes 5 mg  for 2 consecutive days then 1 day off.       triamcinolone (KENALOG) 0.1 % external cream Apply topically 2 times daily as needed for irritation       Vitamin D, Cholecalciferol, 1000 units CAPS Take 1,000 Units by mouth 2 times daily        zinc gluconate 50 MG tablet Take 50 mg by mouth daily        gabapentin (NEURONTIN) 100 MG capsule Take 300 mg by mouth At Bedtime (Takes 3 x 100mg = 300mg)       leflunomide (ARAVA) 10 MG tablet Take 10 mg by mouth every morning        methocarbamol (ROBAXIN) 500 MG tablet Take 750 mg by mouth.       NEUPRO 2 MG/24HR 24 hr patch Place 2 mg onto the skin       pregabalin (LYRICA) 75 MG capsule Take 75 mg by mouth at  "bedtime       No current facility-administered medications for this visit.     Allergies   Allergen Reactions     Acetaminophen Anaphylaxis     Influenza Vaccines Anaphylaxis     Rituximab Hives and Itching     Other reaction(s): Breathing Difficulty     Cephalexin Hives     Amitriptyline      PN: LW Reaction: agitation, irritability     Azithromycin Other (See Comments)     \"systemic organ failure\"     Cephalosporins Hives     Cimetidine      PN: LW Reaction: GI Upset, vomiting     Codeine      PN: LW Reaction: Vomiting     Diazepam      hyperactive     Dronabinol Other (See Comments)     Other reaction(s): Headache  Nausea and vomiting     Fluoxetine Other (See Comments)     extreme agitation, cannot be combined for use with cipro     Metronidazole      Generalized illness     Metronidazole      Other reaction(s): Other - Describe In Comment Field  Extreme pain in legs     Miconazole      vaginal burning     Morphine Sulfate (Concentrate)      nausea and vomiting     Nitrofurantoin      Multiple organ failure     Nortriptyline Other (See Comments)     hyperactivity     Omeprazole      PN: LW Reaction: GI Upset     Oxycodone Other (See Comments)     Sensitive to taking oxycodone      Polymyxin B      Thimerosal (Thiomersal) Other (See Comments)     Severe buring, lupus flare     Tioconazole Other (See Comments)     Vaginal burning and bleeding     Tramadol Other (See Comments) and Unknown     Other Reaction(s): too strong, amnesia    Too strong, amnesia     Erythromycin Rash     malaria  type reaction - fever and hair fell out     Iodine Rash     Needs to have Betadine washed off aftr surgery     Latex Rash     Other reaction(s): Other  redness     Levofloxacin Rash     PN: LW Reaction: Unknown Reaction     Quinolones Rash     happened with levaquin and Ciprofloxacin     Sulfa Antibiotics Rash     Tetracycline Rash     Past Medical History:   Diagnosis Date     Adverse effect of other specified systemic " anti-infectives and antiparasitics, subsequent encounter      Anterior corneal dystrophy      Blepharitis      Bronchiolitis     small airways disease on chest CT 2017, probably related to SLE     Coronary artery disease      Cortical age-related cataract of both eyes      Disseminated retinitis and retinochoroiditis, pigment epitheliopathy      Diverticulitis of colon      GERD (gastroesophageal reflux disease)      Heart murmur     mitral valve disorder     Hypersomnia      ILD (interstitial lung disease) (H)     Mild ILD on chest CT 2017, probably related to SLE     Infection due to 2019 novel coronavirus     3/2022, did not require hospitalization; received monoclonal antibody     Keratopathy      MGD (meibomian gland dysfunction)      Osteopenia      Pericarditis     due to SLE, s/p pericardial window 2006     Plaquenil causing adverse effect in therapeutic use      Recurrent colitis due to Clostridium difficile      Reflux esophagitis      Restrictive lung disease      SLE (systemic lupus erythematosus related syndrome) (H)     dx in 20s; arthritis, serositis (pericarditis and pleuritis)     SLE (systemic lupus erythematosus related syndrome) (H)      Tracheostomy in place (H)        Past Surgical History:   Procedure Laterality Date     ARTHROPLASTY KNEE Right 10/2/2018    Procedure: ARTHROPLASTY KNEE;  RIGHT TOTAL KNEE ARTHROPLASTY ;  Surgeon: Kelsie Hardin MD;  Location:  OR     ARTHROPLASTY KNEE Left 10/1/2019    Procedure: EXAM UNDER ANESTHEISIA  AND LEFT TOTAL KNEE ARTHROPLASTY;  Surgeon: Kelsie Hardin MD;  Location:  OR     CHOLECYSTECTOMY  04/2004     GYN SURGERY  04/2001    [facility]     GYN SURGERY  1985    removal of uterine fibroids     ORTHOPEDIC SURGERY Right     knee cartilage     ORTHOPEDIC SURGERY Left 2005    foot surgery     ORTHOPEDIC SURGERY Left     knee meniscus     ORTHOPEDIC SURGERY Left     thumb     ORTHOPEDIC SURGERY Right 2008    ulnar release     ORTHOPEDIC SURGERY Left  2017    ulnar release     THORACIC SURGERY      periocardiocentesis       Social History     Socioeconomic History     Marital status:      Spouse name: Not on file     Number of children: Not on file     Years of education: Not on file     Highest education level: Not on file   Occupational History     Not on file   Tobacco Use     Smoking status: Never     Smokeless tobacco: Never   Substance and Sexual Activity     Alcohol use: Not Currently     Drug use: Not Currently     Sexual activity: Not on file   Other Topics Concern     Parent/sibling w/ CABG, MI or angioplasty before 65F 55M? Not Asked   Social History Narrative    .  Had exposure to second hand tobacco smoke as a child.    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     Social Drivers of Health     Financial Resource Strain: Low Risk  (2/14/2024)    Received from [facility]    Financial Resource Strain      Difficulty of Paying Living Expenses: 3      Difficulty of Paying Living Expenses: Not on file   Food Insecurity: No Food Insecurity (2/14/2024)    Received from [facility]    Food Insecurity      Do you worry your food will run out before you are able to buy more?: 1   Transportation Needs: No Transportation Needs (2/14/2024)    Received from [facility]    Transportation Needs      Does lack of transportation keep you from medical appointments?: 1      Does lack of transportation keep you from work, meetings or getting things that you  "need?: 1   Physical Activity: Not on file   Stress: Not on file   Social Connections: Unknown (1/1/2022)    Received from [facility]    Social Connections      Frequency of Communication with Friends and Family: Not on file   Interpersonal Safety: Not on file   Housing Stability: Low Risk  (2/14/2024)    Received from [facility]    Housing Stability      What is your housing situation today?: 1       Family History   Problem Relation Age of Onset     Sjogren's Mother      Sjogren's Sister            Vitals: /71 (BP Location: Right arm, Patient Position: Sitting, Cuff Size: Adult Regular)   Pulse 83   Ht 1.537 m (5' 0.5\")   Wt 53.5 kg (118 lb)   SpO2 95%   BMI 22.67 kg/m      Exam:   GENERAL APPEARANCE: Well developed, well nourished, alert, and in no apparent distress.  RESP: Good air flow throughout.  + Bibasilar inspiratory crackles. No rhonchi. No wheezes.  No inspiratory squeaks.  CV: Normal S1, S2, regular rhythm, normal rate. No murmur.  No LE edema.   MS: +Bony changes in hand joints bilaterally. No clubbing. No cyanosis.  SKIN: No rash on limited exam.  NEURO: Mentation intact, speech normal.  PSYCH: Mentation appears normal. Normal/bright affect.      Results:  Recent Results (from the past week)   General PFT Lab (Please always keep checked)    Collection Time: 07/03/25  9:28 AM   Result Value Ref Range    FVC-Pred 2.23 L    FVC-Pre 1.70 L    FVC-%Pred-Pre 76 %    FEV1-Pre 1.48 L    FEV1-%Pred-Pre 83 %    FEV1FVC-Pred 79 %    FEV1FVC-Pre 87 %    FEFMax-Pred 4.90 L/sec    FEFMax-Pre 6.46 L/sec    FEFMax-%Pred-Pre 131 %    FEF2575-Pred 1.55 L/sec    FEF2575-Pre 2.17 L/sec    UGW2779-%Pred-Pre 139 %    ExpTime-Pre 3.84 sec    FIFMax-Pre 4.98 L/sec    VC-Pred 2.69 L    VC-Pre 1.75 L    VC-%Pred-Pre 65 %    IC-Pred 1.63 L    IC-Pre 1.42 L    IC-%Pred-Pre 87 %    ERV-Pred 0.82 L    ERV-Pre 0.33 L    ERV-%Pred-Pre 40 %    FEV1FEV6-Pred " 79 %    FEV1FEV6-Pre 87 %    FRCPleth-Pred 2.58 L    FRCPleth-Pre 1.55 L    FRCPleth-%Pred-Pre 60 %    RVPleth-Pred 1.95 L    RVPleth-Pre 1.22 L    RVPleth-%Pred-Pre 62 %    TLCPleth-Pred 4.35 L    TLCPleth-Pre 2.97 L    TLCPleth-%Pred-Pre 68 %    DLCOunc-Pred 17.15 ml/min/mmHg    DLCOunc-Pre 10.46 ml/min/mmHg    DLCOunc-%Pred-Pre 61 %    VA-Pre 2.60 L    VA-%Pred-Pre 65 %    FEV1SVC-Pred 65 %    FEV1SVC-Pre 84 %       I reviewed pulmonary function test that was performed today.  This shows mild restriction with a moderate diffusion defect.  Lung function remains stable although down from her baseline in 2022..    She had an outside chest CT in November 2024, but we do not have the images.  The outside report states that the ILD looks similar compared to August 2023.    The leflunomide metabolite level in October 2024 was not elevated.    I reviewed results with the patient.          Assessment and plan:  Saira Pyle is a 74 year old with ILD and bronchiolitis associated with lupus who is here for follow-up.  1.  ILD associated with lupus.  Although the PFT is stable, it is down from her prior baseline in 2023.  Symptomatically, her dyspnea on exertion appears stable.  She had an outside chest CT scan last fall, and we will get the images so that we can review with Dr. Crespo.  She is currently on leflunomide and belimumab for her lupus.  Whether these are treating her ILD is unclear.  Reviewing her chest CT will give me a better idea of whether she needs to start an antifibrotic.  Leflunomide metabolite level was not elevated when I checked it last fall.  We will contact her after we review outside chest CT scan.  She will return in 4 months with full PFT.  2.  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  Dyspnea on exertion remains stable.  She will continue montelukast 10 mg daily and Breo Ellipta inhaler.  Her prednisone dose remains at 3 mg daily, and this is managed by Dr. Dewey in  rheumatology.  I would not taper her prednisone dose any further at this time.    The longitudinal plan of care for the diagnosis(es)/condition(s) as documented were addressed during this visit. Due to the added complexity in care, I will continue to support Saira in the subsequent management and with ongoing continuity of care.  I spent 47 minutes reviewing chart, reviewing test results, talking with and examining patient, formulating plan, and documentation on the day of the encounter.                    Again, thank you for allowing me to participate in the care of your patient.        Sincerely,        Petra Valentin MD    Electronically signed

## 2025-07-03 NOTE — PATIENT INSTRUCTIONS
1. We will get the chest CT scans from [facility] for our radiologist to review  2. Continue your medications

## 2025-07-07 ENCOUNTER — TELEPHONE (OUTPATIENT)
Dept: PULMONOLOGY | Facility: CLINIC | Age: 75
End: 2025-07-07
Payer: COMMERCIAL

## 2025-07-07 NOTE — TELEPHONE ENCOUNTER
Patient Contacted for the patient to call back and schedule the following:    Appointment type: JENNIFER  Provider: ROBIN  Return date: Nov 2025  Specialty phone number: [phone]  Additional appointment(s) needed: full pft  Additonal Notes: na

## 2025-07-14 ENCOUNTER — TRANSFERRED RECORDS (OUTPATIENT)
Dept: HEALTH INFORMATION MANAGEMENT | Facility: CLINIC | Age: 75
End: 2025-07-14
Payer: COMMERCIAL

## 2025-07-28 DIAGNOSIS — J21.9 BRONCHIOLITIS: ICD-10-CM

## 2025-07-29 RX ORDER — MONTELUKAST SODIUM 10 MG/1
1 TABLET ORAL
Qty: 90 TABLET | Refills: 0 | Status: SHIPPED | OUTPATIENT
Start: 2025-07-29

## 2025-08-26 DIAGNOSIS — J84.9 ILD (INTERSTITIAL LUNG DISEASE) (H): ICD-10-CM

## 2025-08-26 DIAGNOSIS — J21.9 BRONCHIOLITIS: ICD-10-CM

## 2025-08-26 DIAGNOSIS — J98.4 SMALL AIRWAYS DISEASE: ICD-10-CM

## 2025-08-26 RX ORDER — FLUTICASONE FUROATE AND VILANTEROL TRIFENATATE 100; 25 UG/1; UG/1
POWDER RESPIRATORY (INHALATION)
Qty: 60 EACH | Refills: 0 | Status: SHIPPED | OUTPATIENT
Start: 2025-08-26

## 2025-09-02 ENCOUNTER — TRANSFERRED RECORDS (OUTPATIENT)
Dept: HEALTH INFORMATION MANAGEMENT | Facility: CLINIC | Age: 75
End: 2025-09-02
Payer: COMMERCIAL

## (undated) DEVICE — GLOVE PROTEXIS W/NEU-THERA 6.5  2D73TE65

## (undated) DEVICE — BONE CEMENT MIXEVAC III HI VAC KIT  0206-015-000

## (undated) DEVICE — GLOVE PROTEXIS BLUE W/NEU-THERA 6.5  2D73EB65

## (undated) DEVICE — DRSG ADAPTIC 3X8" 6113

## (undated) DEVICE — WRAP EZY KNEE

## (undated) DEVICE — ESU PENCIL W/SMOKE EVAC NEPTUNE STRYKER 0703-046-000

## (undated) DEVICE — BLADE SAW RECIP STRK 70X12.5X1.2MM 0277-096-281

## (undated) DEVICE — BONE CLEANING TIP INTERPULSE  0210-010-000

## (undated) DEVICE — MANIFOLD NEPTUNE 4 PORT 700-20

## (undated) DEVICE — SUCTION IRR SYSTEM W/O TIP INTERPULSE HANDPIECE 0210-100-000

## (undated) DEVICE — SYR 30ML SLIP TIP W/O NDL 302833

## (undated) DEVICE — BLADE SAW SAGITTAL STRK 19.5X95X1.27MM 2108-109-000S15

## (undated) DEVICE — ESU GROUND PAD UNIVERSAL W/O CORD

## (undated) DEVICE — SU VICRYL 2-0 CT-1 27" UND J259H

## (undated) DEVICE — TOURNIQUET CUFF 30" STERILE

## (undated) DEVICE — GLOVE PROTEXIS POWDER FREE 6.5 ORTHOPEDIC 2D73ET65

## (undated) DEVICE — BNDG ELASTIC 4"X5YDS UNSTERILE 6611-40

## (undated) DEVICE — NDL SPINAL 18GA 3.5" 405184

## (undated) DEVICE — KIT SURGICAL TURNOVER FVSD-01D

## (undated) DEVICE — SU ETHIBOND 0 CTX CR  8X18" CX31D

## (undated) DEVICE — IMM KNEE 20" 0814-2660

## (undated) DEVICE — PREP CHLORAPREP 26ML TINTED ORANGE  260815

## (undated) DEVICE — LINEN TOWEL PACK X5 5464

## (undated) DEVICE — SOL WATER IRRIG 1000ML BOTTLE 2F7114

## (undated) DEVICE — CAST PADDING 6" UNSTERILE 9046

## (undated) DEVICE — PACK TOTAL KNEE SOP15TKFSD

## (undated) DEVICE — SYR 50ML LL W/O NDL 309653

## (undated) RX ORDER — ONDANSETRON 2 MG/ML
INJECTION INTRAMUSCULAR; INTRAVENOUS
Status: DISPENSED
Start: 2018-10-02

## (undated) RX ORDER — LIDOCAINE HYDROCHLORIDE 20 MG/ML
INJECTION, SOLUTION EPIDURAL; INFILTRATION; INTRACAUDAL; PERINEURAL
Status: DISPENSED
Start: 2019-10-01

## (undated) RX ORDER — DEXAMETHASONE SODIUM PHOSPHATE 4 MG/ML
INJECTION, SOLUTION INTRA-ARTICULAR; INTRALESIONAL; INTRAMUSCULAR; INTRAVENOUS; SOFT TISSUE
Status: DISPENSED
Start: 2018-10-02

## (undated) RX ORDER — ONDANSETRON 2 MG/ML
INJECTION INTRAMUSCULAR; INTRAVENOUS
Status: DISPENSED
Start: 2019-10-01

## (undated) RX ORDER — PROPOFOL 10 MG/ML
INJECTION, EMULSION INTRAVENOUS
Status: DISPENSED
Start: 2019-10-01

## (undated) RX ORDER — METHYLPREDNISOLONE SODIUM SUCCINATE 125 MG/2ML
INJECTION, POWDER, LYOPHILIZED, FOR SOLUTION INTRAMUSCULAR; INTRAVENOUS
Status: DISPENSED
Start: 2019-10-01

## (undated) RX ORDER — FENTANYL CITRATE 50 UG/ML
INJECTION, SOLUTION INTRAMUSCULAR; INTRAVENOUS
Status: DISPENSED
Start: 2018-10-02

## (undated) RX ORDER — LIDOCAINE HYDROCHLORIDE 10 MG/ML
INJECTION, SOLUTION EPIDURAL; INFILTRATION; INTRACAUDAL; PERINEURAL
Status: DISPENSED
Start: 2019-10-01

## (undated) RX ORDER — GLYCOPYRROLATE 0.2 MG/ML
INJECTION, SOLUTION INTRAMUSCULAR; INTRAVENOUS
Status: DISPENSED
Start: 2018-10-02

## (undated) RX ORDER — LIDOCAINE HYDROCHLORIDE 20 MG/ML
INJECTION, SOLUTION EPIDURAL; INFILTRATION; INTRACAUDAL; PERINEURAL
Status: DISPENSED
Start: 2018-10-02

## (undated) RX ORDER — NEOSTIGMINE METHYLSULFATE 1 MG/ML
VIAL (ML) INJECTION
Status: DISPENSED
Start: 2018-10-02

## (undated) RX ORDER — METHYLPREDNISOLONE SODIUM SUCCINATE 125 MG/2ML
INJECTION, POWDER, LYOPHILIZED, FOR SOLUTION INTRAMUSCULAR; INTRAVENOUS
Status: DISPENSED
Start: 2018-10-02

## (undated) RX ORDER — FENTANYL CITRATE 50 UG/ML
INJECTION, SOLUTION INTRAMUSCULAR; INTRAVENOUS
Status: DISPENSED
Start: 2019-10-01

## (undated) RX ORDER — DEXAMETHASONE SODIUM PHOSPHATE 4 MG/ML
INJECTION, SOLUTION INTRA-ARTICULAR; INTRALESIONAL; INTRAMUSCULAR; INTRAVENOUS; SOFT TISSUE
Status: DISPENSED
Start: 2019-10-01

## (undated) RX ORDER — CLINDAMYCIN PHOSPHATE 900 MG/50ML
INJECTION, SOLUTION INTRAVENOUS
Status: DISPENSED
Start: 2019-10-01

## (undated) RX ORDER — CLINDAMYCIN PHOSPHATE 900 MG/50ML
INJECTION, SOLUTION INTRAVENOUS
Status: DISPENSED
Start: 2018-10-02

## (undated) RX ORDER — PROPOFOL 10 MG/ML
INJECTION, EMULSION INTRAVENOUS
Status: DISPENSED
Start: 2018-10-02